# Patient Record
Sex: FEMALE | Race: WHITE | Employment: OTHER | URBAN - METROPOLITAN AREA
[De-identification: names, ages, dates, MRNs, and addresses within clinical notes are randomized per-mention and may not be internally consistent; named-entity substitution may affect disease eponyms.]

---

## 2017-01-12 RX ORDER — PREDNISONE 5 MG/1
TABLET ORAL
Qty: 135 TAB | Refills: 1 | Status: SHIPPED | OUTPATIENT
Start: 2017-01-12 | End: 2017-09-28 | Stop reason: DRUGHIGH

## 2017-01-25 LAB
ALP SERPL-CCNC: 140 IU/L (ref 39–117)
CRP SERPL-MCNC: 1.6 MG/L (ref 0–4.9)
ERYTHROCYTE [SEDIMENTATION RATE] IN BLOOD BY WESTERGREN METHOD: 4 MM/HR (ref 0–40)
PLEASE NOTE:, 188601: NORMAL

## 2017-02-20 ENCOUNTER — OFFICE VISIT (OUTPATIENT)
Dept: RHEUMATOLOGY | Age: 72
End: 2017-02-20

## 2017-02-20 VITALS
BODY MASS INDEX: 40.34 KG/M2 | RESPIRATION RATE: 16 BRPM | TEMPERATURE: 98.3 F | OXYGEN SATURATION: 97 % | HEIGHT: 62 IN | SYSTOLIC BLOOD PRESSURE: 110 MMHG | DIASTOLIC BLOOD PRESSURE: 70 MMHG | WEIGHT: 219.2 LBS | HEART RATE: 74 BPM

## 2017-02-20 DIAGNOSIS — K76.0 FATTY LIVER: ICD-10-CM

## 2017-02-20 DIAGNOSIS — Z79.52 LONG TERM (CURRENT) USE OF SYSTEMIC STEROIDS: ICD-10-CM

## 2017-02-20 DIAGNOSIS — M85.80 OSTEOPENIA: ICD-10-CM

## 2017-02-20 DIAGNOSIS — R74.8 ELEVATED ALKALINE PHOSPHATASE LEVEL: ICD-10-CM

## 2017-02-20 DIAGNOSIS — Z79.899 LONG-TERM USE OF HIGH-RISK MEDICATION: ICD-10-CM

## 2017-02-20 DIAGNOSIS — M06.4 INFLAMMATORY POLYARTHRITIS (HCC): Primary | ICD-10-CM

## 2017-02-20 DIAGNOSIS — M54.50 CHRONIC BILATERAL LOW BACK PAIN WITHOUT SCIATICA: ICD-10-CM

## 2017-02-20 DIAGNOSIS — G89.29 CHRONIC BILATERAL LOW BACK PAIN WITHOUT SCIATICA: ICD-10-CM

## 2017-02-20 NOTE — PATIENT INSTRUCTIONS
Gentle PT    Prednisone lower by 1 mg every 2-4 weeks if tolerated.  If significant pain persists with taper, please notify me (and return to lowest effective dose)

## 2017-02-20 NOTE — MR AVS SNAPSHOT
Visit Information Date & Time Provider Department Dept. Phone Encounter #  
 2/20/2017 10:30 AM Mariann Addison MD [facility] [id number] Follow-up Instructions Return in about 3 months (around 5/20/2017). Upcoming Health Maintenance Date Due DTaP/Tdap/Td series (1 - Tdap) 8/4/1968 BREAST CANCER SCRN MAMMOGRAM 8/4/1997 FOBT Q 1 YEAR AGE 50-75 8/4/1997 Pneumococcal 65+ Low/Medium Risk (1 of 2 - PCV13) 8/4/2012 MEDICARE YEARLY EXAM 8/4/2012 GLAUCOMA SCREENING Q2Y 6/14/2018 Allergies as of 2/20/2017  Review Complete On: 2/20/2017 By: Mariann Addison MD  
  
 Severity Noted Reaction Type Reactions Cottonseed  11/25/2015    Cough Lamisil [Terbinafine]  10/16/2012    Rash Malt Extract  11/25/2015    Cough, Other (comments) Asthma Current Immunizations  Reviewed on 2/20/2017 Name Date Influenza Vaccine 10/31/2016, 10/25/2015 Zoster Vaccine, Live 11/25/2013 Reviewed by Urban Orourke LPN on 9/03/7322 at 44:53 AM  
You Were Diagnosed With   
  
 Codes Comments Inflammatory polyarthritis ([facility] [address]    -  Primary ICD-10-CM: T59.8 ICD-9-CM: 714.9 Osteopenia     ICD-10-CM: M85.80 ICD-9-CM: 733.90 Long-term use of high-risk medication     ICD-10-CM: Z79.899 ICD-9-CM: V58.69 Long term (current) use of systemic steroids     ICD-10-CM: Z79.52 
ICD-9-CM: V58.65 Fatty liver     ICD-10-CM: K76.0 ICD-9-CM: 571.8 Chronic bilateral low back pain without sciatica     ICD-10-CM: M54.5, G89.29 ICD-9-CM: 724.2, 338.29 Elevated alkaline phosphatase level     ICD-10-CM: R74.8 ICD-9-CM: 790.5 Vitals BP Pulse Temp Resp Height(growth percentile) Weight(growth percentile) 110/70 (BP 1 Location: Right arm, BP Patient Position: Sitting) 74 98.3 °F (36.8 °C) (Oral) 16 5' 2\" (1.575 m) 219 lb 3.2 oz (99.4 kg) LMP SpO2 BMI OB Status Smoking Status 11/25/2002 97% 40.09 kg/m2 Postmenopausal Never Smoker Vitals History BMI and BSA Data Body Mass Index Body Surface Area 40.09 kg/m 2 2.09 m 2 [facility] [address] [phone] Your Updated Medication List  
  
   
This list is accurate as of: 2/20/17 11:12 AM.  Always use your most recent med list.  
  
  
  
  
 alendronate 70 mg tablet Commonly known as:  FOSAMAX 70 mg once weekly. Please review proper mode of taking  
  
 calcium-cholecalciferol (D3) tablet Commonly known as:  CALTRATE 600+D Take 1 Tab by mouth two (2) times a day. cholecalciferol 1,000 unit Cap Commonly known as:  VITAMIN D3 Take 1,000 Units by mouth two (2) times a day. DIOVAN -25 mg per tablet Generic drug:  valsartan-hydroCHLOROthiazide Take 1 Tab by mouth daily. DULERA 200-5 mcg/actuation HFA inhaler Generic drug:  mometasone-formoterol Take 2 Puffs by inhalation two (2) times a day. GLUCOSAMINE 1500 COMPLEX 500-400 mg capsule Generic drug:  glucosamine-chondroit-vit c-mn Take 2 Caps by mouth nightly. hydroxychloroquine 200 mg tablet Commonly known as:  PLAQUENIL  
TAKE 1 TABLET TWICE A DAY  
  
 ketoconazole 2 % topical cream  
Commonly known as:  NIZORAL Apply  to affected area as needed. magnesium oxide 400 mg tablet Commonly known as:  MAG-OX Take 400 mg by mouth daily. naproxen sodium 220 mg tablet Commonly known as:  NAPROSYN Take 440 mg by mouth daily as needed. NexIUM 40 mg capsule Generic drug:  esomeprazole Take  by mouth daily. olopatadine 0.6 % Spry Commonly known as:  PATANASE  
1 Squirt by Both Nostrils route two (2) times a day. * predniSONE 1 mg tablet Commonly known as:  Radha Zapata Along with 5 mg tab, take two 1 mg tabs for total 7 mg daily. Lower by 1 mg every 2-4 weeks if tolerated * predniSONE 5 mg tablet Commonly known as:  Jauna Lora Take one 5 mg tab once daily with two 1 mg tabs (7 mg daily total dose). Taper by 1 mg every 2 weeks if tolerated SINGULAIR 10 mg tablet Generic drug:  montelukast  
Take 10 mg by mouth daily. ZyrTEC 10 mg tablet Generic drug:  cetirizine Take  by mouth daily. * Notice: This list has 2 medication(s) that are the same as other medications prescribed for you. Read the directions carefully, and ask your doctor or other care provider to review them with you. We Performed the Following REFERRAL TO PHYSICAL THERAPY [BCY12 Custom] Comments:  
 Gentle topical analgesia over affected area(s). Gentle passive range of motion maneuvers as tolerated. Advance to active range of motion maneuvers as tolerated. Provide patient with a home education program.  
  
Follow-up Instructions Return in about 3 months (around 5/20/2017). To-Do List   
 03/27/2017 Lab:  C REACTIVE PROTEIN, QT   
  
 03/27/2017 Lab:  CBC WITH AUTOMATED DIFF   
  
 03/27/2017 Lab:  METABOLIC PANEL, COMPREHENSIVE   
  
 03/27/2017 Lab:  SED RATE (ESR) Referral Information Referral ID Referred By Referred To  
  
 [id number] Amanda Munroe Not Available Visits Status Start Date End Date 12 New Request 2/20/17 2/20/18 If your referral has a status of pending review or denied, additional information will be sent to support the outcome of this decision. Patient Instructions Gentle PT Prednisone lower by 1 mg every 2-4 weeks if tolerated. If significant pain persists with taper, please notify me (and return to lowest effective dose) Introducing [facility]! Dear Esperanza Chaudhry: Thank you for requesting a Firethorn account. Our records indicate that you already have an active Firethorn account. You can access your account anytime at [email or web address]/Therapeutic Systemst Did you know that you can access your hospital and ER discharge instructions at any time in 1DayMakeover? You can also review all of your test results from your hospital stay or ER visit. Additional Information If you have questions, please visit the Frequently Asked Questions section of the 1DayMakeover website at [email or web address] Remember, 1DayMakeover is NOT to be used for urgent needs. For medical emergencies, dial 911. Now available from your iPhone and Android! Please provide this summary of care documentation to your next provider. Your primary care clinician is listed as Natasha Wolf. If you have any questions after today's visit, please call [phone].

## 2017-02-20 NOTE — PROGRESS NOTES
HISTORY OF PRESENT ILLNESS  Alexus Pena is a 73 y.o. female. HPI Patient presents for follow up of arthritis. With current prednisone at 6 mg daily, she is doing reasonably well (lowered from 7 mg daily a couple of weeks ago). She has had some pain in the right shoulder, hands, low back, and the knees. Back pain is localized and increases with prolonged walking. Mild myalgias are noted (improved with prednisone). She has AM stiffness of 5 minutes. She has no joint swelling. Mild, sporadic, frontal headaches are noted. She is not taking naproxen. She has been taking Plaquenil 200 mg twice daily. She is taking calcium 600 mg twice daily and vitamin D 400 units twice daily. She takes an additional vitamin D 1000 units daily. She is taking alendronate 70 mg weekly as directed and is tolerating this. She has been trying to walk more regularly for exercise (15-20 minutes). Current Outpatient Prescriptions   Medication Sig Dispense Refill    predniSONE (DELTASONE) 5 mg tablet Take one 5 mg tab once daily with two 1 mg tabs (7 mg daily total dose). Taper by 1 mg every 2 weeks if tolerated 135 Tab 1    calcium-cholecalciferol, D3, (CALTRATE 600+D) tablet Take 1 Tab by mouth two (2) times a day.  predniSONE (DELTASONE) 1 mg tablet Along with 5 mg tab, take two 1 mg tabs for total 7 mg daily. Lower by 1 mg every 2-4 weeks if tolerated (Patient taking differently: 6 mg. Along with 5 mg tab, take two 1 mg tabs for total 7 mg daily. Lower by 1 mg every 2-4 weeks if tolerated) 120 Tab 5    alendronate (FOSAMAX) 70 mg tablet 70 mg once weekly. Please review proper mode of taking 4 Tab 11    hydroxychloroquine (PLAQUENIL) 200 mg tablet TAKE 1 TABLET TWICE A  Tab 1    magnesium oxide (MAG-OX) 400 mg tablet Take 400 mg by mouth daily.  Cholecalciferol, Vitamin D3, 1,000 unit cap Take 1,000 Units by mouth two (2) times a day.       DULERA 200-5 mcg/actuation HFA inhaler Take 2 Puffs by inhalation two (2) times a day.      olopatadine (PATANASE) 0.6 % spry 1 Squirt by Both Nostrils route two (2) times a day.  glucosamine-chondroit-vit c-mn (GLUCOSAMINE 1500 COMPLEX) 500-400 mg capsule Take 2 Caps by mouth nightly.  montelukast (SINGULAIR) 10 mg tablet Take 10 mg by mouth daily.  esomeprazole (NEXIUM) 40 mg capsule Take  by mouth daily.  valsartan-hydrochlorothiazide (DIOVAN HCT) 160-25 mg per tablet Take 1 Tab by mouth daily.  cetirizine (ZYRTEC) 10 mg tablet Take  by mouth daily.  naproxen sodium (NAPROSYN) 220 mg tablet Take 440 mg by mouth daily as needed.  ketoconazole (NIZORAL) 2 % topical cream Apply  to affected area as needed. Allergies   Allergen Reactions    Cottonseed Cough    Lamisil [Terbinafine] Rash    Malt Extract Cough and Other (comments)     Asthma         Review of Systems   Constitutional: Negative for fever. Eyes: Negative for blurred vision. Cardiovascular: Negative for leg swelling. Gastrointestinal: Negative for abdominal pain. Skin: Negative for rash. Physical Exam   Vitals reviewed. Constitutional: She is oriented to person, place, and time. She appears well-developed and well-nourished. No distress.    HENT:    Mouth/Throat: Oropharynx is clear and dry. No oropharyngeal exudate. No areas exposed jaw bone   Neck: Neck supple. FROM  Cardiovascular:   RR;   No edema  Pulmonary/Chest: Effort normal and breath sounds normal. No respiratory distress.    Abdominal: Soft. She exhibits no distension. There is no tenderness. No organomegaly   Musculoskeletal:   -flexion each knee to 90 degrees. Right shoulder painful FROM    -tenderness right shoulder each knee  -no lisa peripheral synovitis  -LS flexion 90 degrees. Tenderness L>R L4-S1 paraspinals  Lymphadenopathy:     She has no cervical adenopathy. Neurological: She is alert and oriented to person, place, and time. She exhibits normal muscle tone. Skin: Skin is warm and dry. -no acute rash; scattered bruising/ excoriations over right forearm  Psychiatric: She has a normal mood and affect. Judgment normal.    Lab Results   Component Value Date/Time    Sed rate (ESR) 4 01/24/2017 12:00 AM     CRP 0.16 mg/dL  Lab Results   Component Value Date/Time    Sodium 135 11/14/2016 11:19 AM    Potassium 3.6 11/14/2016 11:19 AM    Chloride 92 11/14/2016 11:19 AM    CO2 26 11/14/2016 11:19 AM    Glucose 85 11/14/2016 11:19 AM    BUN 14 11/14/2016 11:19 AM    Creatinine 0.81 11/14/2016 11:19 AM    BUN/Creatinine ratio 17 11/14/2016 11:19 AM    GFR est AA 86 11/14/2016 11:19 AM    GFR est non-AA 74 11/14/2016 11:19 AM    Calcium 9.8 11/14/2016 11:19 AM    Bilirubin, total 0.4 02/23/2016 02:30 AM    AST (SGOT) 18 02/23/2016 02:30 AM    Alk. phosphatase 140 01/24/2017 12:00 AM    Protein, total 6.3 02/23/2016 02:30 AM    Albumin 4.0 02/23/2016 02:30 AM    A-G Ratio 1.7 02/23/2016 02:30 AM    ALT (SGPT) 22 02/23/2016 02:30 AM       ASSESSMENT and PLAN    ICD-10-CM ICD-9-CM    1. Inflammatory polyarthritis ([facility] [address]): RF, CCP, NEMESIO negative. RNP likely false positive. 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 OA of knees as well. Hand radiographs with significant JSN at each 3rd MCP. She has been taking Plaquenil 200 mg BID. More significant proximal myalgias recently raised consideration of PMR (with initial ESR/CRP normal), though still most likely seronegative RA. With prednisone 6 mg daily, symptoms remain controlled. M06.4 714.9 SED RATE (ESR)  Plaquenil 200 mg BID  Prednisone 6 mg daily with taper by 1 mg every 2-4 weeks if tolerated  -Consider methotrexate (see below) if unable to significantly taper prednisone  -comfortable, low impact exercise encouraged      C REACTIVE PROTEIN, QT   2. Osteopenia: T-score -2.0 femoral neck 2014. No clinical fractures personally or first degree relative with hip fracture.  DXA recently with low femoral neck (not currently on chart yet).  She is taking alendronate 70 mg weekly. M85.80 733.90 -calcium 1200 mg daily total  -vitamin D3 2042-1210 units daily  -alendronate 70 mg weekly   3. Long-term use of high-risk medication F37.956 B85.25 METABOLIC PANEL, COMPREHENSIVE      CBC WITH AUTOMATED DIFF  Plaquenil eye monitoring   4. Long term (current) use of systemic steroids Z79.52 V58.65 See above   5. Fatty liver: noted on recent chest CT K76.0 571.8 -weight loss encouraged  -if methotrexate utilized, cautious monitoring will be needed   6. Chronic bilateral low back pain without sciatica M54.5 724.2 REFERRAL TO PHYSICAL THERAPY    G89.29 338.29    7. Elevated alkaline phosphatase level: mild and repeatedly elevated on available records ([phone]). Most recent D, PTH, GGT normal. Unclear etiology. Recent check 140 (on prednisone)- perhaps related to inflammatory arthritis.  R19.7 816.7 METABOLIC PANEL, COMPREHENSIVE

## 2017-05-05 LAB
ALBUMIN SERPL-MCNC: 4.1 G/DL (ref 3.6–4.8)
ALBUMIN/GLOB SERPL: 1.9 {RATIO} (ref 1.2–2.2)
ALP SERPL-CCNC: 139 IU/L (ref 39–117)
ALT SERPL-CCNC: 31 IU/L (ref 0–32)
AST SERPL-CCNC: 20 IU/L (ref 0–40)
BASOPHILS # BLD AUTO: 0 X10E3/UL (ref 0–0.2)
BASOPHILS NFR BLD AUTO: 0 %
BILIRUB SERPL-MCNC: 0.3 MG/DL (ref 0–1.2)
BUN SERPL-MCNC: 14 MG/DL (ref 8–27)
BUN/CREAT SERPL: 15 (ref 12–28)
CALCIUM SERPL-MCNC: 9.3 MG/DL (ref 8.7–10.3)
CHLORIDE SERPL-SCNC: 92 MMOL/L (ref 96–106)
CO2 SERPL-SCNC: 25 MMOL/L (ref 18–29)
CREAT SERPL-MCNC: 0.94 MG/DL (ref 0.57–1)
CRP SERPL-MCNC: 4 MG/L (ref 0–4.9)
EOSINOPHIL # BLD AUTO: 0.1 X10E3/UL (ref 0–0.4)
EOSINOPHIL NFR BLD AUTO: 2 %
ERYTHROCYTE [DISTWIDTH] IN BLOOD BY AUTOMATED COUNT: 14.2 % (ref 12.3–15.4)
ERYTHROCYTE [SEDIMENTATION RATE] IN BLOOD BY WESTERGREN METHOD: 11 MM/HR (ref 0–40)
GLOBULIN SER CALC-MCNC: 2.2 G/DL (ref 1.5–4.5)
GLUCOSE SERPL-MCNC: 153 MG/DL (ref 65–99)
HCT VFR BLD AUTO: 36.7 % (ref 34–46.6)
HGB BLD-MCNC: 12 G/DL (ref 11.1–15.9)
IMM GRANULOCYTES # BLD: 0 X10E3/UL (ref 0–0.1)
IMM GRANULOCYTES NFR BLD: 0 %
LYMPHOCYTES # BLD AUTO: 0.7 X10E3/UL (ref 0.7–3.1)
LYMPHOCYTES NFR BLD AUTO: 14 %
MCH RBC QN AUTO: 27.9 PG (ref 26.6–33)
MCHC RBC AUTO-ENTMCNC: 32.7 G/DL (ref 31.5–35.7)
MCV RBC AUTO: 85 FL (ref 79–97)
MONOCYTES # BLD AUTO: 0.4 X10E3/UL (ref 0.1–0.9)
MONOCYTES NFR BLD AUTO: 8 %
NEUTROPHILS # BLD AUTO: 4.1 X10E3/UL (ref 1.4–7)
NEUTROPHILS NFR BLD AUTO: 76 %
PLATELET # BLD AUTO: 289 X10E3/UL (ref 150–379)
POTASSIUM SERPL-SCNC: 4.1 MMOL/L (ref 3.5–5.2)
PROT SERPL-MCNC: 6.3 G/DL (ref 6–8.5)
RBC # BLD AUTO: 4.3 X10E6/UL (ref 3.77–5.28)
SODIUM SERPL-SCNC: 134 MMOL/L (ref 134–144)
WBC # BLD AUTO: 5.3 X10E3/UL (ref 3.4–10.8)

## 2017-05-05 NOTE — PROGRESS NOTES
Please forward to PCP. Glucose 153.  Elevated liver/bone test is only slightly elevated (better than at baseline)

## 2017-05-09 NOTE — PROGRESS NOTES
Called and spoke with the patient. Informed them of the results/orders. Understanding was verbalized. Labs forwarded to to pcp.

## 2017-05-18 ENCOUNTER — OFFICE VISIT (OUTPATIENT)
Dept: RHEUMATOLOGY | Age: 72
End: 2017-05-18

## 2017-05-18 VITALS
BODY MASS INDEX: 39.75 KG/M2 | HEIGHT: 62 IN | RESPIRATION RATE: 16 BRPM | OXYGEN SATURATION: 100 % | TEMPERATURE: 97.1 F | HEART RATE: 81 BPM | SYSTOLIC BLOOD PRESSURE: 126 MMHG | WEIGHT: 216 LBS | DIASTOLIC BLOOD PRESSURE: 69 MMHG

## 2017-05-18 DIAGNOSIS — K76.0 FATTY LIVER: ICD-10-CM

## 2017-05-18 DIAGNOSIS — M54.50 CHRONIC LEFT-SIDED LOW BACK PAIN WITHOUT SCIATICA: ICD-10-CM

## 2017-05-18 DIAGNOSIS — M06.4 INFLAMMATORY POLYARTHRITIS (HCC): Primary | ICD-10-CM

## 2017-05-18 DIAGNOSIS — M94.9 DISORDER OF BONE AND CARTILAGE, UNSPECIFIED: ICD-10-CM

## 2017-05-18 DIAGNOSIS — G89.29 CHRONIC LEFT-SIDED LOW BACK PAIN WITHOUT SCIATICA: ICD-10-CM

## 2017-05-18 DIAGNOSIS — M89.9 DISORDER OF BONE AND CARTILAGE, UNSPECIFIED: ICD-10-CM

## 2017-05-18 DIAGNOSIS — Z79.899 LONG-TERM USE OF HIGH-RISK MEDICATION: ICD-10-CM

## 2017-05-18 RX ORDER — AMOXICILLIN 875 MG/1
TABLET, FILM COATED ORAL
COMMUNITY
Start: 2017-05-17 | End: 2019-01-21

## 2017-05-18 RX ORDER — ALBUTEROL SULFATE 0.83 MG/ML
SOLUTION RESPIRATORY (INHALATION)
COMMUNITY
Start: 2017-05-17 | End: 2019-04-23

## 2017-05-18 RX ORDER — IPRATROPIUM BROMIDE 0.5 MG/2.5ML
SOLUTION RESPIRATORY (INHALATION)
COMMUNITY
Start: 2017-05-17 | End: 2019-01-21

## 2017-05-18 NOTE — PATIENT INSTRUCTIONS
In a few weeks when bronchitis is better, then lower the prednisone by taking 3 mg one day and 2 mg the next, alternating every other day like this for several weeks. Then lower to 2 mg daily for several weeks. If unable to continue to lower the prednisone, then we'll start methotrexate. Call/email us with an update.

## 2017-05-18 NOTE — MR AVS SNAPSHOT
Visit Information Date & Time Provider Department Dept. Phone Encounter #  
 5/18/2017 10:30 AM Geraldo Guzman MD [facility] [id number] Follow-up Instructions Return in about 3 months (around 8/18/2017). Upcoming Health Maintenance Date Due DTaP/Tdap/Td series (1 - Tdap) 8/4/1968 BREAST CANCER SCRN MAMMOGRAM 8/4/1997 FOBT Q 1 YEAR AGE 50-75 8/4/1997 Pneumococcal 65+ Low/Medium Risk (1 of 2 - PCV13) 8/4/2012 MEDICARE YEARLY EXAM 8/4/2012 INFLUENZA AGE 9 TO ADULT 8/1/2017 GLAUCOMA SCREENING Q2Y 6/14/2018 Allergies as of 5/18/2017  Review Complete On: 5/18/2017 By: Raisa Estrada PA-C Severity Noted Reaction Type Reactions Cottonseed  11/25/2015    Cough Lamisil [Terbinafine]  10/16/2012    Rash Malt Extract  11/25/2015    Cough, Other (comments) Asthma Current Immunizations  Reviewed on 2/20/2017 Name Date Influenza Vaccine 10/31/2016, 10/25/2015 Zoster Vaccine, Live 11/25/2013 Not reviewed this visit You Were Diagnosed With   
  
 Codes Comments [facility]    -  Primary ICD-10-CM: N41.5 ICD-9-CM: 714.9 Long-term use of high-risk medication     ICD-10-CM: Z79.899 ICD-9-CM: V58.69 Fatty liver     ICD-10-CM: K76.0 ICD-9-CM: 571.8 Chronic left-sided low back pain without sciatica     ICD-10-CM: M54.5, G89.29 ICD-9-CM: 724.2, 338.29 Osteopenia of left thigh     ICD-10-CM: F64.890 ICD-9-CM: 733.90 Vitals BP Pulse Temp Resp Height(growth percentile) Weight(growth percentile) 126/69 (BP 1 Location: Right arm, BP Patient Position: Sitting) 81 97.1 °F (36.2 °C) (Oral) 16 5' 2\" (1.575 m) 216 lb (98 kg) LMP SpO2 BMI OB Status Smoking Status 11/25/2002 100% 39.51 kg/m2 Postmenopausal Never Smoker BMI and BSA Data Body Mass Index Body Surface Area  
 39.51 kg/m 2 2.07 m 2 [address] [phone] Your Updated Medication List  
  
   
This list is accurate as of: 5/18/17 11:15 AM.  Always use your most recent med list.  
  
  
  
  
 albuterol 2.5 mg /3 mL (0.083 %) nebulizer solution Commonly known as:  PROVENTIL VENTOLIN  
  
 alendronate 70 mg tablet Commonly known as:  FOSAMAX 70 mg once weekly. Please review proper mode of taking  
  
 amoxicillin 875 mg tablet Commonly known as:  AMOXIL  
  
 calcium-cholecalciferol (D3) tablet Commonly known as:  CALTRATE 600+D Take 1 Tab by mouth two (2) times a day. cholecalciferol 1,000 unit Cap Commonly known as:  VITAMIN D3 Take 1,000 Units by mouth two (2) times a day. DIOVAN -25 mg per tablet Generic drug:  valsartan-hydroCHLOROthiazide Take 1 Tab by mouth daily. DULERA 200-5 mcg/actuation HFA inhaler Generic drug:  mometasone-formoterol Take 2 Puffs by inhalation two (2) times a day. GLUCOSAMINE 1500 COMPLEX 500-400 mg capsule Generic drug:  glucosamine-chondroit-vit c-mn Take 2 Caps by mouth nightly. hydroxychloroquine 200 mg tablet Commonly known as:  PLAQUENIL  
TAKE 1 TABLET TWICE A DAY  
  
 ipratropium 0.02 % nebulizer solution Commonly known as:  ATROVENT  
  
 ketoconazole 2 % topical cream  
Commonly known as:  NIZORAL Apply  to affected area as needed. magnesium oxide 400 mg tablet Commonly known as:  MAG-OX Take 400 mg by mouth daily. naproxen sodium 220 mg tablet Commonly known as:  NAPROSYN Take 440 mg by mouth daily as needed. NexIUM 40 mg capsule Generic drug:  esomeprazole Take  by mouth daily. olopatadine 0.6 % Spry Commonly known as:  PATANASE  
1 Squirt by Both Nostrils route two (2) times a day. * predniSONE 1 mg tablet Commonly known as:  Andrew Etienne Along with 5 mg tab, take two 1 mg tabs for total 7 mg daily.  Lower by 1 mg every 2-4 weeks if tolerated * predniSONE 5 mg tablet Commonly known as:  Tatyana Denis Take one 5 mg tab once daily with two 1 mg tabs (7 mg daily total dose). Taper by 1 mg every 2 weeks if tolerated SINGULAIR 10 mg tablet Generic drug:  montelukast  
Take 10 mg by mouth daily. ZyrTEC 10 mg tablet Generic drug:  cetirizine Take  by mouth daily. * Notice: This list has 2 medication(s) that are the same as other medications prescribed for you. Read the directions carefully, and ask your doctor or other care provider to review them with you. We Performed the Following REFERRAL TO PHYSICAL THERAPY [XCF50 Custom] Comments:  
 Once a week for 4 weeks to include HEP instruction. Follow-up Instructions Return in about 3 months (around 8/18/2017). Referral Information Referral ID Referred By Referred To  
  
 [id number] [facility] Not Available Visits Status Start Date End Date 1 New Request 5/18/17 5/18/18 If your referral has a status of pending review or denied, additional information will be sent to support the outcome of this decision. Patient Instructions In a few weeks when bronchitis is better, then lower the prednisone by taking 3 mg one day and 2 mg the next, alternating every other day like this for several weeks. Then lower to 2 mg daily for several weeks. If unable to continue to lower the prednisone, then we'll start methotrexate. Call/email us with an update. Introducing [facility]! Dear Edmund Hernnádez: Thank you for requesting a Baofeng account. Our records indicate that you already have an active Baofeng account. You can access your account anytime at [email or web address] Did you know that you can access your hospital and ER discharge instructions at any time in Baofeng? You can also review all of your test results from your hospital stay or ER visit. Additional Information If you have questions, please visit the Frequently Asked Questions section of the Huaathart website at [email or web address] Remember, [employer] is NOT to be used for urgent needs. For medical emergencies, dial 911. Now available from your iPhone and Android! Please provide this summary of care documentation to your next provider. Your primary care clinician is listed as Shane Barrios. If you have any questions after today's visit, please call [phone].

## 2017-05-18 NOTE — PROGRESS NOTES
HISTORY OF PRESENT ILLNESS  Kelsey Layton is a 73 y.o. female. HPI  She presents for follow up of inflammatory arthritis, on Plaquenil 200 mg bid, and prednisone 3 mg daily (lowered from 6 mg since last visit in Feb). \"I have better days than others. I've had very good success with the PT for my back. \" She started and completed PT after last visit for low back and hip pain, and she states it has been great benefit, she is still HEP daily. She reports \"steroid\" 40 mg IM yesterday by her PCP for her bronchitis (allergies this spring and cough x 2 days, no fevers), \"I feel great\". She has a bunion right foot, causes intermittent pain. Her hands flared when prednisone dose was 2.5 mg and improved when going back up to 3 mg last week. Some swelling in her hands persists on right, has improved on left. Myalgias persist (see below). Morning joint stiffness x 1-15 min. Not taking in NSAIDs. She is up to date on plaquenil eye exam. She is taking calcium 600 mg twice daily and vitamin D 400 units twice daily. She takes an additional vitamin D 1000 units daily. She is taking alendronate 70 mg weekly as directed and is tolerating this. 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 She reports doing well on her grandson's 3rd grade field trip to [place] in April. Chronically she notes pain only to touch of her \"large muscles\", to include upper back, upper arms, and thighs. She notes a numbness left lateral thigh with standing that resolves when she is sitting, this is chronic and unchanged by PT or steroids. Her large muscles pain did not change with the steroid injection yesterday. Review of Systems   Constitutional: Negative for fever and weight loss. HENT: Negative for sore throat. Eyes: Negative for blurred vision. Respiratory: Positive for cough and wheezing. Cardiovascular: Negative for leg swelling.    Gastrointestinal: Negative for abdominal pain, blood in stool, melena, nausea and vomiting. Musculoskeletal: Negative for falls. Skin: Negative for rash. Endo/Heme/Allergies: Negative for polydipsia. Does not bruise/bleed easily. Allergies   Allergen Reactions    Cottonseed Cough    Lamisil [Terbinafine] Rash    Malt Extract Cough and Other (comments)     Asthma         Current Outpatient Prescriptions   Medication Sig Dispense Refill    albuterol (PROVENTIL VENTOLIN) 2.5 mg /3 mL (0.083 %) nebulizer solution       amoxicillin (AMOXIL) 875 mg tablet       ipratropium (ATROVENT) 0.02 % nebulizer solution       calcium-cholecalciferol, D3, (CALTRATE 600+D) tablet Take 1 Tab by mouth two (2) times a day.  alendronate (FOSAMAX) 70 mg tablet 70 mg once weekly. Please review proper mode of taking 4 Tab 11    hydroxychloroquine (PLAQUENIL) 200 mg tablet TAKE 1 TABLET TWICE A  Tab 1    magnesium oxide (MAG-OX) 400 mg tablet Take 400 mg by mouth daily.  Cholecalciferol, Vitamin D3, 1,000 unit cap Take 1,000 Units by mouth two (2) times a day.  DULERA 200-5 mcg/actuation HFA inhaler Take 2 Puffs by inhalation two (2) times a day.  olopatadine (PATANASE) 0.6 % spry 1 Squirt by Both Nostrils route two (2) times a day.  glucosamine-chondroit-vit c-mn (GLUCOSAMINE 1500 COMPLEX) 500-400 mg capsule Take 2 Caps by mouth nightly.  montelukast (SINGULAIR) 10 mg tablet Take 10 mg by mouth daily.  esomeprazole (NEXIUM) 40 mg capsule Take  by mouth daily.  valsartan-hydrochlorothiazide (DIOVAN HCT) 160-25 mg per tablet Take 1 Tab by mouth daily.  cetirizine (ZYRTEC) 10 mg tablet Take  by mouth daily.  predniSONE (DELTASONE) 5 mg tablet Take one 5 mg tab once daily with two 1 mg tabs (7 mg daily total dose). Taper by 1 mg every 2 weeks if tolerated 135 Tab 1    predniSONE (DELTASONE) 1 mg tablet Along with 5 mg tab, take two 1 mg tabs for total 7 mg daily.  Lower by 1 mg every 2-4 weeks if tolerated (Patient taking differently: 3 mg. Along with 5 mg tab, take two 1 mg tabs for total 7 mg daily. Lower by 1 mg every 2-4 weeks if tolerated) 120 Tab 5    naproxen sodium (NAPROSYN) 220 mg tablet Take 440 mg by mouth daily as needed.  ketoconazole (NIZORAL) 2 % topical cream Apply  to affected area as needed. Past medical, surgical, and family hx reviewed. Vitals:    05/18/17 1025   BP: 126/69   Pulse: 81   Resp: 16   Temp: 97.1 °F (36.2 °C)   TempSrc: Oral   SpO2: 100%   Weight: 216 lb (98 kg)   Height: 5' 2\" (1.575 m)   PainSc:   0 - No pain   LMP: 11/25/2002       Physical Exam   Constitutional: She is oriented to person, place, and time. She appears well-developed and well-nourished. HENT:   Mouth is dry   Eyes: Conjunctivae are normal. Pupils are equal, round, and reactive to light. Neck: Neck supple. Cardiovascular: Normal rate, regular rhythm and normal heart sounds. Pulmonary/Chest: Effort normal and breath sounds normal. She has no wheezes. Abdominal: Soft. Bowel sounds are normal. There is no tenderness. Musculoskeletal:   -synovitis: both hands MCP 3   -trigger points noted  over low back and proximal forearms  -trunk flexion 90 degrees without pain     Lymphadenopathy:     She has no cervical adenopathy. Neurological: She is alert and oriented to person, place, and time. Strength BUE and BLE 5/5  SLR negative bilaterally in seated position   Skin: Skin is warm and dry. No rash noted. Psychiatric: She has a normal mood and affect. Thought content normal.   Vitals reviewed.     Lab Results   Component Value Date/Time    Sed rate (ESR) 11 05/04/2017 11:00 AM     Lab Results   Component Value Date/Time    WBC 5.3 05/04/2017 11:00 AM    HGB 12.0 05/04/2017 11:00 AM    HCT 36.7 05/04/2017 11:00 AM    PLATELET 541 53/98/9538 11:00 AM    MCV 85 05/04/2017 11:00 AM     Lab Results   Component Value Date/Time    Sodium 134 05/04/2017 11:00 AM    Potassium 4.1 05/04/2017 11:00 AM Chloride 92 05/04/2017 11:00 AM    CO2 25 05/04/2017 11:00 AM    Glucose 153 05/04/2017 11:00 AM    BUN 14 05/04/2017 11:00 AM    Creatinine 0.94 05/04/2017 11:00 AM    BUN/Creatinine ratio 15 05/04/2017 11:00 AM    GFR est AA 72 05/04/2017 11:00 AM    GFR est non-AA 62 05/04/2017 11:00 AM    Calcium 9.3 05/04/2017 11:00 AM    Bilirubin, total 0.3 05/04/2017 11:00 AM    AST (SGOT) 20 05/04/2017 11:00 AM    Alk. phosphatase 139 05/04/2017 11:00 AM    Protein, total 6.3 05/04/2017 11:00 AM    Albumin 4.1 05/04/2017 11:00 AM    A-G Ratio 1.9 05/04/2017 11:00 AM    ALT (SGPT) 31 05/04/2017 11:00 AM       ASSESSMENT and PLAN    ICD-10-CM ICD-9-CM    1. Inflammatory polyarthritis (HCC) - RF, CCP, NEMESIO negative. RNP likely false positive. 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 OA of knees as well. Hand radiographs with significant JSN at each 3rd MCP. She has been taking Plaquenil 200 mg BID. More significant proximal myalgias recently raised consideration of PMR (with initial ESR/CRP normal), though still most likely seronegative RA. With prednisone 3 mg daily, symptoms remain controlled, with flare of hand sxs at 2.5 mg daily. Today symptoms improved due to steroid IM injection yesterday for bronchitis. M06.4 714.9 Plaquenil 200 mg BID    -continue prednisone 3 mg daily for now, when bronchitis resolved lower to 3 mg alternating every other day with 2 mg x several weeks, then lower to 2 mg daily    -Start methotrexate if unable to significantly taper prednisone    -comfortable, low impact exercise encouraged   2. Long-term use of high-risk medication Z79.899 V58.69 Plaquenil eye exam     3. Fatty liver -  noted on recent chest CT   K76.0 571.8 Monitor carefully if methotrexate is started   4.  Chronic left-sided low back pain without sciatica - benefit with PT, she is performing HEP, she would like a gradual reduction in PT to transfer to home program M54.5 724.2 REFERRAL TO PHYSICAL THERAPY - once weekly for few weeks to review and instruct on HEP    G89.29 338.29    5. Osteopenia: T-score -2.0 femoral neck 2014. No clinical fractures personally or first degree relative with hip fracture.  DXA recently with low femoral neck (not currently on chart yet). She is taking alendronate 70 mg weekly. M89.9 733.90 -calcium 1200 mg daily total  -vitamin D3 7526-7701 units daily  -alendronate 70 mg weekly    M94.9         Follow-up Disposition:  Return in about 3 months (around 8/18/2017). I have reviewed and discussed all findings with Dr. Sona Lawson, who also examined the patient, and he agrees with the assessment and plan. ADDENDUM: I have seen and examined the patient. I have discussed the relevant findings with Ms. Paul and concur with the assessment and plan. Currently prednisone 3 mg daily and Plaquenil 200 mg BID. If feasible, taper prednisone as directed. If unable to taper, consider methotrexate Goal to lower Plaquenil to 300 mg daily dose total eventually. Continue alendronate. Tapering course of PT for back.   Rena Elena MD

## 2017-06-12 RX ORDER — HYDROXYCHLOROQUINE SULFATE 200 MG/1
TABLET, FILM COATED ORAL
Qty: 180 TAB | Refills: 1 | Status: SHIPPED | OUTPATIENT
Start: 2017-06-12 | End: 2019-04-23

## 2017-06-13 ENCOUNTER — PATIENT MESSAGE (OUTPATIENT)
Dept: RHEUMATOLOGY | Age: 72
End: 2017-06-13

## 2017-06-14 NOTE — TELEPHONE ENCOUNTER
Called and spoke with Ms. Francis and confirmed that she would like to received 90 days supply on her next refill of Plaquenil which should be from [facility].

## 2017-06-29 ENCOUNTER — TELEPHONE (OUTPATIENT)
Dept: RHEUMATOLOGY | Age: 72
End: 2017-06-29

## 2017-06-29 NOTE — TELEPHONE ENCOUNTER
Called and spoke with pt and informed her of Loreta Sood' instructions on per prednisone usage: \"She can go back up to prednisone 3 mg daily for 2 weeks. Then lower by taking prednisone 2 mg just 1 day per week such as on Sun, with prednisone 3 mg the other days for 2 weeks. Then take prednisone 2 mg for 2 days per week on Sun and Wed, with 3 mg the other days. Do this for 3-4 weeks and let us know how it's going. \"    Pt verbalized understanding of these instructions.

## 2017-06-29 NOTE — TELEPHONE ENCOUNTER
Left message for patient to return phone call. Reason for call:  Per Cristiana Vail, regarding her medication, \"She can go back up to prednisone 3 mg daily for 2 weeks. Then lower by taking prednisone 2 mg just 1 day per week such as on Sun, with prednisone 3 mg the other days for 2 weeks. Then take prednisone 2 mg for 2 days per week on Sun and Wed, with 3 mg the other days. Do this for 3-4 weeks and let us know how it's going. \"

## 2017-06-29 NOTE — TELEPHONE ENCOUNTER
----- Message from Miguel Wilkins PA-C sent at 6/29/2017  3:50 PM EDT -----  She can go back up to prednisone 3 mg daily for 2 weeks. Then lower by taking prednisone 2 mg just 1 day per week such as on Sun, with prednisone 3 mg the other days for 2 weeks. Then take prednisone 2 mg for 2 days per week on Sun and Wed, with 3 mg the other days. Do this for 3-4 weeks and let us know how it's going.     ----- Message -----     From: Sonali River LPN     Sent: 1/63/8574   8:27 AM       To: Miguel Wilkins PA-C    Update on Rx:  it seems alternating 2 or 3 prednisone pills/day may not be working as well as we hoped.  Right hand, left hip, lower back on left, right shoulder are hurting. ..maybe a constant 3 on the pain scale.  Sitting for an hour (for Bible study today) brought stiffness in hips and knees.  Am also having continued discomfort in the large muscle groups to touch--thighs, biceps, triceps, both sides of back.  Just aggravating!      Thanks,   [employer]

## 2017-09-28 ENCOUNTER — OFFICE VISIT (OUTPATIENT)
Dept: RHEUMATOLOGY | Age: 72
End: 2017-09-28

## 2017-09-28 VITALS
DIASTOLIC BLOOD PRESSURE: 75 MMHG | HEART RATE: 76 BPM | TEMPERATURE: 98.3 F | WEIGHT: 210 LBS | RESPIRATION RATE: 20 BRPM | BODY MASS INDEX: 38.64 KG/M2 | SYSTOLIC BLOOD PRESSURE: 117 MMHG | HEIGHT: 62 IN | OXYGEN SATURATION: 99 %

## 2017-09-28 DIAGNOSIS — Z79.899 LONG-TERM USE OF PLAQUENIL: ICD-10-CM

## 2017-09-28 DIAGNOSIS — M06.4 INFLAMMATORY POLYARTHRITIS (HCC): Primary | ICD-10-CM

## 2017-09-28 DIAGNOSIS — K76.0 HEPATIC STEATOSIS: ICD-10-CM

## 2017-09-28 DIAGNOSIS — M85.852 OSTEOPENIA OF LEFT THIGH: ICD-10-CM

## 2017-09-28 NOTE — PROGRESS NOTES
HISTORY OF PRESENT ILLNESS  Rolanda Tomlin is a 81 y.o. female. HPI Patient presents for follow up of arthritis. She notes increased pain in \"new joints\" in the hands recently. She has some difficulty gripping objects. Chronic low back pain is noted. She has AM stiffness of  \"3-4 minutes. \" She has swelling of the fingers. She is not taking naproxen. She has been taking Plaquenil 200 mg twice daily. She is taking prednisone 3 mg daily (2 mg daily two days of the week). She has tolerated this dose. She is taking calcium 600 mg twice daily and vitamin D 400 units twice daily. She takes an additional vitamin D 1000 units daily. She is taking alendronate 70 mg weekly. 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 She is working on losing weight. Current Outpatient Prescriptions   Medication Sig Dispense Refill    hydroxychloroquine (PLAQUENIL) 200 mg tablet TAKE 1 TABLET TWICE A  Tab 1    albuterol (PROVENTIL VENTOLIN) 2.5 mg /3 mL (0.083 %) nebulizer solution       ipratropium (ATROVENT) 0.02 % nebulizer solution       calcium-cholecalciferol, D3, (CALTRATE 600+D) tablet Take 1 Tab by mouth two (2) times a day.  predniSONE (DELTASONE) 1 mg tablet Along with 5 mg tab, take two 1 mg tabs for total 7 mg daily. Lower by 1 mg every 2-4 weeks if tolerated (Patient taking differently: 3 mg. Along with 5 mg tab, take two 1 mg tabs for total 7 mg daily. Lower by 1 mg every 2-4 weeks if tolerated) 120 Tab 5    alendronate (FOSAMAX) 70 mg tablet 70 mg once weekly. Please review proper mode of taking 4 Tab 11    magnesium oxide (MAG-OX) 400 mg tablet Take 400 mg by mouth daily.  naproxen sodium (NAPROSYN) 220 mg tablet Take 440 mg by mouth daily as needed.  Cholecalciferol, Vitamin D3, 1,000 unit cap Take 1,000 Units by mouth two (2) times a day.  ketoconazole (NIZORAL) 2 % topical cream Apply  to affected area as needed.       DULERA 200-5 mcg/actuation HFA inhaler Take 2 Puffs by inhalation two (2) times a day.  olopatadine (PATANASE) 0.6 % spry 1 Squirt by Both Nostrils route two (2) times a day.  glucosamine-chondroit-vit c-mn (GLUCOSAMINE 1500 COMPLEX) 500-400 mg capsule Take 2 Caps by mouth nightly.  montelukast (SINGULAIR) 10 mg tablet Take 10 mg by mouth daily.  esomeprazole (NEXIUM) 40 mg capsule Take  by mouth daily.  valsartan-hydrochlorothiazide (DIOVAN HCT) 160-25 mg per tablet Take 1 Tab by mouth daily.  cetirizine (ZYRTEC) 10 mg tablet Take  by mouth daily.  amoxicillin (AMOXIL) 875 mg tablet        Allergies   Allergen Reactions    Cottonseed Cough    Lamisil [Terbinafine] Rash    Malt Extract Cough and Other (comments)     Asthma       Review of Systems   Constitutional: Negative for fever. Eyes: Negative for blurred vision. Gastrointestinal: Negative for abdominal pain. Musculoskeletal: Positive for falls. Skin: Negative for rash. Physical Exam   Vitals reviewed. Constitutional: She is oriented to person, place, and time. She appears well-developed and well-nourished. No distress.    HENT:    Mouth/Throat: Oropharynx is clear and dry. No oropharyngeal exudate. No areas exposed jaw bone   Neck: Neck supple. Mildly reduced lateral rotation in each direction with spasm/ tenderness lower cervical paraspinals  Cardiovascular:   RR;   trace edema  Pulmonary/Chest: Effort normal and breath sounds normal. No respiratory distress.    Abdominal: Soft. She exhibits no distension. There is no tenderness. No organomegaly   Musculoskeletal:   -flexion each knee to 90 degrees. -Tenderness lower lumbar paraspinals  -Heberden's and Armando's nodes each hand; nodular thickening right 3rd MCP  -tenderness right first and third MCP and 2nd PIP; tenderness left first MCP and 2nd PIP; no lisa synovitis  Lymphadenopathy:     She has no cervical adenopathy.    Neurological: She is alert and oriented to person, place, and time. She exhibits normal muscle tone. Skin: Skin is warm and dry. -stasis dermatitis  -resolving bruise right lateral lower back  Psychiatric: She has a normal mood and affect. Judgment normal.    Lab Results   Component Value Date/Time    WBC 5.3 05/04/2017 11:00 AM    HGB 12.0 05/04/2017 11:00 AM    HCT 36.7 05/04/2017 11:00 AM    PLATELET 780 40/63/0904 11:00 AM    MCV 85 05/04/2017 11:00 AM     Lab Results   Component Value Date/Time    Sed rate (ESR) 11 05/04/2017 11:00 AM     Lab Results   Component Value Date/Time    Sodium 134 05/04/2017 11:00 AM    Potassium 4.1 05/04/2017 11:00 AM    Chloride 92 05/04/2017 11:00 AM    CO2 25 05/04/2017 11:00 AM    Glucose 153 05/04/2017 11:00 AM    BUN 14 05/04/2017 11:00 AM    Creatinine 0.94 05/04/2017 11:00 AM    BUN/Creatinine ratio 15 05/04/2017 11:00 AM    GFR est AA 72 05/04/2017 11:00 AM    GFR est non-AA 62 05/04/2017 11:00 AM    Calcium 9.3 05/04/2017 11:00 AM    Bilirubin, total 0.3 05/04/2017 11:00 AM    AST (SGOT) 20 05/04/2017 11:00 AM    Alk. phosphatase 139 05/04/2017 11:00 AM    Protein, total 6.3 05/04/2017 11:00 AM    Albumin 4.1 05/04/2017 11:00 AM    A-G Ratio 1.9 05/04/2017 11:00 AM    ALT (SGPT) 31 05/04/2017 11:00 AM     Lab Results   Component Value Date/Time    Hep B surface Ag screen Negative 11/25/2015 03:55 PM    Hep B Core Ab, total Negative 11/25/2015 03:55 PM    HEP C VIRUS AB <0.1 11/25/2015 03:55 PM     MHAQ 0.250    ASSESSMENT and PLAN    ICD-10-CM ICD-9-CM    1. Inflammatory polyarthritis ([facility]): RF, CCP, NEMESIO negative. RNP likely false positive. 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 OA of knees as well. Hand radiographs with significant JSN at each 3rd MCP. She has been taking Plaquenil 200 mg BID. More significant proximal myalgias recently raised consideration of PMR (with initial ESR/CRP normal), though still most likely seronegative RA.  Prednisone has helped (particularly proximal myalgias noted earlier in the year). She is taking 2-3 mg daily. Symptoms overall controlled with current symptoms more likely related to hand OA. M06.4 714.9 -taper prednisone in 1 mg alternating day's dosing increments every 2-4 weeks if tolerated  -Plaquenil 200 mg BID; consider lowering to 300 mg daily total if doing well after prednisone taper  -comfortable, low impact exercise encouraged  -For the hand pain and stiffness, I have suggested periodic soaks in either a paraffin wax bath or in a warm Epsom salt bath. I have suggested comfortable and daily use of a stress ball to assist in maintaining and improving  strength. 2. Osteopenia of left thigh: T-score -2.0 femoral neck 2014. No clinical fractures personally or first degree relative with hip fracture.  DXA recently with low femoral neck (not currently on chart yet). She is taking alendronate 70 mg weekly. M85.852 733.90 -alendronate 70 mg weekly  -calcium 1200 mg daily total  -vitamin D3 2709-2760 units daily   3. Long-term use of Plaquenil Z79.899 V58.69 Eye monitoring   4.  Hepatic steatosis: noted on CT recently K76.0 571.8 -she will monitor with Dr. Warden Crawford  -weight loss efforts encouraged

## 2017-09-28 NOTE — PROGRESS NOTES
Chief Complaint   Patient presents with    Arthritis     \"Reviewed record in preparation for visit and have obtained necessary documentation. \"

## 2017-09-28 NOTE — MR AVS SNAPSHOT
Visit Information Date & Time Provider Department Dept. Phone Encounter #  
 9/28/2017 10:30 AM Faith Mullen MD [address] [facility] [id number] Upcoming Health Maintenance Date Due DTaP/Tdap/Td series (1 - Tdap) 8/4/1968 BREAST CANCER SCRN MAMMOGRAM 8/4/1997 FOBT Q 1 YEAR AGE 50-75 8/4/1997 Pneumococcal 65+ Low/Medium Risk (1 of 2 - PCV13) 8/4/2012 MEDICARE YEARLY EXAM 8/4/2012 INFLUENZA AGE 9 TO ADULT 8/1/2017 GLAUCOMA SCREENING Q2Y 6/14/2018 Allergies as of 9/28/2017  Review Complete On: 9/28/2017 By: Faith Mullen MD  
  
 Severity Noted Reaction Type Reactions Cottonseed  11/25/2015    Cough Lamisil [Terbinafine]  10/16/2012    Rash Malt Extract  11/25/2015    Cough, Other (comments) Asthma Current Immunizations  Reviewed on 9/28/2017 Name Date Influenza Vaccine 10/31/2016, 10/25/2015 Zoster Vaccine, Live 11/25/2013 Reviewed by Faith Mullen MD on 9/28/2017 at 11:14 AM  
You Were Diagnosed With   
  
 Codes Comments [facility]    -  Primary ICD-10-CM: Y30.5 ICD-9-CM: 714.9 Osteopenia of left thigh     ICD-10-CM: G16.000 ICD-9-CM: 733.90 Long-term use of Plaquenil     ICD-10-CM: Z79.899 ICD-9-CM: V58.69 Hepatic steatosis     ICD-10-CM: K76.0 ICD-9-CM: 571.8 Vitals BP Pulse Temp Resp Height(growth percentile) Weight(growth percentile) 117/75 (BP 1 Location: Left arm, BP Patient Position: Sitting) 76 98.3 °F (36.8 °C) (Oral) 20 5' 2\" (1.575 m) 210 lb (95.3 kg) LMP SpO2 BMI OB Status Smoking Status 11/25/2002 99% 38.41 kg/m2 Postmenopausal Never Smoker Vitals History BMI and BSA Data Body Mass Index Body Surface Area  
 38.41 kg/m 2 2.04 m 2 Preferred Pharmacy Pharmacy Name Phone [address], [facility] [phone] Your Updated Medication List  
  
   
 This list is accurate as of: 9/28/17 11:37 AM.  Always use your most recent med list.  
  
  
  
  
 albuterol 2.5 mg /3 mL (0.083 %) nebulizer solution Commonly known as:  PROVENTIL VENTOLIN  
  
 alendronate 70 mg tablet Commonly known as:  FOSAMAX 70 mg once weekly. Please review proper mode of taking  
  
 amoxicillin 875 mg tablet Commonly known as:  AMOXIL  
  
 calcium-cholecalciferol (D3) tablet Commonly known as:  CALTRATE 600+D Take 1 Tab by mouth two (2) times a day. cholecalciferol 1,000 unit Cap Commonly known as:  VITAMIN D3 Take 1,000 Units by mouth two (2) times a day. DIOVAN -25 mg per tablet Generic drug:  valsartan-hydroCHLOROthiazide Take 1 Tab by mouth daily. DULERA 200-5 mcg/actuation HFA inhaler Generic drug:  mometasone-formoterol Take 2 Puffs by inhalation two (2) times a day. GLUCOSAMINE 1500 COMPLEX 500-400 mg capsule Generic drug:  glucosamine-chondroit-vit c-mn Take 2 Caps by mouth nightly. hydroxychloroquine 200 mg tablet Commonly known as:  PLAQUENIL  
TAKE 1 TABLET TWICE A DAY  
  
 ipratropium 0.02 % Soln Commonly known as:  ATROVENT  
  
 ketoconazole 2 % topical cream  
Commonly known as:  NIZORAL Apply  to affected area as needed. magnesium oxide 400 mg tablet Commonly known as:  MAG-OX Take 400 mg by mouth daily. naproxen sodium 220 mg tablet Commonly known as:  NAPROSYN Take 440 mg by mouth daily as needed. NexIUM 40 mg capsule Generic drug:  esomeprazole Take  by mouth daily. olopatadine 0.6 % Spry Commonly known as:  PATANASE  
1 Squirt by Both Nostrils route two (2) times a day. predniSONE 1 mg tablet Commonly known as:  Pennie Look Along with 5 mg tab, take two 1 mg tabs for total 7 mg daily. Lower by 1 mg every 2-4 weeks if tolerated SINGULAIR 10 mg tablet Generic drug:  montelukast  
Take 10 mg by mouth daily. ZyrTEC 10 mg tablet Generic drug:  cetirizine Take  by mouth daily. Patient Instructions Forward upcoming labs Prednisone 2 mg daily, alternating with 3 mg daily for 2 weeks. Then 2 mg daily for 2-4 weeks. Then 2 mg daily alternating with 1 mg daily for 2-4 weeks, then 1 mg daily for 2 weeks. Then 1 mg every other day for 2-4 weeks, then stop For the hand pain and stiffness, I have suggested periodic soaks in either a paraffin wax bath or in a warm Epsom salt bath. I have suggested comfortable and daily use of a stress ball to assist in maintaining and improving  strength. Introducing [facility] SERVICES! Dear Mary Anne Lara: Thank you for requesting a [employer] account. Our records indicate that you already have an active [employer] account. You can access your account anytime at [email or web address] Did you know that you can access your hospital and ER discharge instructions at any time in [employer]? You can also review all of your test results from your hospital stay or ER visit. Additional Information If you have questions, please visit the Frequently Asked Questions section of the [employer] website at [email or web address] Remember, [employer] is NOT to be used for urgent needs. For medical emergencies, dial 911. Now available from your iPhone and Android! Please provide this summary of care documentation to your next provider. Your primary care clinician is listed as Reny Laguerre. If you have any questions after today's visit, please call [phone].

## 2017-09-28 NOTE — PATIENT INSTRUCTIONS
Forward upcoming labs    Prednisone 2 mg daily, alternating with 3 mg daily for 2 weeks. Then 2 mg daily for 2-4 weeks. Then 2 mg daily alternating with 1 mg daily for 2-4 weeks, then 1 mg daily for 2 weeks. Then 1 mg every other day for 2-4 weeks, then stop    For the hand pain and stiffness, I have suggested periodic soaks in either a paraffin wax bath or in a warm Epsom salt bath. I have suggested comfortable and daily use of a stress ball to assist in maintaining and improving  strength.

## 2019-01-21 ENCOUNTER — OFFICE VISIT (OUTPATIENT)
Dept: RHEUMATOLOGY | Age: 74
End: 2019-01-21

## 2019-01-21 VITALS
TEMPERATURE: 98 F | BODY MASS INDEX: 39.75 KG/M2 | WEIGHT: 216 LBS | HEIGHT: 62 IN | RESPIRATION RATE: 18 BRPM | DIASTOLIC BLOOD PRESSURE: 78 MMHG | SYSTOLIC BLOOD PRESSURE: 161 MMHG | HEART RATE: 80 BPM

## 2019-01-21 DIAGNOSIS — M19.042 PRIMARY OSTEOARTHRITIS OF BOTH HANDS: ICD-10-CM

## 2019-01-21 DIAGNOSIS — M17.0 PRIMARY OSTEOARTHRITIS OF BOTH KNEES: ICD-10-CM

## 2019-01-21 DIAGNOSIS — M19.041 PRIMARY OSTEOARTHRITIS OF BOTH HANDS: ICD-10-CM

## 2019-01-21 DIAGNOSIS — M06.09 SERONEGATIVE RHEUMATOID ARTHRITIS OF MULTIPLE SITES (HCC): Primary | ICD-10-CM

## 2019-01-21 DIAGNOSIS — M22.2X2 PATELLOFEMORAL ARTHRALGIA OF BOTH KNEES: ICD-10-CM

## 2019-01-21 DIAGNOSIS — M22.2X1 PATELLOFEMORAL ARTHRALGIA OF BOTH KNEES: ICD-10-CM

## 2019-01-21 DIAGNOSIS — Z79.899 LONG-TERM USE OF HYDROXYCHLOROQUINE: ICD-10-CM

## 2019-01-21 RX ORDER — ATORVASTATIN CALCIUM 10 MG/1
TABLET, FILM COATED ORAL DAILY
COMMUNITY

## 2019-01-21 RX ORDER — LOSARTAN POTASSIUM 100 MG/1
100 TABLET ORAL DAILY
COMMUNITY

## 2019-01-21 NOTE — PATIENT INSTRUCTIONS
STOP hydroxychloroquine Adalimumab (By injection) Adalimumab (yr-rl-RUF-ue-mab) Treats arthritis, plaque psoriasis, ankylosing spondylitis, Crohn disease, ulcerative colitis, hidradenitis suppurativa, and uveitis. Brand Name(s): Humira There may be other brand names for this medicine. When This Medicine Should Not Be Used: This medicine is not right for everyone. Do not use it if you had an allergic reaction to adalimumab. How to Use This Medicine:  
Injectable · Your doctor will prescribe your exact dose and tell you how often it should be given. This medicine is given as a shot under your skin. · A nurse or other health provider will give you this medicine. · You may be taught how to give your medicine at home. Make sure you understand all instructions before giving yourself an injection. Do not use more medicine or use it more often than your doctor tells you to. · You will be shown the body areas where this shot can be given. Use a different body area each time you give yourself a shot. Keep track of where you give each shot to make sure you rotate body areas. Do not inject into skin areas that are red, bruised, tender, or hard. If you have psoriasis, do not inject into a raised, thick, red, or scaly skin patch or into skin lesions. · This medicine should come with a Medication Guide. Ask your pharmacist for a copy if you do not have one. · Missed dose: Take a dose as soon as you remember. If it is almost time for your next dose, wait until then and take a regular dose. Do not take extra medicine to make up for a missed dose. · If you store this medicine at home, keep it in the refrigerator. Do not freeze. Protect the medicine from light. Keep your medicine and supplies in the original packages until you are ready to use them. Drugs and Foods to Avoid: Ask your doctor or pharmacist before using any other medicine, including over-the-counter medicines, vitamins, and herbal products. · Some foods and medicines can affect how adalimumab works. Tell your doctor if you are using any of the following: ¨ Abatacept, anakinra, azathioprine, cyclosporine, mercaptopurine, rituximab, theophylline ¨ A blood thinner (including warfarin) ¨ Medicine that weakens the immune system (including a steroid or cancer medicine) · This medicine may interfere with vaccines. Ask your doctor before you get a flu shot or any other vaccines. Warnings While Using This Medicine: · Tell your doctor if you are pregnant or breastfeeding, or if you have liver disease, a history of cancer, COPD, heart failure, diabetes, psoriasis, multiple sclerosis, optic neuritis, problems with your immune system, or a history of Guillain-Barré syndrome. Tell your doctor if you have any type of infection (such as hepatitis B or tuberculosis) or an infection that keeps coming back. · This medicine may cause the following problems:  
¨ Increased risk for infection ¨ Increased risk of certain cancers, such as lymphoma or leukemia ¨ New or worsening heart failure · Tell your doctor if you have a latex allergy. The needle cover of the syringe contains latex and may cause allergic reactions. · You will need to have a skin test for tuberculosis (TB) before you start this medicine. Tell your doctor if you or anyone in your home has ever had a positive TB skin test or been exposed to TB. · This medicine may make you bleed, bruise, or get infections more easily. Take precautions to prevent illness and injury. Wash your hands often. · Your doctor will do lab tests at regular visits to check on the effects of this medicine. Keep all appointments. · Throw away used needles in a hard, closed container that the needles cannot poke through. Keep this container away from children and pets. · Keep all medicine out of the reach of children. Never share your medicine with anyone. Possible Side Effects While Using This Medicine: Call your doctor right away if you notice any of these side effects: · Allergic reaction: Itching or hives, swelling in your face or hands, swelling or tingling in your mouth or throat, chest tightness, trouble breathing · Blistering, peeling, red skin rash, or red, scaly patches on the skin · Change in how much or how often you urinate, painful urination · Changes in vision · Chest pain, uneven heartbeat, trouble breathing · Cough, fever, chills, runny or stuffy nose, sore throat, and body aches · Dark urine or pale stools, nausea, vomiting, loss of appetite, stomach pain, yellow skin or eyes · Numbness, tingling, or burning pain in your hands, arms, legs, or feet, or joint pain · Rapid weight gain, swelling in your hands, ankles, lower legs, or feet · Sores or white patches on your lips, mouth, or throat · Swollen glands in your neck, underarms, or groin · Unusual bleeding, bruising, tiredness, weakness, or weight loss If you notice these less serious side effects, talk with your doctor: · Back pain · Headache · Redness, itching, bruising, bleeding, pain, or swelling where the shot was given If you notice other side effects that you think are caused by this medicine, tell your doctor. Call your doctor for medical advice about side effects. You may report side effects to FDA at 3-842-FDA-8773 © 2017 [facility] Information is for End User's use only and may not be sold, redistributed or otherwise used for commercial purposes. The above information is an  only. It is not intended as medical advice for individual conditions or treatments. Talk to your doctor, nurse or pharmacist before following any medical regimen to see if it is safe and effective for you.

## 2019-01-23 LAB
25(OH)D3+25(OH)D2 SERPL-MCNC: 44 NG/ML (ref 30–100)
ALBUMIN SERPL ELPH-MCNC: 3.6 G/DL (ref 2.9–4.4)
ALBUMIN SERPL-MCNC: 3.8 G/DL (ref 3.5–4.8)
ALBUMIN/GLOB SERPL: 1.2 {RATIO} (ref 0.7–1.7)
ALBUMIN/GLOB SERPL: 1.4 {RATIO} (ref 1.2–2.2)
ALP SERPL-CCNC: 165 IU/L (ref 39–117)
ALPHA1 GLOB SERPL ELPH-MCNC: 0.2 G/DL (ref 0–0.4)
ALPHA2 GLOB SERPL ELPH-MCNC: 0.7 G/DL (ref 0.4–1)
ALT SERPL-CCNC: 31 IU/L (ref 0–32)
AST SERPL-CCNC: 22 IU/L (ref 0–40)
B-GLOBULIN SERPL ELPH-MCNC: 1.2 G/DL (ref 0.7–1.3)
BASOPHILS # BLD AUTO: 0 X10E3/UL (ref 0–0.2)
BASOPHILS NFR BLD AUTO: 0 %
BILIRUB SERPL-MCNC: 0.3 MG/DL (ref 0–1.2)
BUN SERPL-MCNC: 19 MG/DL (ref 8–27)
BUN/CREAT SERPL: 26 (ref 12–28)
CALCIUM SERPL-MCNC: 9.6 MG/DL (ref 8.7–10.3)
CCP IGA+IGG SERPL IA-ACNC: 5 UNITS (ref 0–19)
CHLORIDE SERPL-SCNC: 103 MMOL/L (ref 96–106)
CO2 SERPL-SCNC: 21 MMOL/L (ref 20–29)
COMMENT, 144067: NORMAL
CREAT SERPL-MCNC: 0.74 MG/DL (ref 0.57–1)
CRP SERPL-MCNC: 1.7 MG/L (ref 0–4.9)
EOSINOPHIL # BLD AUTO: 0.3 X10E3/UL (ref 0–0.4)
EOSINOPHIL NFR BLD AUTO: 5 %
ERYTHROCYTE [DISTWIDTH] IN BLOOD BY AUTOMATED COUNT: 15.9 % (ref 12.3–15.4)
ERYTHROCYTE [SEDIMENTATION RATE] IN BLOOD BY WESTERGREN METHOD: 7 MM/HR (ref 0–40)
GAMMA GLOB SERPL ELPH-MCNC: 0.8 G/DL (ref 0.4–1.8)
GAMMA INTERFERON BACKGROUND BLD IA-ACNC: 0.03 IU/ML
GLOBULIN SER CALC-MCNC: 2.7 G/DL (ref 1.5–4.5)
GLOBULIN SER CALC-MCNC: 2.9 G/DL (ref 2.2–3.9)
GLUCOSE SERPL-MCNC: 99 MG/DL (ref 65–99)
HBV CORE AB SERPL QL IA: NEGATIVE
HBV CORE IGM SERPL QL IA: NEGATIVE
HBV E AB SERPL QL IA: NEGATIVE
HBV E AG SERPL QL IA: NEGATIVE
HBV SURFACE AB SER QL: NON REACTIVE
HBV SURFACE AG SERPL QL IA: NEGATIVE
HCT VFR BLD AUTO: 34.4 % (ref 34–46.6)
HCV AB S/CO SERPL IA: <0.1 S/CO RATIO (ref 0–0.9)
HGB BLD-MCNC: 10.9 G/DL (ref 11.1–15.9)
IMM GRANULOCYTES # BLD AUTO: 0 X10E3/UL (ref 0–0.1)
IMM GRANULOCYTES NFR BLD AUTO: 0 %
LYMPHOCYTES # BLD AUTO: 1.5 X10E3/UL (ref 0.7–3.1)
LYMPHOCYTES NFR BLD AUTO: 26 %
M PROTEIN SERPL ELPH-MCNC: NORMAL G/DL
M TB IFN-G BLD-IMP: NEGATIVE
M TB IFN-G CD4+ BCKGRND COR BLD-ACNC: 0.02 IU/ML
MCH RBC QN AUTO: 26.3 PG (ref 26.6–33)
MCHC RBC AUTO-ENTMCNC: 31.7 G/DL (ref 31.5–35.7)
MCV RBC AUTO: 83 FL (ref 79–97)
MITOGEN IGNF BLD-ACNC: >10 IU/ML
MONOCYTES # BLD AUTO: 0.6 X10E3/UL (ref 0.1–0.9)
MONOCYTES NFR BLD AUTO: 11 %
NEUTROPHILS # BLD AUTO: 3.4 X10E3/UL (ref 1.4–7)
NEUTROPHILS NFR BLD AUTO: 58 %
PLATELET # BLD AUTO: 261 X10E3/UL (ref 150–379)
PLEASE NOTE, 011150: NORMAL
POTASSIUM SERPL-SCNC: 5 MMOL/L (ref 3.5–5.2)
PROT PATTERN SERPL ELPH-IMP: NORMAL
PROT SERPL-MCNC: 6.5 G/DL (ref 6–8.5)
QUANTIFERON INCUBATION, QF1T: NORMAL
QUANTIFERON TB2 AG: 0.02 IU/ML
RBC # BLD AUTO: 4.14 X10E6/UL (ref 3.77–5.28)
RHEUMATOID FACT SERPL-ACNC: 11.9 IU/ML (ref 0–13.9)
SERVICE CMNT-IMP: NORMAL
SODIUM SERPL-SCNC: 142 MMOL/L (ref 134–144)
WBC # BLD AUTO: 5.8 X10E3/UL (ref 3.4–10.8)

## 2019-02-06 ENCOUNTER — DOCUMENTATION ONLY (OUTPATIENT)
Dept: RHEUMATOLOGY | Age: 74
End: 2019-02-06

## 2019-04-08 ENCOUNTER — TELEPHONE (OUTPATIENT)
Dept: RHEUMATOLOGY | Age: 74
End: 2019-04-08

## 2019-04-08 NOTE — TELEPHONE ENCOUNTER
Left message to call the office to answer the question is patient on the Humira CF? Received approval for Humira CF pen 40 mg/0.4ml Ref# [id number], good 2/8/2019-2/8/2020, with co pay of Zero. [facility] to ship on 2/19/2019.

## 2019-04-23 ENCOUNTER — OFFICE VISIT (OUTPATIENT)
Dept: RHEUMATOLOGY | Age: 74
End: 2019-04-23

## 2019-04-23 VITALS
WEIGHT: 220 LBS | DIASTOLIC BLOOD PRESSURE: 89 MMHG | HEART RATE: 87 BPM | RESPIRATION RATE: 18 BRPM | HEIGHT: 62 IN | TEMPERATURE: 97.8 F | SYSTOLIC BLOOD PRESSURE: 169 MMHG | BODY MASS INDEX: 40.48 KG/M2

## 2019-04-23 DIAGNOSIS — M22.2X2 PATELLOFEMORAL ARTHRALGIA OF BOTH KNEES: ICD-10-CM

## 2019-04-23 DIAGNOSIS — G89.29 CHRONIC BACK PAIN GREATER THAN 3 MONTHS DURATION: ICD-10-CM

## 2019-04-23 DIAGNOSIS — M17.0 PRIMARY OSTEOARTHRITIS OF BOTH KNEES: ICD-10-CM

## 2019-04-23 DIAGNOSIS — M22.2X1 PATELLOFEMORAL ARTHRALGIA OF BOTH KNEES: ICD-10-CM

## 2019-04-23 DIAGNOSIS — Z79.60 LONG-TERM USE OF IMMUNOSUPPRESSANT MEDICATION: ICD-10-CM

## 2019-04-23 DIAGNOSIS — M06.09 SERONEGATIVE RHEUMATOID ARTHRITIS OF MULTIPLE SITES (HCC): Primary | ICD-10-CM

## 2019-04-23 DIAGNOSIS — M54.9 CHRONIC BACK PAIN GREATER THAN 3 MONTHS DURATION: ICD-10-CM

## 2019-04-23 PROBLEM — Z79.899 LONG-TERM USE OF PLAQUENIL: Status: RESOLVED | Noted: 2017-09-28 | Resolved: 2019-04-23

## 2019-04-23 PROBLEM — E66.01 SEVERE OBESITY (HCC): Status: ACTIVE | Noted: 2019-04-23

## 2019-04-23 NOTE — PROGRESS NOTES
Chief Complaint   Patient presents with    Joint Pain    Arthritis     1. Have you been to the ER, urgent care clinic since your last visit? Hospitalized since your last visit? No    2. Have you seen or consulted any other health care providers outside of the [address] [place] [address] since your last visit? Include any pap smears or colon screening.  Yes When: March 2019 Where: Dr. Faby Rogers Reason for visit: Infected hang nail-MRSA

## 2019-04-23 NOTE — PROGRESS NOTES
REASON FOR VISIT    This is a follow-up visit for Ms. Francis for Seronegative Erosive Rheumatoid Arthritis. Inflammatory arthritis phenotype includes:  Anti-CCP positive: no  Rheumatoid factor positive: no  Erosive disease: yes   Extra-articular manifestations include: none    Immunosuppression Screening (1/21/2019): Quantiferon TB: N/A  PPD:  Not performed  Hepatitis B: negative   Hepatitis C: negative      Therapy History includes:  Current DMARD therapy include: Humira 40 mg every 14 days (2/2019 to present)  Prior DMARD therapy include: hydroxychloroquine 400 mg daily (1/2016 to 12/2017), hydroxychloroquine 600 mg daily (12/2017 to 1/21/2019; per PCP)  Discontinued DMARDs because of inefficacy: hydroxychloroquine   Discontinued DMARDs because of side effects: None  Contra-Indicated DMARDs because of fatty liver/hepatitis: hydroxychloroquine, sulfasalazine, methotrexate, leflunomide    Immunization History   Administered Date(s) Administered    Influenza Vaccine 10/25/2015, 10/31/2016    Zoster Vaccine, Live 11/25/2013     Patient Active Problem List   Diagnosis Code    Unspecified sinusitis (chronic) J32.9    Osteopenia M85.80    Seronegative rheumatoid arthritis of multiple sites ([facility] [address] M06.09    Patellofemoral arthralgia of both knees M22.2X1, M22.2X2    Primary osteoarthritis of both knees M17.0    Primary osteoarthritis of both hands M19.041, M19.042    Severe obesity ([facility] [address] E66.01    Long-term use of immunosuppressant medication Z79.899     [facility]    Ms. Kvng Guerrero returns for a follow-up. On her last visit, I ordered labs and radiographs and due to fatty liver I started Humira 40 mg every 14 days, which she received in 2/2019. I also discontined hydroxychloroquine and referred her to physical therapy for her knees, which has helped. She wants to do PT for her back. She has been on Humira since 2/2019 with good tolerance.  She notes breakthrough 24 hours before her dose.    Today, she feels ok. She feels better. She has mild pain in her 2nd MCPs. She has aching, associated with swelling and morning stiffness lasting 30 minutes. Warm water helps. She denies fever, weight loss, blurred vision, vision loss, oral ulcers, ankle swelling, dry cough, dyspnea, nausea, vomiting, dysphagia, abdominal pain, black or bloody stool, fall since last visit, rash, easy bruising and increased thirst.    Ms. Ji Garcia has continued her medications for arthritis and reports good tolerance without significant side effects. Last toxicity monitoring by blood work was done on 1/21/2019 and did not reveal any significant adverse effects, . Most recent inflammatory markers from 1/21/2019 revealed a ESR 7 mm/hr (previously 11, 4 mm/hr) and CRP 1.7 mg/L (previously 4.0, 1.6 mg/L). The patient has not had any interval hospital admissions, infections, or surgeries. REVIEW OF SYSTEMS    A comprehensive review of systems was performed and pertinent results are documented in the HPI, review of systems is otherwise non-contributory. PAST MEDICAL HISTORY    She has a past medical history of Asthma, Chronic sinusitis, Fibromyalgia, GERD (gastroesophageal reflux disease), Hypertension, Lower GI bleed (2014), Multiple allergies, Obstructive sleep apnea on CPAP, and Osteopenia. FAMILY HISTORY    Her family history includes Coronary Artery Disease in her father; Diabetes in her mother. SOCIAL HISTORY    She reports that she has never smoked. She has never used smokeless tobacco. She reports that she does not drink alcohol or use drugs. MEDICATIONS    Current Outpatient Medications   Medication Sig Dispense Refill    atorvastatin (LIPITOR) 10 mg tablet Take  by mouth daily.  losartan (COZAAR) 100 mg tablet Take 100 mg by mouth daily.  adalimumab (HUMIRA,CF, PEN) 40 mg/0.4 mL pnkt 40 mg by SubCUTAneous route every fourteen (14) days.  6 Kit 11    calcium-cholecalciferol, D3, (CALTRATE 600+D) tablet Take 1 Tab by mouth two (2) times a day.  alendronate (FOSAMAX) 70 mg tablet 70 mg once weekly. Please review proper mode of taking 4 Tab 11    magnesium oxide (MAG-OX) 400 mg tablet Take 400 mg by mouth daily.  Cholecalciferol, Vitamin D3, 1,000 unit cap Take 1,000 Units by mouth two (2) times a day.  ketoconazole (NIZORAL) 2 % topical cream Apply  to affected area as needed.  DULERA 200-5 mcg/actuation HFA inhaler Take 2 Puffs by inhalation two (2) times a day.  glucosamine-chondroit-vit c-mn (GLUCOSAMINE 1500 COMPLEX) 500-400 mg capsule Take 2 Caps by mouth nightly.  montelukast (SINGULAIR) 10 mg tablet Take 10 mg by mouth daily.  esomeprazole (NEXIUM) 40 mg capsule Take  by mouth daily.  cetirizine (ZYRTEC) 10 mg tablet Take  by mouth daily. ALLERGIES    Allergies   Allergen Reactions    Cottonseed Cough    Lamisil [Terbinafine] Rash    Malt Extract Cough and Other (comments)     Asthma         PHYSICAL EXAMINATION    Visit Vitals  /89   Pulse 87   Temp 97.8 °F (36.6 °C)   Resp 18   Ht 5' 2\" (1.575 m)   Wt 220 lb (99.8 kg)   BMI 40.24 kg/m²     Body mass index is 40.24 kg/m². General: Patient is alert, oriented x 3, not in acute distress    HEENT:   Sclerae are not injected and appear moist.  There is no alopecia. Neck is supple     Cardiovascular:  Heart is regular rate and rhythm, no murmurs. Chest:  Lungs are clear to auscultation bilaterally. No rhonchi, wheezes, or crackles.     Extremities:  Free of clubbing, cyanosis, edema    Neurological exam:  Muscle strength is full in upper and lower extremities     Skin exam:    Psoriasis:     no  Nail Pitting:     no  Onycholysis:     no  Palmoplantar pustulosis:   no  Acne fulminans:    no  Acne conglobata:    no  Hidradenitis Suppurativa:   no  Dissecting cellulitis of the scalp:  no  Pilonidal sinus:    no  Erythema nodosum:    no  Non-Scarring Alopecia:  no  Discoid Lupus:   no  Subacute Cutaneous Lupus:   no  Heliotrope Rash:   no  Upper Arm Erythema:   no  Shawl Sign:    no  V-sign:     no  Holster sign:    no  Gottron's papules:   no  Gottron's sign:    no  Calcinosis:    no  Raynaud's Phenomenon:  no  's Hands:   no  Periungual erythema:   no  Abnormal Nailfold Capillaries: no  Livedo Reticularis:   no  Scalp Erythema:   no  Rheumatoid Nodules:   no    Musculoskeletal:  A comprehensive musculoskeletal exam was performed for all joints of each upper and lower extremity and assessed for swelling, tenderness and range of motion.       Bilateral Armando and Heberden nodes  Bilateral knee crepitus without effusion with patellar laxity and crepitus  Left ankle tenderness with swelling    Z-Deformities:   no  [place] Deformities:  no  Boutonierre's Deformities:  no  Ulnar Deviation:   yes  MCP Subluxation:  no    Joint Count 4/23/2019 1/21/2019   Patient pain (0-100) 5 10   MHAQ 0.25 0.5   Left 1st MCP - Tender 1 1   Left 1st MCP - Swollen 1 1   Left 2nd MCP - Tender 1 1   Left 2nd MCP - Swollen 1 1   Left 3rd MCP - Tender 1 1   Left 3rd MCP - Swollen 1 1   Left 5th MCP - Tender - 1   Left 5th MCP - Swollen - 0   Left 2nd PIP - Tender 1 1   Left 2nd PIP - Swollen 0 1   Left 3rd PIP - Tender 1 1   Left 3rd PIP - Swollen 1 1   Left 4th PIP - Tender - 1   Right elbow - Tender - 1   Right elbow - Swollen - 0   Right wrist- Tender 1 -   Right wrist- Swollen 1 -   Right 1st MCP - Tender 1 1   Right 1st MCP - Swollen 1 1   Right 2nd MCP - Tender 1 1   Right 2nd MCP - Swollen 0 1   Right 3rd MCP - Tender 1 1   Right 3rd MCP - Swollen 1 1   Right 4th MCP - Tender 1 1   Right 4th MCP - Swollen 1 1   Right 5th MCP - Tender 1 1   Right 5th MCP - Swollen 1 1   Right 2nd PIP - Tender 1 1   Right 2nd PIP - Swollen 1 1   Right 3rd PIP - Tender 1 1   Right 3rd PIP - Swollen 1 1   Right 4th PIP - Tender 1 1   Right 4th PIP - Swollen 1 1   Right 5th PIP - Tender - 1   Right 5th PIP - Swollen - 1   Tender Joint Count (Total) 14 17   Swollen Joint Count (Total) 12 14   Physician Assessment (0-10) 4 5   Patient Assessment (0-10) 0.5 2   CDAI Total (calculated) 30.5 38     DATA REVIEW    Laboratory     Recent laboratory results were reviewed, summarized, and discussed with the patient. Imaging    Musculoskeletal Ultrasound    None    Radiographs    Bilateral Hand 1/21/2019: RIGHT: osteopenia. No acute fracture or dislocation. Radiocarpal and intercarpal joints are age-appropriate. No erosion of intercarpal or CMC joints. First MCP joint space narrowing is increased. There are small marginal osteophytes. No erosion. Anterior subluxation of the third MCP joint and joint space narrowing are increased. There are are increased subtle erosions of the third metacarpal head. Fifth MCP joint erosion is unchanged. Third PIP joint space narrowing is mild and unchanged. The DIP joints are within normal limits. LEFT: osteopenia. No acute fracture or dislocation. Mild anterior subluxation of the third MCP joint is increased. Radiocarpal and intercarpal joints are within normal limits. First CMC joint osteoarthritis is mild and new. Third MCP joint space narrowing and subtle erosions are increased. First MCP joint osteoarthritis is mild and new. IP joints are within normal limits. No periosteal reaction or chondrocalcinosis. Bilateral Foot 1/21/2019: RIGHT: osteopenia. No acute fracture or dislocation. Intertarsal and TMT joints are within normal limits. Hallux valgus angulation measures 45 degrees. Mild first MTP joint osteoarthritis. MTS joint subluxation and arthritis are partially imaged. Remaining MTP joints are within normal limits. IP joints are difficult to visualize given the toe positioning. There are soft tissue calcifications adjacent to the distal tibia. LEFT: osteopenia. No acute fracture or dislocation. Moderate plantar calcaneal spur. Mild talonavicular joint osteoarthritis.  Possible third TMT joint osteoarthritis. Hallux valgus angulation measures 40 degrees. Mild first MTP joint osteoarthritis. MTS joint subluxation and arthritis are partially imaged. Mild third PIP and DIP joint osteoarthritis. No evidence of IP joint erosion. Soft tissue calcifications are adjacent to the distal tibia. No periosteal reaction or chondrocalcinosis. Bilateral Hand 11/25/2015: RIGHT: no acute fracture or dislocation. Alignment is anatomic. Severe joint space narrowing is seen at the third MCP joint. Small erosions are suspected in the head of the third metacarpal. Mild joint space narrowing is seen in the first MCP joint. Generalized osteopenia is noted. Soft tissue swelling surrounds the third MCP joint. LEFT: no acute fracture or dislocation. Alignment is anatomic. Severe joint space is seen at the third  MCP joint mild joint space. Mild joint space narrowing is seen at the first MCP joint. No erosions are seen. Generalized osteopenia is noted. There is the suggestion of mild soft tissue swelling surrounding the third MCP joint. CT Imaging    CT Chest without contrast 1/16/2017: The thoracic inlet is unremarkable. No mediastinal nor hilar masses nor adenopathy. No thoracic aortic aneurysm. The central airways are patent. Minimal hypoventilation is appreciated panel placed. The lungs are otherwise clear. There is diffuse low attenuation within the liver parenchyma. The remaining visualized upper abdominal viscera are unremarkable. There are no aggressive appearing osseous lesions. Degenerative changes within the mid thoracic spine. MR Imaging    None    DXA     DXA 11/01/2016: (excluded L1, right hip, distal radius) lumbar spine L1-L3 T score 1.5 (BMD 1.183 g/cm2), left femoral neck T score: -0.5 (0.880 g/cm2). FRAX score 17 % probability in 10 years for major osteoporotic fracture and 3.7 % 10 year probability of hip fracture.      ASSESSMENT AND PLAN    This is a follow-up visit for Ms. Hinckley. 1) Seronegative Erosive Rheumatoid Arthritis. She is maintained on Humira 40 mg every 14 days with good tolerance breakthrough 24 hours before her next dose. She feels better. Her CDAI was 30.5 (previously 38) with 14 tender and 12 swollen joints, consistent with high disease activity. I will continue treatment. Due to her fatty liver, conventional DMARDs, such as sulfasalazine, methotrexate, leflunomide, and hydroxychloroquine are contra-indicated due to monitoring and higher risk potential adverse events. 2) Long Term Use of Immunosuppressants. The patient remains on immunomodulatory medications (Humira) and requires frequent toxicity monitoring by blood work. 3) Bilateral Patellofemoral Arthritis. This is likely the source of her knee pain per history and exam. I had referred her to physical therapy, which helped. 4) Bilateral Knee Osteoarthritis. I referred her to physical therapy which helped. 5) Bilateral Hand Osteoarthritis. I recommended ball squeezing and holding until hand fatigue then alternating to other hand exercises 10 times twice daily. 6) Chronic Back Pain. I referred her to physical therapy. The patient voiced understanding of the aforementioned assessment and plan. Summary of plan was provided in the After Visit Summary patient instructions.      TODAY'S ORDERS    Orders Placed This Encounter    REFERRAL TO PHYSICAL THERAPY     Future Appointments   Date Time Provider Department Center   7/23/2019 10:20 AM Nayana Gabriel MD [place], [place], [address] [facility] [address]    Adult Rheumatology   Rheumatology Ultrasound Certified  [facility]  A Part of [address]   Phone [phone]  Fax [phone]

## 2019-07-23 ENCOUNTER — OFFICE VISIT (OUTPATIENT)
Dept: RHEUMATOLOGY | Age: 74
End: 2019-07-23

## 2019-07-23 VITALS
SYSTOLIC BLOOD PRESSURE: 161 MMHG | DIASTOLIC BLOOD PRESSURE: 87 MMHG | HEIGHT: 62 IN | BODY MASS INDEX: 40.48 KG/M2 | TEMPERATURE: 97.9 F | WEIGHT: 220 LBS | HEART RATE: 93 BPM | RESPIRATION RATE: 18 BRPM

## 2019-07-23 DIAGNOSIS — Z79.60 LONG-TERM USE OF IMMUNOSUPPRESSANT MEDICATION: ICD-10-CM

## 2019-07-23 DIAGNOSIS — M22.2X2 PATELLOFEMORAL ARTHRALGIA OF BOTH KNEES: ICD-10-CM

## 2019-07-23 DIAGNOSIS — M48.062 SPINAL STENOSIS OF LUMBAR REGION WITH NEUROGENIC CLAUDICATION: ICD-10-CM

## 2019-07-23 DIAGNOSIS — G89.29 CHRONIC BACK PAIN GREATER THAN 3 MONTHS DURATION: ICD-10-CM

## 2019-07-23 DIAGNOSIS — M54.9 CHRONIC BACK PAIN GREATER THAN 3 MONTHS DURATION: ICD-10-CM

## 2019-07-23 DIAGNOSIS — M22.2X1 PATELLOFEMORAL ARTHRALGIA OF BOTH KNEES: ICD-10-CM

## 2019-07-23 DIAGNOSIS — M06.09 SERONEGATIVE RHEUMATOID ARTHRITIS OF MULTIPLE SITES (HCC): Primary | ICD-10-CM

## 2019-07-23 RX ORDER — OLOPATADINE HYDROCHLORIDE 665 UG/1
SPRAY NASAL 2 TIMES DAILY
COMMUNITY

## 2019-07-23 NOTE — PATIENT INSTRUCTIONS
Call [facility] and inform them that I changed Humira to every 7 day dosing. I referred you to Dr. 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 Please take the time to express how your experience was with me so I can better help and serve you better. Please contact me with any questions or concerns.     Johnny Thompson MD, [address] [facility]    Adult Rheumatology   Rheumatology Ultrasound Certified  [address]  [facility], [address]   Phone [phone]  Fax [phone]

## 2019-07-23 NOTE — PROGRESS NOTES
Chief Complaint   Patient presents with    Arthritis     1. Have you been to the ER, urgent care clinic since your last visit? Hospitalized since your last visit? No    2. Have you seen or consulted any other health care providers outside of the [address] [place] [address] since your last visit? Include any pap smears or colon screening.  Yes Her PCP for cut on her leg

## 2019-07-23 NOTE — PROGRESS NOTES
REASON FOR VISIT    This is a follow-up visit for Ms. Francis for Seronegative Erosive Rheumatoid Arthritis. Inflammatory arthritis phenotype includes:  Anti-CCP positive: no  Rheumatoid factor positive: no  Erosive disease: yes   Extra-articular manifestations include: none    Immunosuppression Screening (1/21/2019): Quantiferon TB: negative  PPD:  Not performed  Hepatitis B: negative   Hepatitis C: negative      Therapy History includes:  Current DMARD therapy include: Humira 40 mg every 14 days (2/2019 to present)  Prior DMARD therapy include: hydroxychloroquine 400 mg daily (1/2016 to 12/2017), hydroxychloroquine 600 mg daily (12/2017 to 1/21/2019; per PCP)  Discontinued DMARDs because of inefficacy: hydroxychloroquine   Discontinued DMARDs because of side effects: None  Contra-Indicated DMARDs because of fatty liver/hepatitis: hydroxychloroquine, sulfasalazine, methotrexate, leflunomide    Immunization History   Administered Date(s) Administered    Influenza Vaccine 10/25/2015, 10/31/2016    Zoster Vaccine, Live 11/25/2013     Patient Active Problem List   Diagnosis Code    Unspecified sinusitis (chronic) J32.9    Osteopenia M85.80    Seronegative rheumatoid arthritis of multiple sites ([facility] [address] M06.09    Patellofemoral arthralgia of both knees M22.2X1, M22.2X2    Primary osteoarthritis of both knees M17.0    Primary osteoarthritis of both hands M19.041, M19.042    Severe obesity ([facility] [address] E66.01    Long-term use of immunosuppressant medication Z79.899     León Castaneda    Ms. Bennie Lima returns for a follow-up. On her last visit, I continued Humira 40 mg every 14 days, which she received in 2/2019, which she has taken with good tolerance. She endorses breakthrough 24 hours to 48 hours. I referred her to physical therapy for her back pain, which helped. Today, she feels worse today. She feels more pain in her hands that is aching that is usually worse by the evening.  There is swelling in her hands. She has morning stiffness lasting less than 60 minutes but more than 30 minutes. She has difficulty making a fist in her right. Warm water helps. She enjoys washing dishes. She complains of lower back pain that is limits her from walking distances and she has issues standing up straight. She endorses ankle swelling. She denies fever, weight loss, blurred vision, vision loss, oral ulcers, dry cough, dyspnea, nausea, vomiting, dysphagia, abdominal pain, black or bloody stool, fall since last visit, rash, easy bruising and increased thirst.    Ms. Dudley Rodriguez has continued her medications for arthritis and reports good tolerance without significant side effects. Last toxicity monitoring by blood work was done on 1/21/2019 and did not reveal any significant adverse effects, . Most recent inflammatory markers from 1/21/2019 revealed a ESR 7 mm/hr (previously 11, 4 mm/hr) and CRP 1.7 mg/L (previously 4.0, 1.6 mg/L). The patient has not had any interval hospital admissions, infections, or surgeries. REVIEW OF SYSTEMS    A comprehensive review of systems was performed and pertinent results are documented in the HPI, review of systems is otherwise non-contributory. PAST MEDICAL HISTORY    She has a past medical history of Asthma, Chronic sinusitis, Fibromyalgia, GERD (gastroesophageal reflux disease), Hypertension, Lower GI bleed (2014), Multiple allergies, Obstructive sleep apnea on CPAP, and Osteopenia. FAMILY HISTORY    Her family history includes Coronary Artery Disease in her father; Diabetes in her mother. SOCIAL HISTORY    She reports that she has never smoked. She has never used smokeless tobacco. She reports that she does not drink alcohol or use drugs. MEDICATIONS    Current Outpatient Medications   Medication Sig Dispense Refill    olopatadine (PATANASE) 0.6 % spry two (2) times a day.       adalimumab (HUMIRA,CF, PEN) 40 mg/0.4 mL pnkt 40 mg by SubCUTAneous route every seven (7) days. Indications: rheumatoid arthritis 6 Kit 11    atorvastatin (LIPITOR) 10 mg tablet Take  by mouth daily.  losartan (COZAAR) 100 mg tablet Take 100 mg by mouth daily.  calcium-cholecalciferol, D3, (CALTRATE 600+D) tablet Take 1 Tab by mouth two (2) times a day.  alendronate (FOSAMAX) 70 mg tablet 70 mg once weekly. Please review proper mode of taking 4 Tab 11    magnesium oxide (MAG-OX) 400 mg tablet Take 400 mg by mouth daily.  ketoconazole (NIZORAL) 2 % topical cream Apply  to affected area as needed.  DULERA 200-5 mcg/actuation HFA inhaler Take 2 Puffs by inhalation two (2) times a day.  glucosamine-chondroit-vit c-mn (GLUCOSAMINE 1500 COMPLEX) 500-400 mg capsule Take 2 Caps by mouth nightly.  montelukast (SINGULAIR) 10 mg tablet Take 10 mg by mouth daily.  esomeprazole (NEXIUM) 40 mg capsule Take  by mouth daily.  cetirizine (ZYRTEC) 10 mg tablet Take  by mouth daily.  Cholecalciferol, Vitamin D3, 1,000 unit cap Take 1,000 Units by mouth two (2) times a day. ALLERGIES    Allergies   Allergen Reactions    Cottonseed Cough    Lamisil [Terbinafine] Rash    Malt Extract Cough and Other (comments)     Asthma         PHYSICAL EXAMINATION    Visit Vitals  /87   Pulse 93   Temp 97.9 °F (36.6 °C)   Resp 18   Ht 5' 2\" (1.575 m)   Wt 220 lb (99.8 kg)   BMI 40.24 kg/m²     Body mass index is 40.24 kg/m². General: Patient is alert, oriented x 3, not in acute distress    HEENT:   Sclerae are not injected and appear moist.  There is no alopecia. Neck is supple     Cardiovascular:  Heart is regular rate and rhythm, no murmurs. Chest:  Lungs are clear to auscultation bilaterally. No rhonchi, wheezes, or crackles.     Extremities:  Free of clubbing, cyanosis, edema    Neurological exam:  Muscle strength is full in upper and lower extremities     Skin exam:    Psoriasis:     no  Nail Pitting:     no  Onycholysis:     no  Palmoplantar pustulosis:   no  Acne fulminans:    no  Acne conglobata:    no  Hidradenitis Suppurativa:   no  Dissecting cellulitis of the scalp:  no  Pilonidal sinus:    no  Erythema nodosum:    no  Non-Scarring Alopecia:  no  Discoid Lupus:   no  Subacute Cutaneous Lupus:   no  Heliotrope Rash:   no  Upper Arm Erythema:   no  Shawl Sign:    no  V-sign:     no  Holster sign:    no  Gottron's papules:   no  Gottron's sign:    no  Calcinosis:    no  Raynaud's Phenomenon:  no  's Hands:   no  Periungual erythema:   no  Abnormal Nailfold Capillaries: no  Livedo Reticularis:   no  Scalp Erythema:   no  Rheumatoid Nodules:   no    Musculoskeletal:  A comprehensive musculoskeletal exam was performed for all joints of each upper and lower extremity and assessed for swelling, tenderness and range of motion.       Bilateral Armando and Heberden nodes  Bilateral knee crepitus without effusion with patellar laxity and crepitus  Left ankle tenderness with swelling    Z-Deformities:   no  McLean Neck Deformities:  no  Boutonierre's Deformities:  no  Ulnar Deviation:   yes  MCP Subluxation:  no    Joint Count 7/23/2019 4/23/2019 1/21/2019   Patient pain (0-100) 5 5 10   MHAQ 0.5 0.25 0.5   Left wrist- Tender 1 - -   Left wrist- Swollen 0 - -   Left 1st MCP - Tender 1 1 1   Left 1st MCP - Swollen 1 1 1   Left 2nd MCP - Tender 1 1 1   Left 2nd MCP - Swollen 1 1 1   Left 3rd MCP - Tender 1 1 1   Left 3rd MCP - Swollen 1 1 1   Left 4th MCP - Tender 1 - -   Left 4th MCP - Swollen 0 - -   Left 5th MCP - Tender 1 - 1   Left 5th MCP - Swollen 1 - 0   Left 2nd PIP - Tender 1 1 1   Left 2nd PIP - Swollen 1 0 1   Left 3rd PIP - Tender 1 1 1   Left 3rd PIP - Swollen 1 1 1   Left 4th PIP - Tender 1 - 1   Left 4th PIP - Swollen 1 - -   Left 5th PIP - Tender 1 - -   Left 5th PIP - Swollen 0 - -   Right elbow - Tender - - 1   Right elbow - Swollen - - 0   Right wrist- Tender 0 1 -   Right wrist- Swollen 1 1 -   Right 1st MCP - Tender 0 1 1   Right 1st MCP - Swollen 1 1 1   Right 2nd MCP - Tender 0 1 1   Right 2nd MCP - Swollen 1 0 1   Right 3rd MCP - Tender 0 1 1   Right 3rd MCP - Swollen 1 1 1   Right 4th MCP - Tender - 1 1   Right 4th MCP - Swollen - 1 1   Right 5th MCP - Tender 0 1 1   Right 5th MCP - Swollen 1 1 1   Right 2nd PIP - Tender 1 1 1   Right 2nd PIP - Swollen 1 1 1   Right 3rd PIP - Tender 1 1 1   Right 3rd PIP - Swollen 1 1 1   Right 4th PIP - Tender 1 1 1   Right 4th PIP - Swollen 1 1 1   Right 5th PIP - Tender - - 1   Right 5th PIP - Swollen - - 1   Tender Joint Count (Total) 13 14 17   Swollen Joint Count (Total) 15 12 14   Physician Assessment (0-10) 5 4 5   Patient Assessment (0-10) 5.5 0.5 2   CDAI Total (calculated) 38.5 30.5 38     DATA REVIEW    Laboratory     Recent laboratory results were reviewed, summarized, and discussed with the patient. Imaging    Musculoskeletal Ultrasound    None    Radiographs    Bilateral Hand 1/21/2019: RIGHT: osteopenia. No acute fracture or dislocation. Radiocarpal and intercarpal joints are age-appropriate. No erosion of intercarpal or CMC joints. First MCP joint space narrowing is increased. There are small marginal osteophytes. No erosion. Anterior subluxation of the third MCP joint and joint space narrowing are increased. There are are increased subtle erosions of the third metacarpal head. Fifth MCP joint erosion is unchanged. Third PIP joint space narrowing is mild and unchanged. The DIP joints are within normal limits. LEFT: osteopenia. No acute fracture or dislocation. Mild anterior subluxation of the third MCP joint is increased. Radiocarpal and intercarpal joints are within normal limits. First CMC joint osteoarthritis is mild and new. Third MCP joint space narrowing and subtle erosions are increased. First MCP joint osteoarthritis is mild and new. IP joints are within normal limits. No periosteal reaction or chondrocalcinosis. Bilateral Foot 1/21/2019: RIGHT: osteopenia.  No acute fracture or dislocation. Intertarsal and TMT joints are within normal limits. Hallux valgus angulation measures 45 degrees. Mild first MTP joint osteoarthritis. MTS joint subluxation and arthritis are partially imaged. Remaining MTP joints are within normal limits. IP joints are difficult to visualize given the toe positioning. There are soft tissue calcifications adjacent to the distal tibia. LEFT: osteopenia. No acute fracture or dislocation. Moderate plantar calcaneal spur. Mild talonavicular joint osteoarthritis. Possible third TMT joint osteoarthritis. Hallux valgus angulation measures 40 degrees. Mild first MTP joint osteoarthritis. MTS joint subluxation and arthritis are partially imaged. Mild third PIP and DIP joint osteoarthritis. No evidence of IP joint erosion. Soft tissue calcifications are adjacent to the distal tibia. No periosteal reaction or chondrocalcinosis. Bilateral Hand 11/25/2015: RIGHT: no acute fracture or dislocation. Alignment is anatomic. Severe joint space narrowing is seen at the third MCP joint. Small erosions are suspected in the head of the third metacarpal. Mild joint space narrowing is seen in the first MCP joint. Generalized osteopenia is noted. Soft tissue swelling surrounds the third MCP joint. LEFT: no acute fracture or dislocation. Alignment is anatomic. Severe joint space is seen at the third  MCP joint mild joint space. Mild joint space narrowing is seen at the first MCP joint. No erosions are seen. Generalized osteopenia is noted. There is the suggestion of mild soft tissue swelling surrounding the third MCP joint. CT Imaging    CT Chest without contrast 1/16/2017: The thoracic inlet is unremarkable. No mediastinal nor hilar masses nor adenopathy. No thoracic aortic aneurysm. The central airways are patent. Minimal hypoventilation is appreciated panel placed. The lungs are otherwise clear. There is diffuse low attenuation within the liver parenchyma.   The remaining visualized upper abdominal viscera are unremarkable. There are no aggressive appearing osseous lesions. Degenerative changes within the mid thoracic spine. MR Imaging    None    DXA     DXA 11/01/2016: (excluded L1, right hip, distal radius) lumbar spine L1-L3 T score 1.5 (BMD 1.183 g/cm2), left femoral neck T score: -0.5 (0.880 g/cm2). FRAX score 17 % probability in 10 years for major osteoporotic fracture and 3.7 % 10 year probability of hip fracture. ASSESSMENT AND PLAN    This is a follow-up visit for Ms. Francis. 1) Seronegative Erosive Rheumatoid Arthritis. She is maintained on Humira 40 mg every 14 days with good tolerance breakthrough 24 to 48 hours before her next dose. She feels worse than her last visit with more active joint involvement in her hands. Her CDAI was 38.5 (previously 30.5, 38) with 13 tender and 15 swollen joints, consistent with high disease activity. I will change Humira to every 7 days. I asked her to call [facility] to inform them of this change. I submitted a new order. Due to her fatty liver, conventional DMARDs, such as sulfasalazine, methotrexate, leflunomide, and hydroxychloroquine are contra-indicated due to monitoring and higher risk potential adverse events. 2) Long Term Use of Immunosuppressants. The patient remains on immunomodulatory medications (Humira) and requires frequent toxicity monitoring by blood work. 3) Bilateral Patellofemoral Arthritis. This was no long an active issue. I had referred her to physical therapy, which helped. 4) Bilateral Knee Osteoarthritis. I referred her to physical therapy which helped. 5) Bilateral Hand Osteoarthritis. I recommended ball squeezing and holding until hand fatigue then alternating to other hand exercises 10 times twice daily. 6) Chronic Back Pain. I had referred her to physical therapy which helped, but she is now complaining of symptoms suggestive for spinal stenosis with neurogenic claudication.  She reports unable standing upright due to low back pain and limitation in walking distances due to low back pain. I referred her to Dr. Jackie Gee. The patient voiced understanding of the aforementioned assessment and plan. Summary of plan was provided in the After Visit Summary patient instructions.      TODAY'S ORDERS    Orders Placed This Encounter    REFERRAL TO ORTHOPEDICS    adalimumab (HUMIRA,CF, PEN) 40 mg/0.4 mL pnkt     Future Appointments   Date Time Provider Dionisio Reny   10/24/2019 10:20 AM Jagdish Gabriel MD [place], MD, [address] [place] [address]    Adult Rheumatology   Rheumatology Ultrasound Certified  [id number]  [facility], [address] [place] [address]   Phone [phone]  Fax [phone]

## 2019-07-23 NOTE — LETTER
7/23/19 Patient: Viviane Daley YOB: 1945 Date of Visit: 7/23/2019 Jacqueline Madsen MD 
[address] [place] [address] [id number] VIA Facsimile: [phone] Dear Jacqueline Madsen MD, Thank you for referring Ms. Noelle Francis to [address] [place] [address] for evaluation. My notes for this consultation are attached. If you have questions, please do not hesitate to call me. I look forward to following your patient along with you.  
 
 
Sincerely, 
 
Shelly Jameson MD

## 2019-07-25 ENCOUNTER — DOCUMENTATION ONLY (OUTPATIENT)
Dept: RHEUMATOLOGY | Age: 74
End: 2019-07-25

## 2019-08-27 DIAGNOSIS — M06.09 SERONEGATIVE RHEUMATOID ARTHRITIS OF MULTIPLE SITES (HCC): ICD-10-CM

## 2019-10-24 ENCOUNTER — OFFICE VISIT (OUTPATIENT)
Dept: RHEUMATOLOGY | Age: 74
End: 2019-10-24

## 2019-10-24 VITALS
TEMPERATURE: 98.4 F | RESPIRATION RATE: 18 BRPM | DIASTOLIC BLOOD PRESSURE: 91 MMHG | WEIGHT: 224 LBS | HEIGHT: 62 IN | BODY MASS INDEX: 41.22 KG/M2 | HEART RATE: 79 BPM | SYSTOLIC BLOOD PRESSURE: 175 MMHG

## 2019-10-24 DIAGNOSIS — M06.09 SERONEGATIVE RHEUMATOID ARTHRITIS OF MULTIPLE SITES (HCC): Primary | ICD-10-CM

## 2019-10-24 DIAGNOSIS — M48.062 SPINAL STENOSIS OF LUMBAR REGION WITH NEUROGENIC CLAUDICATION: ICD-10-CM

## 2019-10-24 DIAGNOSIS — Z79.60 LONG-TERM USE OF IMMUNOSUPPRESSANT MEDICATION: ICD-10-CM

## 2019-10-24 RX ORDER — FOLIC ACID 1 MG/1
1 TABLET ORAL DAILY
Qty: 90 TAB | Refills: 0 | Status: SHIPPED | OUTPATIENT
Start: 2019-10-24 | End: 2020-01-20 | Stop reason: SDUPTHER

## 2019-10-24 RX ORDER — LOPERAMIDE HYDROCHLORIDE 2 MG/1
CAPSULE ORAL AS NEEDED
COMMUNITY
End: 2020-12-09

## 2019-10-24 RX ORDER — MONTELUKAST SODIUM 4 MG/1
1 TABLET, CHEWABLE ORAL 2 TIMES DAILY
COMMUNITY
End: 2020-12-09

## 2019-10-24 RX ORDER — METHOTREXATE 2.5 MG/1
15 TABLET ORAL
Qty: 72 TAB | Refills: 0 | Status: SHIPPED | OUTPATIENT
Start: 2019-10-30 | End: 2019-12-05 | Stop reason: SDUPTHER

## 2019-10-24 NOTE — PROGRESS NOTES
Chief Complaint   Patient presents with    Arthritis     1. Have you been to the ER, urgent care clinic since your last visit? Hospitalized since your last visit? No    2. Have you seen or consulted any other health care providers outside of the [address] [place] [address] since your last visit? Include any pap smears or colon screening. Yes, Orthopedic doc

## 2019-10-24 NOTE — LETTER
10/24/19 Patient: Jonatan aDvidson YOB: 1945 Date of Visit: 10/24/2019 Laure Mcguire MD 
[address] [place] [address] [phone] VIA Facsimile: [phone] Dear Laure Mcguire MD, Thank you for referring Ms. Noelle Francis to [address] [place] [address] for evaluation. My notes for this consultation are attached. If you have questions, please do not hesitate to call me. I look forward to following your patient along with you.  
 
 
Sincerely, 
 
Pete Barrow MD

## 2019-10-24 NOTE — PATIENT INSTRUCTIONS
METHOTREXATE Methotrexate is a weekly regimen that is supplemented with daily folic acid to help counteract potential side effects. Potential Adverse Affects 
 
- Nausea - Vomiting - Upset stomach 
- Oral ulcers 
- Hair thinning - Infection - Liver function abnormalities - Blood count abnormalities - Rarely pneumonitis Medication Monitoring You will need routine blood counts and kidney and liver testing as a measure of monitoring for long term use of immunosuppressants. Things You Should Do You should avoid ill contacts You should get annual influenza vaccines and the pneumonia vaccines. You should apply SPF 50 sunscreen when out in the sun. You should avoid alcohol as it may hasten hepatotoxicity. You should avoid pregnancy due to risk for birth defects. You should contact me if you are sick. You should hold methotrexate if you are sick and resume after your sickness resolves. If you have any problems or side effects with this medication, please contact me immediately to inform me. Plan I prescribed methotrexate 15 mg (6 tablets) every WEDNESDAY at bedtime with DAILY folic acid 1 mg. If the folic acid 1 mg is not covered, then you may take two tablets of the over the counter [employer] folic acid 377 mcg (total of 800 mcg daily). Methotrexate may take 6 to 12 weeks to be effective. ASK YOUR PCP to check liver function tests and CBCD every 4 weeks and to send to me

## 2019-10-24 NOTE — PROGRESS NOTES
REASON FOR VISIT    This is a follow-up visit for Ms. Francis for     ICD-10-CM   1. Seronegative rheumatoid arthritis of multiple sites ([facility] M06.09     Inflammatory arthritis phenotype includes:  Anti-CCP positive: no  Rheumatoid factor positive: no  Erosive disease: yes   Extra-articular manifestations include: none    Immunosuppression Screening (1/21/2019): Quantiferon TB: negative  PPD:  Not performed  Hepatitis B: negative   Hepatitis C: negative      Therapy History includes:  Current DMARD therapy include: Humira 40 mg every Wednesday (7/23/2019 to present)  Prior DMARD therapy include: hydroxychloroquine 400 mg daily (1/2016 to 12/2017), hydroxychloroquine 600 mg daily (12/2017 to 1/21/2019; per PCP), Humira 40 mg every 14 days (2/2019 to 7/23/2019)  Discontinued DMARDs because of inefficacy: hydroxychloroquine   Discontinued DMARDs because of side effects: None  Contra-Indicated DMARDs because of fatty liver/hepatitis: hydroxychloroquine, sulfasalazine, methotrexate, leflunomide    Immunization History   Administered Date(s) Administered    Influenza Vaccine 10/25/2015, 10/31/2016    Zoster Vaccine, Live 11/25/2013     Patient Active Problem List   Diagnosis Code    Unspecified sinusitis (chronic) J32.9    Osteopenia M85.80    Seronegative rheumatoid arthritis of multiple sites ([facility] M06.09    Patellofemoral arthralgia of both knees M22.2X1, M22.2X2    Primary osteoarthritis of both knees M17.0    Primary osteoarthritis of both hands M19.041, M19.042    Severe obesity ([facility] E66.01    Long-term use of immunosuppressant medication Z79.899     Meka Toledo    Ms. Mary Anne Coles returns for a follow-up. On her last visit, I changed Humira 40 mg from every 14 days to every 7 days due to breakthrough. I also referred her to orthopedics, Dr. Perla Mahajan due to chronic back pain.  I reviewed hi office notes from 8/09/2019, who recommended conservative treatment and referred her to physical therapy which has helped. She feels better with the every 7 day dosing but has developed hot flashes lasting 15-20 minutes a day or two after her dose which is not limiting. Today, she feels much better. She denies pain, swelling, or stiffness in her hands or wrists. She can make a fist normally. She denies fever, weight loss, blurred vision, vision loss, oral ulcers, ankle swelling, dry cough, dyspnea, nausea, vomiting, dysphagia, abdominal pain, black or bloody stool, fall since last visit, rash, easy bruising and increased thirst.    Ms. Yared Fischer has continued her medications for arthritis and reports good tolerance without significant side effects. Last toxicity monitoring by blood work was done on 1/21/2019 and did not reveal any significant adverse effects, . Most recent inflammatory markers from 1/21/2019 revealed a ESR 7 mm/hr (previously 11, 4 mm/hr) and CRP 1.7 mg/L (previously 4.0, 1.6 mg/L). The patient has not had any interval hospital admissions, infections, or surgeries. REVIEW OF SYSTEMS    A comprehensive review of systems was performed and pertinent results are documented in the HPI, review of systems is otherwise non-contributory. PAST MEDICAL HISTORY    She has a past medical history of Asthma, Chronic sinusitis, Fibromyalgia, GERD (gastroesophageal reflux disease), Hypertension, Lower GI bleed (2014), Multiple allergies, Obstructive sleep apnea on CPAP, and Osteopenia. FAMILY HISTORY    Her family history includes Coronary Artery Disease in her father; Diabetes in her mother. SOCIAL HISTORY    She reports that she has never smoked. She has never used smokeless tobacco. She reports that she does not drink alcohol or use drugs. MEDICATIONS    Current Outpatient Medications   Medication Sig Dispense Refill    loperamide (IMODIUM) 2 mg capsule Take  by mouth as needed for Diarrhea.       colestipol (COLESTID) 1 gram tablet Take 1 g by mouth two (2) times a day.  [START ON 10/30/2019] methotrexate (RHEUMATREX) 2.5 mg tablet Take 6 Tabs by mouth every Wednesday. 72 Tab 0    folic acid (FOLVITE) 1 mg tablet Take 1 Tab by mouth daily. 90 Tab 0    adalimumab (HUMIRA,CF, PEN) 40 mg/0.4 mL pnkt 40 mg by SubCUTAneous route every seven (7) days. Indications: rheumatoid arthritis 2 Kit 11    olopatadine (PATANASE) 0.6 % spry two (2) times a day.  atorvastatin (LIPITOR) 10 mg tablet Take  by mouth daily.  losartan (COZAAR) 100 mg tablet Take 100 mg by mouth daily.  calcium-cholecalciferol, D3, (CALTRATE 600+D) tablet Take 1 Tab by mouth two (2) times a day.  alendronate (FOSAMAX) 70 mg tablet 70 mg once weekly. Please review proper mode of taking 4 Tab 11    magnesium oxide (MAG-OX) 400 mg tablet Take 400 mg by mouth daily.  Cholecalciferol, Vitamin D3, 1,000 unit cap Take 1,000 Units by mouth two (2) times a day.  DULERA 200-5 mcg/actuation HFA inhaler Take 2 Puffs by inhalation two (2) times a day.  glucosamine-chondroit-vit c-mn (GLUCOSAMINE 1500 COMPLEX) 500-400 mg capsule Take 2 Caps by mouth nightly.  montelukast (SINGULAIR) 10 mg tablet Take 10 mg by mouth daily.  esomeprazole (NEXIUM) 40 mg capsule Take  by mouth daily.  cetirizine (ZYRTEC) 10 mg tablet Take  by mouth daily. ALLERGIES    Allergies   Allergen Reactions    Cottonseed Cough    Lamisil [Terbinafine] Rash    Malt Extract Cough and Other (comments)     Asthma         PHYSICAL EXAMINATION    Visit Vitals  BP (!) 175/91   Pulse 79   Temp 98.4 °F (36.9 °C)   Resp 18   Ht 5' 2\" (1.575 m)   Wt 224 lb (101.6 kg)   BMI 40.97 kg/m²     Body mass index is 40.97 kg/m². General: Patient is alert, oriented x 3, not in acute distress    HEENT:   Sclerae are not injected and appear moist.  There is no alopecia. Neck is supple     Cardiovascular:  Heart is regular rate and rhythm, no murmurs.     Chest:  Lungs are clear to auscultation bilaterally. No rhonchi, wheezes, or crackles. Extremities:  Free of clubbing, cyanosis, edema    Neurological exam:  Muscle strength is full in upper and lower extremities     Skin exam:    Psoriasis:     no  Nail Pitting:     no  Onycholysis:     no  Palmoplantar pustulosis:   no  Acne fulminans:    no  Acne conglobata:    no  Hidradenitis Suppurativa:   no  Dissecting cellulitis of the scalp:  no  Pilonidal sinus:    no  Erythema nodosum:    no  Non-Scarring Alopecia:  no  Discoid Lupus:   no  Subacute Cutaneous Lupus:   no  Heliotrope Rash:   no  Upper Arm Erythema:   no  Shawl Sign:    no  V-sign:     no  Holster sign:    no  Gottron's papules:   no  Gottron's sign:    no  Calcinosis:    no  Raynaud's Phenomenon:  no  's Hands:   no  Periungual erythema:   no  Abnormal Nailfold Capillaries: no  Livedo Reticularis:   no  Scalp Erythema:   no  Rheumatoid Nodules:   no    Musculoskeletal:  A comprehensive musculoskeletal exam was performed for all joints of each upper and lower extremity and assessed for swelling, tenderness and range of motion.       Bilateral Armando and Heberden nodes  Bilateral knee crepitus without effusion with patellar laxity and crepitus  Left ankle tenderness with swelling (RESOLVED)    Z-Deformities:   no  Novelty Neck Deformities:  no  Boutonierre's Deformities:  no  Ulnar Deviation:   yes  MCP Subluxation:  no    Joint Count 10/24/2019 7/23/2019 4/23/2019 1/21/2019   Patient pain (0-100) 10 5 5 10   MHAQ 0.5 0.5 0.25 0.5   Left wrist- Tender 1 1 - -   Left wrist- Swollen 1 0 - -   Left 1st MCP - Tender 1 1 1 1   Left 1st MCP - Swollen 1 1 1 1   Left 2nd MCP - Tender 0 1 1 1   Left 2nd MCP - Swollen 1 1 1 1   Left 3rd MCP - Tender 1 1 1 1   Left 3rd MCP - Swollen 1 1 1 1   Left 4th MCP - Tender - 1 - -   Left 4th MCP - Swollen - 0 - -   Left 5th MCP - Tender - 1 - 1   Left 5th MCP - Swollen - 1 - 0   Left 2nd PIP - Tender - 1 1 1   Left 2nd PIP - Swollen - 1 0 1   Left 3rd PIP - Tender - 1 1 1   Left 3rd PIP - Swollen - 1 1 1   Left 4th PIP - Tender - 1 - 1   Left 4th PIP - Swollen - 1 - -   Left 5th PIP - Tender - 1 - -   Left 5th PIP - Swollen - 0 - -   Right elbow - Tender - - - 1   Right elbow - Swollen - - - 0   Right wrist- Tender 1 0 1 -   Right wrist- Swollen 0 1 1 -   Right 1st MCP - Tender 1 0 1 1   Right 1st MCP - Swollen 1 1 1 1   Right 2nd MCP - Tender - 0 1 1   Right 2nd MCP - Swollen - 1 0 1   Right 3rd MCP - Tender 1 0 1 1   Right 3rd MCP - Swollen 1 1 1 1   Right 4th MCP - Tender 1 - 1 1   Right 4th MCP - Swollen 0 - 1 1   Right 5th MCP - Tender - 0 1 1   Right 5th MCP - Swollen - 1 1 1   Right 2nd PIP - Tender 1 1 1 1   Right 2nd PIP - Swollen 1 1 1 1   Right 3rd PIP - Tender 1 1 1 1   Right 3rd PIP - Swollen 1 1 1 1   Right 4th PIP - Tender 1 1 1 1   Right 4th PIP - Swollen 1 1 1 1   Right 5th PIP - Tender - - - 1   Right 5th PIP - Swollen - - - 1   Tender Joint Count (Total) 10 13 14 17   Swollen Joint Count (Total) 9 15 12 14   Physician Assessment (0-10) 3 5 4 5   Patient Assessment (0-10) 1 5.5 0.5 2   CDAI Total (calculated) 23 38.5 30.5 38     DATA REVIEW    Laboratory     Recent laboratory results were reviewed, summarized, and discussed with the patient. Imaging    Musculoskeletal Ultrasound    None    Radiographs    Bilateral Hand 1/21/2019: RIGHT: osteopenia. No acute fracture or dislocation. Radiocarpal and intercarpal joints are age-appropriate. No erosion of intercarpal or CMC joints. First MCP joint space narrowing is increased. There are small marginal osteophytes. No erosion. Anterior subluxation of the third MCP joint and joint space narrowing are increased. There are are increased subtle erosions of the third metacarpal head. Fifth MCP joint erosion is unchanged. Third PIP joint space narrowing is mild and unchanged. The DIP joints are within normal limits. LEFT: osteopenia. No acute fracture or dislocation.  Mild anterior subluxation of the third MCP joint is increased. Radiocarpal and intercarpal joints are within normal limits. First CMC joint osteoarthritis is mild and new. Third MCP joint space narrowing and subtle erosions are increased. First MCP joint osteoarthritis is mild and new. IP joints are within normal limits. No periosteal reaction or chondrocalcinosis. Bilateral Foot 1/21/2019: RIGHT: osteopenia. No acute fracture or dislocation. Intertarsal and TMT joints are within normal limits. Hallux valgus angulation measures 45 degrees. Mild first MTP joint osteoarthritis. MTS joint subluxation and arthritis are partially imaged. Remaining MTP joints are within normal limits. IP joints are difficult to visualize given the toe positioning. There are soft tissue calcifications adjacent to the distal tibia. LEFT: osteopenia. No acute fracture or dislocation. Moderate plantar calcaneal spur. Mild talonavicular joint osteoarthritis. Possible third TMT joint osteoarthritis. Hallux valgus angulation measures 40 degrees. Mild first MTP joint osteoarthritis. MTS joint subluxation and arthritis are partially imaged. Mild third PIP and DIP joint osteoarthritis. No evidence of IP joint erosion. Soft tissue calcifications are adjacent to the distal tibia. No periosteal reaction or chondrocalcinosis. Bilateral Hand 11/25/2015: RIGHT: no acute fracture or dislocation. Alignment is anatomic. Severe joint space narrowing is seen at the third MCP joint. Small erosions are suspected in the head of the third metacarpal. Mild joint space narrowing is seen in the first MCP joint. Generalized osteopenia is noted. Soft tissue swelling surrounds the third MCP joint. LEFT: no acute fracture or dislocation. Alignment is anatomic. Severe joint space is seen at the third  MCP joint mild joint space. Mild joint space narrowing is seen at the first MCP joint. No erosions are seen. Generalized osteopenia is noted.  There is the suggestion of mild soft tissue swelling surrounding the third MCP joint. CT Imaging    CT Chest without contrast 1/16/2017: The thoracic inlet is unremarkable. No mediastinal nor hilar masses nor adenopathy. No thoracic aortic aneurysm. The central airways are patent. Minimal hypoventilation is appreciated panel placed. The lungs are otherwise clear. There is diffuse low attenuation within the liver parenchyma. The remaining visualized upper abdominal viscera are unremarkable. There are no aggressive appearing osseous lesions. Degenerative changes within the mid thoracic spine. MR Imaging    None    DXA     DXA 11/01/2016: (excluded L1, right hip, distal radius) lumbar spine L1-L3 T score 1.5 (BMD 1.183 g/cm2), left femoral neck T score: -0.5 (0.880 g/cm2). FRAX score 17 % probability in 10 years for major osteoporotic fracture and 3.7 % 10 year probability of hip fracture. ASSESSMENT AND PLAN    This is a follow-up visit for Ms. Francis. 1) Seronegative Erosive Rheumatoid Arthritis. She is maintained on Humira 40 mg every Wedneday with good tolerance and feels better than every 14 days. She denies active symptoms but her examination shows persistent disease. Her CDAI was 23 (previously 38.5, 30.5, 38) with 10 tender and 9 swollen joints, consistent with high disease activity. She has improved but not enough. She has fatty liver, so I have avoided conventional DMARDs previously, but I think we should try doing so with close monitoring of her liver function tests every 4 weeks. We discussed initiation of DMARD therapy with methotrexate, which is first line therapy in Rheumatoid Arthritis. It is a weekly regimen that is supplemented with daily folic acid to help counteract potential side effects. I discussed with the patient the potential adverse effects, which include: nausea, vomiting, dyspepsia, oral ulcers, hair thinning, infection, liver function abnormalities, blood count abnormalities, and rarely pneumonitis or melanoma.  The patient understood these possible adverse effects. I informed the patient of the need for routine CBC and CMP as a measure for long term use of immunosuppressants. I also instructed the patient to avoid ill contacts and the needs for annual influenza vaccines and the pneumonia vaccines. I asked the patient to apply SPF 50 sunscreen when out in the sun. The patient was also instructed to avoid alcohol as it may hasten hepatotoxicity. In childbearing patients, pregnancy is contra-indicated while on methotrexate due to risk for birth defects and early termination. I prescribed methotrexate 15 mg every Wednesday with daily folic acid 1 mg. I informed the patient that methotrexate may take 6 to 12 weeks to be effective. Patient was informed to contact me if they develop any adverse reaction or infection. I asked her to ask her PCP to check her liver function tests and CBCD every 4 weeks and to seed me the results. She lives in [address]. Due to her fatty liver, conventional DMARDs, such as sulfasalazine, methotrexate, leflunomide, and hydroxychloroquine are relatively contra-indicated. 2) Long Term Use of Immunosuppressants. The patient remains on immunomodulatory medications (Humira) and requires frequent toxicity monitoring by blood work. 3) Bilateral Patellofemoral Arthritis. This was no long an active issue. I had referred her to physical therapy, which helped. 4) Bilateral Knee Osteoarthritis. I had referred her to physical therapy which helped. 5) Bilateral Hand Osteoarthritis. I recommended ball squeezing and holding until hand fatigue then alternating to other hand exercises 10 times twice daily. 6) Chronic Back Pain. She has done physical therapy which helped, but still reports difficulty standing upright due to low back pain and limitation in walking distances due to low back pain. She now follows with Dr. Bong Hernandez.      The patient voiced understanding of the aforementioned assessment and plan. Summary of plan was provided in the After Visit Summary patient instructions.      TODAY'S ORDERS    Orders Placed This Encounter    methotrexate (RHEUMATREX) 2.5 mg tablet    folic acid (FOLVITE) 1 mg tablet     Future Appointments   Date Time Provider Dionisio Oglesby   1/24/2020 10:20 AM MD Maikel Choi MD, [address] [facility]    Adult Rheumatology   Rheumatology Ultrasound Certified  [facility]  A Part of [facility], [address] [place] [address]   Phone [phone]  Fax [phone]

## 2019-12-05 ENCOUNTER — PATIENT MESSAGE (OUTPATIENT)
Dept: RHEUMATOLOGY | Age: 74
End: 2019-12-05

## 2019-12-05 DIAGNOSIS — M06.09 SERONEGATIVE RHEUMATOID ARTHRITIS OF MULTIPLE SITES (HCC): ICD-10-CM

## 2019-12-05 RX ORDER — METHOTREXATE 2.5 MG/1
20 TABLET ORAL
Qty: 96 TAB | Refills: 0 | Status: SHIPPED | OUTPATIENT
Start: 2019-12-11 | End: 2020-01-24 | Stop reason: ALTCHOICE

## 2019-12-20 NOTE — PROGRESS NOTES
Received fax from [facility] stating  Humira Pen 40 mg/0.4ml has been approved from 7/25/2019-7/24/2020, Ref# [id number].

## 2020-01-20 DIAGNOSIS — M06.09 SERONEGATIVE RHEUMATOID ARTHRITIS OF MULTIPLE SITES (HCC): ICD-10-CM

## 2020-01-20 RX ORDER — FOLIC ACID 1 MG/1
1 TABLET ORAL DAILY
Qty: 90 TAB | Refills: 0 | Status: SHIPPED | OUTPATIENT
Start: 2020-01-20 | End: 2020-04-24 | Stop reason: SDUPTHER

## 2020-01-24 ENCOUNTER — OFFICE VISIT (OUTPATIENT)
Dept: RHEUMATOLOGY | Age: 75
End: 2020-01-24

## 2020-01-24 VITALS
SYSTOLIC BLOOD PRESSURE: 155 MMHG | DIASTOLIC BLOOD PRESSURE: 84 MMHG | WEIGHT: 224 LBS | BODY MASS INDEX: 41.22 KG/M2 | HEART RATE: 76 BPM | TEMPERATURE: 98.2 F | HEIGHT: 62 IN | RESPIRATION RATE: 18 BRPM

## 2020-01-24 DIAGNOSIS — M06.09 SERONEGATIVE RHEUMATOID ARTHRITIS OF MULTIPLE SITES (HCC): Primary | ICD-10-CM

## 2020-01-24 DIAGNOSIS — Z79.60 LONG-TERM USE OF IMMUNOSUPPRESSANT MEDICATION: ICD-10-CM

## 2020-01-24 DIAGNOSIS — M48.062 SPINAL STENOSIS OF LUMBAR REGION WITH NEUROGENIC CLAUDICATION: ICD-10-CM

## 2020-01-24 RX ORDER — SYRINGE-NEEDLE,INSULIN,0.5 ML 30 GX5/16"
1 SYRINGE, EMPTY DISPOSABLE MISCELLANEOUS
Qty: 12 SYRINGE | Refills: 3 | Status: SHIPPED | OUTPATIENT
Start: 2020-01-25 | End: 2020-04-12

## 2020-01-24 RX ORDER — METHOTREXATE 25 MG/ML
25 INJECTION, SOLUTION INTRA-ARTERIAL; INTRAMUSCULAR; INTRAVENOUS
Qty: 12 ML | Refills: 0 | Status: SHIPPED | OUTPATIENT
Start: 2020-01-29 | End: 2020-04-10 | Stop reason: SDUPTHER

## 2020-01-24 NOTE — LETTER
1/24/20 Patient: Nancy Peterson YOB: 1945 Date of Visit: 1/24/2020 Sharla Hooper MD 
[address] [place] [address] [id number] VIA Facsimile: [phone] Dear Sharla Hooper MD, Thank you for referring Ms. Noelle Francis to [address] [place] [address] for evaluation. My notes for this consultation are attached. If you have questions, please do not hesitate to call me. I look forward to following your patient along with you.  
 
 
Sincerely, 
 
Leslie Wolf MD

## 2020-01-24 NOTE — PROGRESS NOTES
Chief Complaint   Patient presents with    Arthritis     1. Have you been to the ER, urgent care clinic since your last visit? Hospitalized since your last visit? No    2. Have you seen or consulted any other health care providers outside of the [address] [place] [address] since your last visit? Include any pap smears or colon screening.  Yes PCP and Orthopedic

## 2020-01-24 NOTE — PATIENT INSTRUCTIONS
I will replace oral methotrexate for subcutaneous injections of methotrexate Please draw up 1 mL of methotrexate in the syringe. Then, place an ice cube or ice pack on your injection site (thigh or abdomen) for about one to two minutes to numb your skin - if you are concerned for the pinch sensation from the small needle Then pinch your skin to lift it up (tent it) and insert the needle under the skin and inject the methotrexate. Continue daily folic acid Stop methotrexate tablets

## 2020-01-24 NOTE — PROGRESS NOTES
REASON FOR VISIT    This is a follow-up visit for Ms. Francis for     ICD-10-CM   1. Seronegative rheumatoid arthritis of multiple sites ([facility] M06.09     Inflammatory arthritis phenotype includes:  Anti-CCP positive: no  Rheumatoid factor positive: no  Erosive disease: yes   Extra-articular manifestations include: none    Immunosuppression Screening (1/21/2019): Quantiferon TB: negative  PPD:  Not performed  Hepatitis B: negative   Hepatitis C: negative      Therapy History includes:  Current DMARD therapy include: methotrexate 20 mg every Wednesday (10/30/2019 to present), Humira 40 mg every Wednesday (7/23/2019 to present)  Prior DMARD therapy include: hydroxychloroquine 400 mg daily (1/2016 to 12/2017), hydroxychloroquine 600 mg daily (12/2017 to 1/21/2019; per PCP), Humira 40 mg every 14 days (2/2019 to 7/23/2019)  Discontinued DMARDs because of inefficacy: hydroxychloroquine   Discontinued DMARDs because of side effects: None  Contra-Indicated DMARDs because of fatty liver/hepatitis: hydroxychloroquine, sulfasalazine, methotrexate, leflunomide    Immunization History   Administered Date(s) Administered    Influenza Vaccine 10/25/2015, 10/31/2016    Zoster Vaccine, Live 11/25/2013     Patient Active Problem List   Diagnosis Code    Unspecified sinusitis (chronic) J32.9    Osteopenia M85.80    Seronegative rheumatoid arthritis of multiple sites ([facility] M06.09    Patellofemoral arthralgia of both knees M22.2X1, M22.2X2    Primary osteoarthritis of both knees M17.0    Primary osteoarthritis of both hands M19.041, M19.042    Severe obesity ([facility] E66.01    Long-term use of immunosuppressant medication Z79.899     [facility]    Ms. Eden Marroquin returns for a follow-up. On her last visit, I prescribed methotrexate 15 mg every Wednesday with daily folic acid 1 mg and continued Humira 40 mg every Wednesday. I increased her methotrexate to 20 mg over Taxizu on 12/05/2019.  She has been tolerating methotrexate well. Today, she feels much better. She denies pain, swelling, or stiffness in her hands or wrists. She can make a fist normally. She received an epidural yesterday which helped her a lot. She denies fever, weight loss, blurred vision, vision loss, oral ulcers, ankle swelling, dry cough, dyspnea, nausea, vomiting, dysphagia, abdominal pain, black or bloody stool, fall since last visit, rash, easy bruising and increased thirst.    Ms. Loreli Severin has continued her medications for arthritis and reports good tolerance without significant side effects. Last toxicity monitoring by blood work was done on 10/21/2019 (outside labs reviewed) and did not reveal any significant adverse effects, except Hct 30%    Most recent inflammatory markers from 1/21/2019 revealed a ESR 7 mm/hr and CRP 1.7 mg/L. The patient has not had any interval hospital admissions, infections, or surgeries. REVIEW OF SYSTEMS    A comprehensive review of systems was performed and pertinent results are documented in the HPI, review of systems is otherwise non-contributory. PAST MEDICAL HISTORY    She has a past medical history of Asthma, Chronic sinusitis, Fibromyalgia, GERD (gastroesophageal reflux disease), Hypertension, Lower GI bleed (2014), Multiple allergies, Obstructive sleep apnea on CPAP, and Osteopenia. FAMILY HISTORY    Her family history includes Coronary Artery Disease in her father; Diabetes in her mother. SOCIAL HISTORY    She reports that she has never smoked. She has never used smokeless tobacco. She reports that she does not drink alcohol or use drugs. MEDICATIONS    Current Outpatient Medications   Medication Sig Dispense Refill    methylcellulose, laxative, powd Take  by mouth two (2) times a day.  [START ON 1/29/2020] methotrexate 25 mg/mL chemo injection 1 mL by SubCUTAneous route every Wednesday.  12 mL 0    [START ON 1/25/2020] Insulin Syringe-Needle U-100 1 mL 30 gauge x 5/16 syrg 1 Each by Does Not Apply route Every Saturday for 12 doses. To be used for methotrexate solution 12 Syringe 3    folic acid (FOLVITE) 1 mg tablet Take 1 Tab by mouth daily. 90 Tab 0    loperamide (IMODIUM) 2 mg capsule Take  by mouth as needed for Diarrhea.  colestipol (COLESTID) 1 gram tablet Take 1 g by mouth two (2) times a day.  adalimumab (HUMIRA,CF, PEN) 40 mg/0.4 mL pnkt 40 mg by SubCUTAneous route every seven (7) days. Indications: rheumatoid arthritis (Patient taking differently: 40 mg by SubCUTAneous route every Wednesday. Indications: rheumatoid arthritis) 2 Kit 11    olopatadine (PATANASE) 0.6 % spry two (2) times a day.  atorvastatin (LIPITOR) 10 mg tablet Take  by mouth daily.  losartan (COZAAR) 100 mg tablet Take 100 mg by mouth daily.  calcium-cholecalciferol, D3, (CALTRATE 600+D) tablet Take 1 Tab by mouth two (2) times a day.  alendronate (FOSAMAX) 70 mg tablet 70 mg once weekly. Please review proper mode of taking 4 Tab 11    magnesium oxide (MAG-OX) 400 mg tablet Take 400 mg by mouth daily.  Cholecalciferol, Vitamin D3, 1,000 unit cap Take 1,000 Units by mouth two (2) times a day.  DULERA 200-5 mcg/actuation HFA inhaler Take 2 Puffs by inhalation two (2) times a day.  glucosamine-chondroit-vit c-mn (GLUCOSAMINE 1500 COMPLEX) 500-400 mg capsule Take 2 Caps by mouth nightly.  montelukast (SINGULAIR) 10 mg tablet Take 10 mg by mouth daily.  esomeprazole (NEXIUM) 40 mg capsule Take  by mouth daily.  cetirizine (ZYRTEC) 10 mg tablet Take  by mouth daily. ALLERGIES    Allergies   Allergen Reactions    Cottonseed Cough    Lamisil [Terbinafine] Rash    Malt Extract Cough and Other (comments)     Asthma         PHYSICAL EXAMINATION    Visit Vitals  /84   Pulse 76   Temp 98.2 °F (36.8 °C)   Resp 18   Ht 5' 2\" (1.575 m)   Wt 224 lb (101.6 kg)   BMI 40.97 kg/m²     Body mass index is 40.97 kg/m².     General: Patient is alert, oriented x 3, not in acute distress    HEENT:   Sclerae are not injected and appear moist.  There is no alopecia. Neck is supple     Cardiovascular:  Heart is regular rate and rhythm, no murmurs. Chest:  Lungs are clear to auscultation bilaterally. No rhonchi, wheezes, or crackles. Extremities:  Free of clubbing, cyanosis, edema    Neurological exam:  Muscle strength is full in upper and lower extremities     Skin exam:    Psoriasis:     no  Nail Pitting:     no  Onycholysis:     no  Palmoplantar pustulosis:   no  Acne fulminans:    no  Acne conglobata:    no  Hidradenitis Suppurativa:   no  Dissecting cellulitis of the scalp:  no  Pilonidal sinus:    no  Erythema nodosum:    no  Non-Scarring Alopecia:  no  Discoid Lupus:   no  Subacute Cutaneous Lupus:   no  Heliotrope Rash:   no  Upper Arm Erythema:   no  Shawl Sign:    no  V-sign:     no  Holster sign:    no  Gottron's papules:   no  Gottron's sign:    no  Calcinosis:    no  Raynaud's Phenomenon:  no  's Hands:   no  Periungual erythema:   no  Abnormal Nailfold Capillaries: no  Livedo Reticularis:   no  Scalp Erythema:   no  Rheumatoid Nodules:   no    Musculoskeletal:  A comprehensive musculoskeletal exam was performed for all joints of each upper and lower extremity and assessed for swelling, tenderness and range of motion.       Bilateral Armando and [place] nodes  Bilateral knee crepitus without effusion with patellar laxity and crepitus    Z-Deformities:   no  [place] Neck Deformities:  no  Boutonierre's Deformities:  no  Ulnar Deviation:   yes  MCP Subluxation:  no    Joint Count 1/24/2020 10/24/2019 7/23/2019 4/23/2019 1/21/2019   Patient pain (0-100) 5 10 5 5 10   MHAQ 0.25 0.5 0.5 0.25 0.5   Left wrist- Tender 1 1 1 - -   Left wrist- Swollen 0 1 0 - -   Left 1st MCP - Tender - 1 1 1 1   Left 1st MCP - Swollen - 1 1 1 1   Left 2nd MCP - Tender 1 0 1 1 1   Left 2nd MCP - Swollen 1 1 1 1 1   Left 3rd MCP - Tender 1 1 1 1 1   Left 3rd MCP - Swollen - 1 1 1 1   Left 4th MCP - Tender - - 1 - -   Left 4th MCP - Swollen - - 0 - -   Left 5th MCP - Tender - - 1 - 1   Left 5th MCP - Swollen - - 1 - 0   Left 2nd PIP - Tender - - 1 1 1   Left 2nd PIP - Swollen - - 1 0 1   Left 3rd PIP - Tender 1 - 1 1 1   Left 3rd PIP - Swollen 1 - 1 1 1   Left 4th PIP - Tender - - 1 - 1   Left 4th PIP - Swollen - - 1 - -   Left 5th PIP - Tender - - 1 - -   Left 5th PIP - Swollen - - 0 - -   Right elbow - Tender - - - - 1   Right elbow - Swollen - - - - 0   Right wrist- Tender - 1 0 1 -   Right wrist- Swollen - 0 1 1 -   Right 1st MCP - Tender 1 1 0 1 1   Right 1st MCP - Swollen 1 1 1 1 1   Right 2nd MCP - Tender - - 0 1 1   Right 2nd MCP - Swollen - - 1 0 1   Right 3rd MCP - Tender 1 1 0 1 1   Right 3rd MCP - Swollen 1 1 1 1 1   Right 4th MCP - Tender 1 1 - 1 1   Right 4th MCP - Swollen 1 0 - 1 1   Right 5th MCP - Tender 0 - 0 1 1   Right 5th MCP - Swollen 1 - 1 1 1   Right 2nd PIP - Tender 0 1 1 1 1   Right 2nd PIP - Swollen 1 1 1 1 1   Right 3rd PIP - Tender 1 1 1 1 1   Right 3rd PIP - Swollen 1 1 1 1 1   Right 4th PIP - Tender 1 1 1 1 1   Right 4th PIP - Swollen 1 1 1 1 1   Right 5th PIP - Tender - - - - 1   Right 5th PIP - Swollen - - - - 1   Tender Joint Count (Total) 9 10 13 14 17   Swollen Joint Count (Total) 9 9 15 12 14   Physician Assessment (0-10) 3 3 5 4 5   Patient Assessment (0-10) 0.5 1 5.5 0.5 2   CDAI Total (calculated) 21.5 23 38.5 30.5 38     DATA REVIEW    Laboratory     Recent laboratory results were reviewed, summarized, and discussed with the patient. Imaging    Musculoskeletal Ultrasound    None    Radiographs    Bilateral Hand 1/21/2019: RIGHT: osteopenia. No acute fracture or dislocation. Radiocarpal and intercarpal joints are age-appropriate. No erosion of intercarpal or CMC joints. First MCP joint space narrowing is increased. There are small marginal osteophytes. No erosion.  Anterior subluxation of the third MCP joint and joint space narrowing are increased. There are are increased subtle erosions of the third metacarpal head. Fifth MCP joint erosion is unchanged. Third PIP joint space narrowing is mild and unchanged. The DIP joints are within normal limits. LEFT: osteopenia. No acute fracture or dislocation. Mild anterior subluxation of the third MCP joint is increased. Radiocarpal and intercarpal joints are within normal limits. First CMC joint osteoarthritis is mild and new. Third MCP joint space narrowing and subtle erosions are increased. First MCP joint osteoarthritis is mild and new. IP joints are within normal limits. No periosteal reaction or chondrocalcinosis. Bilateral Foot 1/21/2019: RIGHT: osteopenia. No acute fracture or dislocation. Intertarsal and TMT joints are within normal limits. Hallux valgus angulation measures 45 degrees. Mild first MTP joint osteoarthritis. MTS joint subluxation and arthritis are partially imaged. Remaining MTP joints are within normal limits. IP joints are difficult to visualize given the toe positioning. There are soft tissue calcifications adjacent to the distal tibia. LEFT: osteopenia. No acute fracture or dislocation. Moderate plantar calcaneal spur. Mild talonavicular joint osteoarthritis. Possible third TMT joint osteoarthritis. Hallux valgus angulation measures 40 degrees. Mild first MTP joint osteoarthritis. MTS joint subluxation and arthritis are partially imaged. Mild third PIP and DIP joint osteoarthritis. No evidence of IP joint erosion. Soft tissue calcifications are adjacent to the distal tibia. No periosteal reaction or chondrocalcinosis. Bilateral Hand 11/25/2015: RIGHT: no acute fracture or dislocation. Alignment is anatomic. Severe joint space narrowing is seen at the third MCP joint. Small erosions are suspected in the head of the third metacarpal. Mild joint space narrowing is seen in the first MCP joint. Generalized osteopenia is noted.  Soft tissue swelling surrounds the third MCP joint. LEFT: no acute fracture or dislocation. Alignment is anatomic. Severe joint space is seen at the third  MCP joint mild joint space. Mild joint space narrowing is seen at the first MCP joint. No erosions are seen. Generalized osteopenia is noted. There is the suggestion of mild soft tissue swelling surrounding the third MCP joint. CT Imaging    CT Chest without contrast 1/16/2017: The thoracic inlet is unremarkable. No mediastinal nor hilar masses nor adenopathy. No thoracic aortic aneurysm. The central airways are patent. Minimal hypoventilation is appreciated panel placed. The lungs are otherwise clear. There is diffuse low attenuation within the liver parenchyma. The remaining visualized upper abdominal viscera are unremarkable. There are no aggressive appearing osseous lesions. Degenerative changes within the mid thoracic spine. MR Imaging    None    DXA     DXA 11/01/2016: (excluded L1, right hip, distal radius) lumbar spine L1-L3 T score 1.5 (BMD 1.183 g/cm2), left femoral neck T score: -0.5 (0.880 g/cm2). FRAX score 17 % probability in 10 years for major osteoporotic fracture and 3.7 % 10 year probability of hip fracture. ASSESSMENT AND PLAN    This is a follow-up visit for Ms. Francis. 1) Seronegative Erosive Rheumatoid Arthritis. She is maintained on methotrexate 20 mg every Wednesday and Humira 40 mg every Wedneday with good tolerance and feels better. Her CDAI was 21.5 (previously 23, 38.5, 30.5, 38) with 9 tender and 9 swollen joints, consistent with high disease activity. She has improved but not enough. She has fatty liver, so I have avoided conventional DMARDs previously, but I think we should try doing so with close monitoring of her liver function tests. I will change her oral methotrexate to subcutaneous dosing at 25 mg weekly as long as her liver function tests are stable.      Due to her fatty liver, conventional DMARDs, such as sulfasalazine, methotrexate, leflunomide, and hydroxychloroquine are relatively contra-indicated. 2) Long Term Use of Immunosuppressants. The patient remains on immunomodulatory medications (methotrexate, Humira) and requires frequent toxicity monitoring by blood work. 3) Bilateral Patellofemoral Arthritis. This was no long an active issue. I had referred her to physical therapy, which helped. 4) Bilateral Knee Osteoarthritis. I had referred her to physical therapy which helped. 5) Bilateral Hand Osteoarthritis. I recommended ball squeezing and holding until hand fatigue then alternating to other hand exercises 10 times twice daily. 6) Chronic Back Pain. She has done physical therapy which helped, but still reports difficulty standing upright due to low back pain and limitation in walking distances due to low back pain. She now follows with Dr. Mir Zaragoza who performed an epidural yesterday with great relief. The patient voiced understanding of the aforementioned assessment and plan. Summary of plan was provided in the After Visit Summary patient instructions.      TODAY'S ORDERS    Orders Placed This Encounter    QUANTIFERON-TB GOLD PLUS    CBC WITH AUTOMATED DIFF    METABOLIC PANEL, COMPREHENSIVE    C REACTIVE PROTEIN, QT    SED RATE (ESR)    CHRONIC HEPATITIS PANEL    METHOTREXATE POLYGLUTAMATES    methotrexate 25 mg/mL chemo injection    Insulin Syringe-Needle U-100 1 mL 30 gauge x 5/16 syrg     Future Appointments   Date Time Provider Dionisio Reny   4/24/2020 10:20 AM Vada Bumpers, MD [address], MD, [address] [place] [address] Rd    Adult Rheumatology   Rheumatology Ultrasound Certified  [facility]  A Part of [facility], [address] [place] [address]   Phone [phone]  Fax [phone]

## 2020-02-05 LAB
ALBUMIN SERPL-MCNC: 4.3 G/DL (ref 3.7–4.7)
ALBUMIN/GLOB SERPL: 1.9 {RATIO} (ref 1.2–2.2)
ALP SERPL-CCNC: 146 IU/L (ref 39–117)
ALT SERPL-CCNC: 25 IU/L (ref 0–32)
AST SERPL-CCNC: 16 IU/L (ref 0–40)
BASOPHILS # BLD AUTO: 0 X10E3/UL (ref 0–0.2)
BASOPHILS NFR BLD AUTO: 0 %
BILIRUB SERPL-MCNC: 0.4 MG/DL (ref 0–1.2)
BUN SERPL-MCNC: 18 MG/DL (ref 8–27)
BUN/CREAT SERPL: 27 (ref 12–28)
CALCIUM SERPL-MCNC: 10.4 MG/DL (ref 8.7–10.3)
CHLORIDE SERPL-SCNC: 102 MMOL/L (ref 96–106)
CO2 SERPL-SCNC: 21 MMOL/L (ref 20–29)
COMMENT, 144067: NORMAL
CREAT SERPL-MCNC: 0.67 MG/DL (ref 0.57–1)
CRP SERPL-MCNC: <1 MG/L (ref 0–10)
EOSINOPHIL # BLD AUTO: 0 X10E3/UL (ref 0–0.4)
EOSINOPHIL NFR BLD AUTO: 0 %
ERYTHROCYTE [DISTWIDTH] IN BLOOD BY AUTOMATED COUNT: 20.2 % (ref 11.7–15.4)
ERYTHROCYTE [SEDIMENTATION RATE] IN BLOOD BY WESTERGREN METHOD: 10 MM/HR (ref 0–40)
GAMMA INTERFERON BACKGROUND BLD IA-ACNC: 0.09 IU/ML
GLOBULIN SER CALC-MCNC: 2.3 G/DL (ref 1.5–4.5)
GLUCOSE SERPL-MCNC: 99 MG/DL (ref 65–99)
HBV CORE AB SERPL QL IA: NEGATIVE
HBV CORE IGM SERPL QL IA: NEGATIVE
HBV E AB SERPL QL IA: NEGATIVE
HBV E AG SERPL QL IA: NEGATIVE
HBV SURFACE AB SER QL: NON REACTIVE
HBV SURFACE AG SERPL QL IA: NEGATIVE
HCT VFR BLD AUTO: 32.9 % (ref 34–46.6)
HCV AB S/CO SERPL IA: <0.1 S/CO RATIO (ref 0–0.9)
HGB BLD-MCNC: 10 G/DL (ref 11.1–15.9)
IMM GRANULOCYTES # BLD AUTO: 0 X10E3/UL (ref 0–0.1)
IMM GRANULOCYTES NFR BLD AUTO: 0 %
LYMPHOCYTES # BLD AUTO: 0.8 X10E3/UL (ref 0.7–3.1)
LYMPHOCYTES NFR BLD AUTO: 9 %
M TB IFN-G BLD-IMP: NEGATIVE
M TB IFN-G CD4+ BCKGRND COR BLD-ACNC: 0.11 IU/ML
MCH RBC QN AUTO: 24.4 PG (ref 26.6–33)
MCHC RBC AUTO-ENTMCNC: 30.4 G/DL (ref 31.5–35.7)
MCV RBC AUTO: 80 FL (ref 79–97)
METHOTREXATE PG1: 69.91 NMOL/L RBC
METHOTREXATE PG2: 36.05 NMOL/L RBC
METHOTREXATE PG3: 61.13 NMOL/L RBC
METHOTREXATE PG4: 16.52 NMOL/L RBC
METHOTREXATE PG5: 3.32 NMOL/L RBC
METHOTREXATE-PG TOTAL: 186.93 NMOL/L RBC
MITOGEN IGNF BLD-ACNC: 2.21 IU/ML
MONOCYTES # BLD AUTO: 0.8 X10E3/UL (ref 0.1–0.9)
MONOCYTES NFR BLD AUTO: 10 %
MTXPGSRA INTERPRETATION: NORMAL
NEUTROPHILS # BLD AUTO: 7.2 X10E3/UL (ref 1.4–7)
NEUTROPHILS NFR BLD AUTO: 81 %
PLATELET # BLD AUTO: 338 X10E3/UL (ref 150–450)
POTASSIUM SERPL-SCNC: 4.9 MMOL/L (ref 3.5–5.2)
PROT SERPL-MCNC: 6.6 G/DL (ref 6–8.5)
QUANTIFERON INCUBATION, QF1T: NORMAL
QUANTIFERON TB2 AG: 0.13 IU/ML
RBC # BLD AUTO: 4.1 X10E6/UL (ref 3.77–5.28)
SERVICE CMNT-IMP: NORMAL
SODIUM SERPL-SCNC: 141 MMOL/L (ref 134–144)
WBC # BLD AUTO: 8.8 X10E3/UL (ref 3.4–10.8)

## 2020-02-05 NOTE — PROGRESS NOTES
The results were reviewed. Mild anemia with elevated ALK. Therapeutic methotrexate level 186. 93. All remaining labs are normal/negative.

## 2020-04-09 ENCOUNTER — PATIENT MESSAGE (OUTPATIENT)
Dept: RHEUMATOLOGY | Age: 75
End: 2020-04-09

## 2020-04-09 DIAGNOSIS — M06.09 SERONEGATIVE RHEUMATOID ARTHRITIS OF MULTIPLE SITES (HCC): ICD-10-CM

## 2020-04-10 RX ORDER — METHOTREXATE 25 MG/ML
25 INJECTION, SOLUTION INTRA-ARTERIAL; INTRAMUSCULAR; INTRAVENOUS
Qty: 12 ML | Refills: 0 | Status: SHIPPED | OUTPATIENT
Start: 2020-04-15 | End: 2020-06-19 | Stop reason: SDUPTHER

## 2020-04-10 NOTE — TELEPHONE ENCOUNTER
From: Lourdes Pinon  Sent: 4/9/2020 9:04 PM EDT  To: Alfredo Jovel MD  Subject: RE: Non-Urgent Medical Question    Yes, > I do need a refill on med and needles sent to [facility]. I will talk to your staff but not sure how a tele-visit. I don't have wifi. We do have a hot spot. Thank you.   Jennifer To

## 2020-04-22 RX ORDER — ASPIRIN 81 MG/1
TABLET ORAL DAILY
COMMUNITY

## 2020-04-22 NOTE — PROGRESS NOTES
Chief Complaint   Patient presents with    Arthritis     1. Have you been to the ER, urgent care clinic since your last visit? Hospitalized since your last visit? No    2. Have you seen or consulted any other health care providers outside of the [address] [place] [address] since your last visit? Include any pap smears or colon screening.  Yes, PCP

## 2020-04-23 NOTE — PROGRESS NOTES
REASON FOR VISIT    This is a follow-up visit for Ms. Francis for     ICD-10-CM   1. Seronegative rheumatoid arthritis of multiple sites [facility]) M06.09     The patient has consented for synchronous (real-time) Telemedicine (audio-video technology) on 4/24/2020 for their care to be delivered over telemedicine in place of their regularly scheduled office visit pursuant to the emergency declaration under the [address] [place] [address], [address] [place] [address] authority and the [employer] Act, this Virtual  Visit was conducted, with patient's consent, to reduce the patient's risk of exposure to COVID-19 and provide continuity of care for an established patient. Services were provided through a video synchronous discussion virtually to substitute for in-person clinic visit. Inflammatory arthritis phenotype includes:  Anti-CCP positive: no  Rheumatoid factor positive: no  Erosive disease: yes   Extra-articular manifestations include: none    Immunosuppression Screening (1/21/2019):   Quantiferon TB: negative  PPD:  Not performed  Hepatitis B: negative   Hepatitis C: negative      Therapy History includes:  Current DMARD therapy include: methotrexate 25 mg SubQ every Wednesday (1/30/2020 to present), Humira 40 mg every Wednesday (7/23/2019 to present)  Prior DMARD therapy include: hydroxychloroquine 400 mg daily (1/2016 to 12/2017), hydroxychloroquine 600 mg daily (12/2017 to 1/21/2019; per PCP), methotrexate 20 mg every Wednesday (10/30/2019 to 1/24/2020), Humira 40 mg every 14 days (2/2019 to 7/23/2019)  Discontinued DMARDs because of inefficacy: hydroxychloroquine   Discontinued DMARDs because of side effects: None  Contra-Indicated DMARDs because of fatty liver/hepatitis: hydroxychloroquine, sulfasalazine, methotrexate, leflunomide    Immunization History   Administered Date(s) Administered    Influenza Vaccine 10/25/2015, 10/31/2016    Zoster Vaccine, Live 11/25/2013     Patient Active Problem List   Diagnosis Code    Unspecified sinusitis (chronic) J32.9    Osteopenia M85.80    Seronegative rheumatoid arthritis of multiple sites ([facility]) M06.09    Patellofemoral arthralgia of both knees M22.2X1, M22.2X2    Primary osteoarthritis of both knees M17.0    Primary osteoarthritis of both hands M19.041, M19.042    Severe obesity ([facility]) E66.01    Long-term use of immunosuppressant medication Z79.899     HISTORY OF PRESENT ILLNESS    Ms. Ubaldo Barrios returns for a follow-up. On her last visit, due to persistent disease, I changed her oral methotrexate 20 mg every Wednesday to 25 mg SubQ and continued Humira 40 mg every Wednesday, which she has been good tolerance. She notes small red spots after her injection. Today, she feels much better. Her hands feel better. She denies pain, swelling, or stiffness in her hands or wrists. She can make a fist normally. She endorses oral ulcers in the past week and a irritation on her nose with swelling since yesterday that was aggravated by her CPAP. She notes having a rash on her forehead. She denies fever, weight loss, blurred vision, vision loss, ankle swelling, dry cough, dyspnea, nausea, vomiting, dysphagia, abdominal pain, black or bloody stool, fall since last visit, easy bruising and increased thirst.    Ms. Ubaldo Barrios has continued her medications for arthritis and reports good tolerance without significant side effects. Last toxicity monitoring by blood work was done on 1/24/2020 and did not reveal any significant adverse effects, except Hct 32.9%,     Most recent inflammatory markers from 1/24/2020 revealed a ESR 10 mm/hr and CRP <1 mg/L. The patient has not had any interval hospital admissions, infections, or surgeries. REVIEW OF SYSTEMS    A comprehensive review of systems was performed and pertinent results are documented in the HPI, review of systems is otherwise non-contributory.     PAST MEDICAL HISTORY    She has a past medical history of Asthma, Chronic sinusitis, Fibromyalgia, GERD (gastroesophageal reflux disease), Hypertension, Lower GI bleed (2014), Multiple allergies, Obstructive sleep apnea on CPAP, and Osteopenia. FAMILY HISTORY    Her family history includes Coronary Artery Disease in her father; Diabetes in her mother. SOCIAL HISTORY    She reports that she has never smoked. She has never used smokeless tobacco. She reports that she does not drink alcohol or use drugs. MEDICATIONS    Current Outpatient Medications   Medication Sig Dispense Refill    folic acid (FOLVITE) 1 mg tablet Take 1 Tab by mouth daily. 90 Tab 4    aspirin delayed-release 81 mg tablet Take  by mouth daily.  methotrexate 25 mg/mL chemo injection 1 mL by SubCUTAneous route every Wednesday. 12 mL 0    loperamide (IMODIUM) 2 mg capsule Take  by mouth as needed for Diarrhea.  adalimumab (HUMIRA,CF, PEN) 40 mg/0.4 mL pnkt 40 mg by SubCUTAneous route every seven (7) days. Indications: rheumatoid arthritis (Patient taking differently: 40 mg by SubCUTAneous route every Wednesday. Indications: rheumatoid arthritis) 2 Kit 11    olopatadine (PATANASE) 0.6 % spry two (2) times a day.  atorvastatin (LIPITOR) 10 mg tablet Take  by mouth daily.  losartan (COZAAR) 100 mg tablet Take 100 mg by mouth daily.  calcium-cholecalciferol, D3, (CALTRATE 600+D) tablet Take 1 Tab by mouth two (2) times a day.  magnesium oxide (MAG-OX) 400 mg tablet Take 400 mg by mouth daily.  Cholecalciferol, Vitamin D3, 1,000 unit cap Take 1,000 Units by mouth two (2) times a day.  DULERA 200-5 mcg/actuation HFA inhaler Take 2 Puffs by inhalation two (2) times a day.  glucosamine-chondroit-vit c-mn (GLUCOSAMINE 1500 COMPLEX) 500-400 mg capsule Take 2 Caps by mouth nightly.  montelukast (SINGULAIR) 10 mg tablet Take 10 mg by mouth daily.         esomeprazole (NEXIUM) 40 mg capsule Take  by mouth daily.        cetirizine (ZYRTEC) 10 mg tablet Take  by mouth daily.  colestipol (COLESTID) 1 gram tablet Take 1 g by mouth two (2) times a day. ALLERGIES    Allergies   Allergen Reactions    Cottonseed Cough    Lamisil [Terbinafine] Rash    Malt Extract Cough and Other (comments)     Asthma         PHYSICAL EXAMINATION    There were no vitals taken for this visit. There is no height or weight on file to calculate BMI. General: Patient is alert, oriented x 3, not in acute distress    HEENT:   Sclerae are not injected and appear moist.  There is no alopecia. Neck is supple     Chest:  Breathing comfortably at room air. Musculoskeletal:  A comprehensive musculoskeletal exam was NOT performed for all joints of each upper and lower extremity and assessed for swelling, tenderness and range of motion.       Previous Exam  Bilateral Armando and Heberden nodes  Bilateral knee crepitus without effusion with patellar laxity and crepitus    Z-Deformities:   no  M[place] Neck Deformities:  no  Boutonierre's Deformities:  no  Ulnar Deviation:   yes  MCP Subluxation:  no    Joint Count 1/24/2020 10/24/2019 7/23/2019 4/23/2019 1/21/2019   Patient pain (0-100) 5 10 5 5 10   MHAQ 0.25 0.5 0.5 0.25 0.5   Left wrist- Tender 1 1 1 - -   Left wrist- Swollen 0 1 0 - -   Left 1st MCP - Tender - 1 1 1 1   Left 1st MCP - Swollen - 1 1 1 1   Left 2nd MCP - Tender 1 0 1 1 1   Left 2nd MCP - Swollen 1 1 1 1 1   Left 3rd MCP - Tender 1 1 1 1 1   Left 3rd MCP - Swollen - 1 1 1 1   Left 4th MCP - Tender - - 1 - -   Left 4th MCP - Swollen - - 0 - -   Left 5th MCP - Tender - - 1 - 1   Left 5th MCP - Swollen - - 1 - 0   Left 2nd PIP - Tender - - 1 1 1   Left 2nd PIP - Swollen - - 1 0 1   Left 3rd PIP - Tender 1 - 1 1 1   Left 3rd PIP - Swollen 1 - 1 1 1   Left 4th PIP - Tender - - 1 - 1   Left 4th PIP - Swollen - - 1 - -   Left 5th PIP - Tender - - 1 - -   Left 5th PIP - Swollen - - 0 - -   Right elbow - Tender - - - - 1 Right elbow - Swollen - - - - 0   Right wrist- Tender - 1 0 1 -   Right wrist- Swollen - 0 1 1 -   Right 1st MCP - Tender 1 1 0 1 1   Right 1st MCP - Swollen 1 1 1 1 1   Right 2nd MCP - Tender - - 0 1 1   Right 2nd MCP - Swollen - - 1 0 1   Right 3rd MCP - Tender 1 1 0 1 1   Right 3rd MCP - Swollen 1 1 1 1 1   Right 4th MCP - Tender 1 1 - 1 1   Right 4th MCP - Swollen 1 0 - 1 1   Right 5th MCP - Tender 0 - 0 1 1   Right 5th MCP - Swollen 1 - 1 1 1   Right 2nd PIP - Tender 0 1 1 1 1   Right 2nd PIP - Swollen 1 1 1 1 1   Right 3rd PIP - Tender 1 1 1 1 1   Right 3rd PIP - Swollen 1 1 1 1 1   Right 4th PIP - Tender 1 1 1 1 1   Right 4th PIP - Swollen 1 1 1 1 1   Right 5th PIP - Tender - - - - 1   Right 5th PIP - Swollen - - - - 1   Tender Joint Count (Total) 9 10 13 14 17   Swollen Joint Count (Total) 9 9 15 12 14   Physician Assessment (0-10) 3 3 5 4 5   Patient Assessment (0-10) 0.5 1 5.5 0.5 2   CDAI Total (calculated) 21.5 23 38.5 30.5 38     DATA REVIEW    Laboratory     Recent laboratory results were reviewed, summarized, and discussed with the patient. Imaging    Musculoskeletal Ultrasound    None    Radiographs    Bilateral Hand 1/21/2019: RIGHT: osteopenia. No acute fracture or dislocation. Radiocarpal and intercarpal joints are age-appropriate. No erosion of intercarpal or CMC joints. First MCP joint space narrowing is increased. There are small marginal osteophytes. No erosion. Anterior subluxation of the third MCP joint and joint space narrowing are increased. There are are increased subtle erosions of the third metacarpal head. Fifth MCP joint erosion is unchanged. Third PIP joint space narrowing is mild and unchanged. The DIP joints are within normal limits. LEFT: osteopenia. No acute fracture or dislocation. Mild anterior subluxation of the third MCP joint is increased. Radiocarpal and intercarpal joints are within normal limits. First CMC joint osteoarthritis is mild and new.  Third MCP joint space narrowing and subtle erosions are increased. First MCP joint osteoarthritis is mild and new. IP joints are within normal limits. No periosteal reaction or chondrocalcinosis. Bilateral Foot 1/21/2019: RIGHT: osteopenia. No acute fracture or dislocation. Intertarsal and TMT joints are within normal limits. Hallux valgus angulation measures 45 degrees. Mild first MTP joint osteoarthritis. MTS joint subluxation and arthritis are partially imaged. Remaining MTP joints are within normal limits. IP joints are difficult to visualize given the toe positioning. There are soft tissue calcifications adjacent to the distal tibia. LEFT: osteopenia. No acute fracture or dislocation. Moderate plantar calcaneal spur. Mild talonavicular joint osteoarthritis. Possible third TMT joint osteoarthritis. Hallux valgus angulation measures 40 degrees. Mild first MTP joint osteoarthritis. MTS joint subluxation and arthritis are partially imaged. Mild third PIP and DIP joint osteoarthritis. No evidence of IP joint erosion. Soft tissue calcifications are adjacent to the distal tibia. No periosteal reaction or chondrocalcinosis. Bilateral Hand 11/25/2015: RIGHT: no acute fracture or dislocation. Alignment is anatomic. Severe joint space narrowing is seen at the third MCP joint. Small erosions are suspected in the head of the third metacarpal. Mild joint space narrowing is seen in the first MCP joint. Generalized osteopenia is noted. Soft tissue swelling surrounds the third MCP joint. LEFT: no acute fracture or dislocation. Alignment is anatomic. Severe joint space is seen at the third  MCP joint mild joint space. Mild joint space narrowing is seen at the first MCP joint. No erosions are seen. Generalized osteopenia is noted. There is the suggestion of mild soft tissue swelling surrounding the third MCP joint. CT Imaging    CT Chest without contrast 1/16/2017: The thoracic inlet is unremarkable.   No mediastinal nor hilar masses nor adenopathy. No thoracic aortic aneurysm. The central airways are patent. Minimal hypoventilation is appreciated panel placed. The lungs are otherwise clear. There is diffuse low attenuation within the liver parenchyma. The remaining visualized upper abdominal viscera are unremarkable. There are no aggressive appearing osseous lesions. Degenerative changes within the mid thoracic spine. MR Imaging    None    DXA      DXA 11/01/2016: (excluded L1, right hip, distal radius) lumbar spine L1-L3 T score 1.5 (BMD 1.183 g/cm2), left femoral neck T score: -0.5 (0.880 g/cm2). FRAX score 17 % probability in 10 years for major osteoporotic fracture and 3.7 % 10 year probability of hip fracture. ASSESSMENT AND PLAN    This is a follow-up visit for Ms. Francis. 1) Seronegative Erosive Rheumatoid Arthritis. She is maintained on methotrexate 25 mg every Wednesday and Humira 40 mg every Wedneday with good tolerance and feels better. Her CDAI was previously 21.5 (previously 23, 38.5, 30.5, 38) with 9 tender and 9 swollen joints, consistent with high disease activity. I will continue treatments. I refilled folic acid. I asked her to get labs down at her PCPs. Due to her fatty liver, conventional DMARDs, such as sulfasalazine, methotrexate, leflunomide, and hydroxychloroquine are relatively contra-indicated. 2) Long Term Use of Immunosuppressants. The patient remains on immunomodulatory medications (methotrexate, Humira) and requires frequent toxicity monitoring by blood work. To be done at Dr. Jocelyne Vega office. 3) Bilateral Patellofemoral Arthritis. This was no long an active issue. I had referred her to physical therapy, which helped. 4) Bilateral Knee Osteoarthritis. I had referred her to physical therapy which helped. 5) Bilateral Hand Osteoarthritis. I had recommended ball squeezing and holding until hand fatigue then alternating to other hand exercises 10 times twice daily.     6) Chronic Back Pain. She has done physical therapy which helped, but still reports difficulty standing upright due to low back pain and limitation in walking distances due to low back pain. She now follows with Dr. Yuridia Palafox who performed an epidural yesterday with great relief. 7) Long Term Use of Bisphosphonates. She reports taking Fosamax for around 5 years or more. Stan Sykes al reported that the risk of atypical femoral fractures is low, with an incidence of up to 50 per 100,000 person-years during the first 5 years of bisphosphonate use, resulting in a clear positive benefit/risk ratio within this time frame (J Bone  Res. 2016 Jan;31(1):16-35). I will discontinue it. I asked her ot discuss with Dr. Mana Seymour to schedule a DXA. The patient voiced understanding of the aforementioned assessment and plan. The patient has consented for synchronous (real-time) Telemedicine (audio-video technology) on 4/24/2020 for their care to be delivered over telemedicine in place of their regularly scheduled office visit pursuant to the emergency declaration under the [facility], [facility] waiver authority and the [employer] Act, this Virtual  Visit was conducted, with patient's consent, to reduce the patient's risk of exposure to COVID-19 and provide continuity of care for an established patient. Services were provided through a video synchronous discussion virtually to substitute for in-person clinic visit.     TODAY'S ORDERS    Orders Placed This Encounter    CBC WITH AUTOMATED DIFF    METABOLIC PANEL, COMPREHENSIVE    VITAMIN D, 25 HYDROXY    folic acid (FOLVITE) 1 mg tablet     Future Appointments   Date Time Provider Department Center   4/24/2020 10:20 AM MD Maikel Pineda MD, [address]    Adult Rheumatology   Rheumatology Ultrasound Certified  [facility]  A [facility] of [facility], [address] [place] [address] Road   Phone [phone]  Fax [phone]

## 2020-04-24 ENCOUNTER — VIRTUAL VISIT (OUTPATIENT)
Dept: RHEUMATOLOGY | Age: 75
End: 2020-04-24

## 2020-04-24 DIAGNOSIS — M06.09 SERONEGATIVE RHEUMATOID ARTHRITIS OF MULTIPLE SITES (HCC): ICD-10-CM

## 2020-04-24 DIAGNOSIS — Z79.60 LONG-TERM USE OF IMMUNOSUPPRESSANT MEDICATION: Primary | ICD-10-CM

## 2020-04-24 DIAGNOSIS — Z78.0 POST-MENOPAUSAL: ICD-10-CM

## 2020-04-24 RX ORDER — FOLIC ACID 1 MG/1
1 TABLET ORAL DAILY
Qty: 90 TAB | Refills: 4 | Status: SHIPPED | OUTPATIENT
Start: 2020-04-24 | End: 2021-05-21 | Stop reason: SDUPTHER

## 2020-06-19 DIAGNOSIS — M06.09 SERONEGATIVE RHEUMATOID ARTHRITIS OF MULTIPLE SITES (HCC): ICD-10-CM

## 2020-06-21 RX ORDER — METHOTREXATE 25 MG/ML
25 INJECTION, SOLUTION INTRA-ARTERIAL; INTRAMUSCULAR; INTRAVENOUS
Qty: 12 VIAL | Refills: 1 | Status: SHIPPED | OUTPATIENT
Start: 2020-06-24 | End: 2020-10-22 | Stop reason: SDUPTHER

## 2020-07-08 DIAGNOSIS — M06.09 SERONEGATIVE RHEUMATOID ARTHRITIS OF MULTIPLE SITES (HCC): ICD-10-CM

## 2020-07-08 RX ORDER — ADALIMUMAB 40MG/0.4ML
40 KIT SUBCUTANEOUS
Qty: 2 KIT | Refills: 11 | Status: SHIPPED | OUTPATIENT
Start: 2020-07-08 | End: 2021-07-14 | Stop reason: SDUPTHER

## 2020-09-25 NOTE — PROGRESS NOTES
REASON FOR VISIT    This is a Dr. Margarita Bundy follow-up visit for Ms. Francis for Seronegative Rheumatoid Arthritis. Inflammatory arthritis phenotype includes:  Anti-CCP positive: no  Rheumatoid factor positive: no  Erosive disease: yes (possible right 3rd MCP 11/25/2015)  Extra-articular manifestations include: none    Immunosuppression Screening:  Quantiferon TB: N/A  PPD:  Not performed  Hepatitis B: negative (11/25/2015)  Hepatitis C: negative (11/25/2015)    Therapy History includes:  Current DMARD therapy include: hydroxychloroquine 600 mg daily (12/2017 to 1/21/2019)  Prior DMARD therapy include: hydroxychloroquine 400 mg daily (1/2016 to 12/2017)  Discontinued DMARDs because of inefficacy: None  Discontinued DMARDs because of side effects: None  Contra-Indicated DMARDs because of fatty liver/hepatitis: hydroxychloroquine, sulfasalazine, methotrexate, leflunomide    Immunization History   Administered Date(s) Administered    Influenza Vaccine 10/25/2015, 10/31/2016    Zoster Vaccine, Live 11/25/2013       Patient Active Problem List   Diagnosis Code    Unspecified sinusitis (chronic) J32.9    Osteopenia M85.80    Inflammatory polyarthritis ([facility] [address] M06.4    Long-term use of Plaquenil Z79.899    Seronegative rheumatoid arthritis of multiple sites ([facility] [address] M06.09    Patellofemoral arthralgia of both knees M22.2X1, M22.2X2    Primary osteoarthritis of both knees M17.0    Primary osteoarthritis of both hands M19.041, M19.042       HISTORY OF PRESENT ILLNESS    Ms. Siddharth Paredes returns for a follow-up. I reviewed her medical record, including Dr. Donna Shearer office notes, laboratories and imaging and summarized them in this note. On her visit with Dr. Margarita Bundy on 9/28/2017 \"for follow up of arthritis. She notes increased pain in \"new joints\" in the hands recently. She has some difficulty gripping objects. Chronic low back pain is noted. She has AM stiffness of  \"3-4 minutes. \" She has swelling of the fingers.  She is not The following information and instructions were given:    Do not eat, drink, smoke or chew gum after midnight the night before surgery. This includes no mints.  Take all routine, prescribed medications including heart and blood pressure medicines with a sip of water unless otherwise instructed by your physician.   Do NOT take diabetic medication unless instructed by your physician.    DO NOT shave for two days before your procedure.  Do not wear makeup.      DO NOT wear fingernail polish (gel/regular) and/or acrylic/artificial nails on the day of surgery.   If you had a recent manicure and would rather not remove polish or artificial nails, the minimum requirement is that the polish/artificial nails must be removed from the middle finger on each hand.      If you are having surgery/procedure on an upper extremity, fingernail polish/artificial fingernails must be removed for surgery.  NO EXCEPTIONS.      If you are having surgery/procedure on a lower extremity, toenail polish on both extremities must be removed for surgery.  NO EXCEPTIONS.    Remove all jewelry (advise to go to jeweler if unable to remove).  Jewelry, especially rings, can no longer be taped for surgery.    Leave anything you consider valuable at home.    Leave your suitcase in the car until after your surgery.    Bring the following with you the day of your procedure (when applicable):       -Picture ID and insurance cards       -Co-pay/deductible required by insurance       -Medications in the original bottles (not a list) including all over-the-counter medications if not brought to PAT       -Copy of advance directive, living will or power of  documents if not brought to PAT       -CPAP or BIPAP mask and tubing (do not bring machine)       -Skin prep instruction(s) sheet       -PAT Pass    Education booklet, brochure, handout or binder related to procedure given to patient.  Education booklet also includes general information related to  taking naproxen. She has been taking Plaquenil 200 mg twice daily. She is taking prednisone 3 mg daily (2 mg daily two days of the week). She has tolerated this dose. She is taking calcium 600 mg twice daily and vitamin D 400 units twice daily. She takes an additional vitamin D 1000 units daily. She is taking alendronate 70 mg weekly. 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 She is working on losing weight. \" Under assessment, he wrote \"RF, CCP, NEMESIO negative. RNP likely false positive. 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 OA of knees as well. Hand radiographs with significant JSN at each 3rd MCP. She has been taking Plaquenil 200 mg BID. More significant proximal myalgias recently raised consideration of PMR (with initial ESR/CRP normal), though still most likely seronegative RA. Prednisone has helped (particularly proximal myalgias noted earlier in the year). She is taking 2-3 mg daily. Symptoms overall controlled with current symptoms more likely related to hand OA. \"    She is on hydroxychloroquine 600 mg daily, dose increased by her PCP, for about 1.5 year without much benefit. Her most recent ophthalmology in 2018. Today, she complains of aching and dull pain and stiffness in her hands. There is pain at rest and worse with use. She endorses swelling. She has stiffness lasting hours. She avoid NSAIDs or Tylenol due to liver problems. She outer hip and knee aching pain and pain in her muscles, which is worse when walking up and down steps. She has not done physical therapy. She is doing fitness class 3 times a week and is using a CPAP. She endorses blurred vision.     She denies fever, weight loss, vision loss, oral ulcers, ankle swelling, dry cough, dyspnea, nausea, vomiting, dysphagia, abdominal pain, black or bloody stool, fall since last visit, rash, easy bruising and increased thirst.    Ms. Sharri Duron has continued her medications for arthritis and reports their recovery that mentions signs and symptoms of infection and when to call the doctor.    When applicable, an ERAS handout/booklet was given to patient.    Pain Control After Surgery handout given to patient.    Respirex use (handout given to patient) and pneumonia prevention education provided.    Signs and Symptoms of infection discussed with patient in Pre Admission Testing.  Patient instructed to call their doctor if any of the following symptoms are noted during recovery:  Fever of 100.4 F or higher, incision that is warm or has increasing bleeding, redness or drainage.    DVT Prevention instructions given verbally during Pre Admission Testing appointment that stress the importance of ambulation to improve blood circulation.  Also encouraged patient to perform foot exercises when in bed and application of a sequential device may be applied to lower extremities to improve circulation.      Please apply Chlorhexidine wipes to surgical area (if instructed) the night before procedure and the AM of procedure and document date/time of applications on skin prep instruction sheet.           good tolerance without significant side effects. Last toxicity monitoring by blood work was done on 5/04/2017 and did not reveal any significant adverse effects, . Most recent inflammatory markers from 5/04/2017 revealed a ESR 11 mm/hr (previously 4 mm/hr) and CRP 4.0 mg/L (previously 1.6 mg/L). The patient has not had any interval hospital admissions, infections, or surgeries. REVIEW OF SYSTEMS    A comprehensive review of systems was performed and pertinent results are documented in the HPI, review of systems is otherwise non-contributory. PAST MEDICAL HISTORY    She has a past medical history of Asthma, Chronic sinusitis, Fibromyalgia, GERD (gastroesophageal reflux disease), Hypertension, Lower GI bleed (2014), Multiple allergies, Obstructive sleep apnea on CPAP, and Osteopenia. FAMILY HISTORY    Her family history includes Coronary Artery Disease in her father; Diabetes in her mother. SOCIAL HISTORY    She reports that  has never smoked. she has never used smokeless tobacco. She reports that she does not drink alcohol or use drugs. MEDICATIONS    Current Outpatient Medications   Medication Sig Dispense Refill    atorvastatin (LIPITOR) 10 mg tablet Take  by mouth daily.  losartan (COZAAR) 100 mg tablet Take 100 mg by mouth daily.  adalimumab (HUMIRA,CF, PEN) 40 mg/0.4 mL pnkt 40 mg by SubCUTAneous route every fourteen (14) days. 6 Kit 11    hydroxychloroquine (PLAQUENIL) 200 mg tablet TAKE 1 TABLET TWICE A DAY (Patient taking differently: Take 600 mg by mouth daily. TAKE 1 TABLET TWICE A DAY) 180 Tab 1    albuterol (PROVENTIL VENTOLIN) 2.5 mg /3 mL (0.083 %) nebulizer solution       calcium-cholecalciferol, D3, (CALTRATE 600+D) tablet Take 1 Tab by mouth two (2) times a day.  alendronate (FOSAMAX) 70 mg tablet 70 mg once weekly. Please review proper mode of taking 4 Tab 11    magnesium oxide (MAG-OX) 400 mg tablet Take 400 mg by mouth daily.       Cholecalciferol, Vitamin D3, 1,000 unit cap Take 1,000 Units by mouth two (2) times a day.  ketoconazole (NIZORAL) 2 % topical cream Apply  to affected area as needed.  DULERA 200-5 mcg/actuation HFA inhaler Take 2 Puffs by inhalation two (2) times a day.  glucosamine-chondroit-vit c-mn (GLUCOSAMINE 1500 COMPLEX) 500-400 mg capsule Take 2 Caps by mouth nightly.  montelukast (SINGULAIR) 10 mg tablet Take 10 mg by mouth daily.  esomeprazole (NEXIUM) 40 mg capsule Take  by mouth daily.  cetirizine (ZYRTEC) 10 mg tablet Take  by mouth daily. ALLERGIES    Allergies   Allergen Reactions    Cottonseed Cough    Lamisil [Terbinafine] Rash    Malt Extract Cough and Other (comments)     Asthma         PHYSICAL EXAMINATION    Visit Vitals  /78   Pulse 80   Temp 98 °F (36.7 °C)   Resp 18   Ht 5' 2\" (1.575 m)   Wt 216 lb (98 kg)   BMI 39.51 kg/m²     Body mass index is 39.51 kg/m². General: Patient is alert, oriented x 3, not in acute distress    HEENT:   Sclerae are not injected and appear moist.  Oral mucous membranes are moist, there are no ulcers present. There is no alopecia. Neck is supple     Cardiovascular:  Heart is regular rate and rhythm, no murmurs. Chest:  Lungs are clear to auscultation bilaterally. No rhonchi, wheezes, or crackles.     Extremities:  Free of clubbing, cyanosis, edema    Neurological exam:  Muscle strength is full in upper and lower extremities     Skin exam:    Psoriasis:     no  Nail Pitting:     no  Onycholysis:     no  Palmoplantar pustulosis:   no  Acne fulminans:    no  Acne conglobata:    no  Hidradenitis Suppurativa:   no  Dissecting cellulitis of the scalp:  no  Pilonidal sinus:    no  Erythema nodosum:    no  Non-Scarring Alopecia:  no  Discoid Lupus:   no  Subacute Cutaneous Lupus:   no  Heliotrope Rash:   no  Upper Arm Erythema:   no  Shawl Sign:    no  V-sign:     no  Holster sign:    no  Gottron's papules:   no  Gottron's sign: no  Calcinosis:    no  Raynaud's Phenomenon:  no  's Hands:   no  Periungual erythema:   no  Abnormal Nailfold Capillaries: no  Livedo Reticularis:   no  Scalp Erythema:   no  Rheumatoid Nodules:   no    Musculoskeletal:  A comprehensive musculoskeletal exam was performed for all joints of each upper and lower extremity and assessed for swelling, tenderness and range of motion. Bilateral Armando and Heberden nodes  Bilateral knee crepitus without effusion with patellar laxity and crepitus    Z-Deformities:   no  [place] Neck Deformities:  no  Boutonierre's Deformities:  no  Ulnar Deviation:   yes  MCP Subluxation:  no    Joint Count 1/21/2019   Patient pain (0-100) 10   MHAQ 0.5   Left 1st MCP - Tender 1   Left 1st MCP - Swollen 1   Left 2nd MCP - Tender 1   Left 2nd MCP - Swollen 1   Left 3rd MCP - Tender 1   Left 3rd MCP - Swollen 1   Left 5th MCP - Tender 1   Left 5th MCP - Swollen 0   Left 2nd PIP - Tender 1   Left 2nd PIP - Swollen 1   Left 3rd PIP - Tender 1   Left 3rd PIP - Swollen 1   Left 4th PIP - Tender 1   Right elbow - Tender 1   Right elbow - Swollen 0   Right 1st MCP - Tender 1   Right 1st MCP - Swollen 1   Right 2nd MCP - Tender 1   Right 2nd MCP - Swollen 1   Right 3rd MCP - Tender 1   Right 3rd MCP - Swollen 1   Right 4th MCP - Tender 1   Right 4th MCP - Swollen 1   Right 5th MCP - Tender 1   Right 5th MCP - Swollen 1   Right 2nd PIP - Tender 1   Right 2nd PIP - Swollen 1   Right 3rd PIP - Tender 1   Right 3rd PIP - Swollen 1   Right 4th PIP - Tender 1   Right 4th PIP - Swollen 1   Right 5th PIP - Tender 1   Right 5th PIP - Swollen 1   Tender Joint Count (Total) 17   Swollen Joint Count (Total) 14   Patient Assessment (0-10) 2     DATA REVIEW    Laboratory     Recent laboratory results were reviewed, summarized, and discussed with the patient. Imaging    Musculoskeletal Ultrasound    None    Radiographs    Bilateral Hand 11/25/2015: RIGHT: no acute fracture or dislocation. Alignment is anatomic. Severe joint space narrowing is seen at the third MCP joint. Small erosions are suspected in the head of the third metacarpal. Mild joint space narrowing is seen in the first MCP joint. Generalized osteopenia is noted. Soft tissue swelling surrounds the third MCP joint. LEFT: no acute fracture or dislocation. Alignment is anatomic. Severe joint space is seen at the third  MCP joint mild joint space. Mild joint space narrowing is seen at the first MCP joint. No erosions are seen. Generalized osteopenia is noted. There is the suggestion of mild soft tissue swelling surrounding the third MCP joint. CT Imaging    CT Chest without contrast 1/16/2017: The thoracic inlet is unremarkable. No mediastinal nor hilar masses nor adenopathy. No thoracic aortic aneurysm. The central airways are patent. Minimal hypoventilation is appreciated panel placed. The lungs are otherwise clear. There is diffuse low attenuation within the liver parenchyma. The remaining visualized upper abdominal viscera are unremarkable. There are no aggressive appearing osseous lesions. Degenerative changes within the mid thoracic spine. MR Imaging    None    DXA     DXA 11/01/2016: (excluded L1, right hip, distal radius) lumbar spine L1-L3 T score 1.5 (BMD 1.183 g/cm2), left femoral neck T score: -0.5 (0.880 g/cm2). FRAX score 17 % probability in 10 years for major osteoporotic fracture and 3.7 % 10 year probability of hip fracture. ASSESSMENT AND PLAN    This is a follow-up visit for Ms. Francis. 1) Seronegative Erosive Rheumatoid Arthritis. There were probable erosions on the right 3rd MCP head. She has been on hydroxychloroquine 600 mg daily since 12/2017 per her PCP. The dosing is 6.5 mg/kg and higher doses have a higher risk for retinopathy especially in the elderly and when underlying liver disease is present. Her CDAI was 38 with 17 tender and 14 swollen joints, consistent with high disease activity. Due to her fatty liver, conventional DMARDs, such as sulfasalazine, methotrexate, leflunomide, and hydroxychloroquine are contra-indicated due to monitoring and higher risk potential adverse events. I discussed with her about advancing therapy to a biologic (anti-TNF). We discussed the potential adverse effects, which include infections, such as upper respiratory infections, latent TB reactivation, viral hepatitis activation, and routes of administration (oral versus infusion versus subcutaneous). The patient was informed about the low risk for lymphoma based on at least 5 years of post-market data and the likely inherent relation between chronic autoimmune diseases, such as Rheumatoid Arthritis, and lymphoma. The patient was informed these medications co-pay are subject to the patient's insurance coverage and we will not know until it has been submitted to the insurance company. She preferred Humira. An order will be submitted today [facility] for Humira. I ordered labs and radiographs today. I discontinued hydroxychloroquine. She declines prednisone due to history of potential adverse reactions. 2) Long Term Use of Hydroxychloroquine (Plaquenil). She is on hydroxychloroquine 600 mg daily. See #1. I discontinued hydroxychloroquine. 3) Bilateral Patellofemoral Arthritis. This is likely the source of her knee pain per history and exam. I referred her to physical therapy. 4) Bilateral Knee Osteoarthritis. The patient has osteoarthritis, which is also known as \"wear and tear arthritis,\" non-inflammatory arthritis or mechanical arthritis. There are hereditary, vocational and posttraumatic joint injuries predisposing factors.  I recommend maintaining a healthy weight to slow the progression of osteoarthritis in addition to following the [employer] of Rheumatology Osteoarthritis Treatment Guidelines: (1) non-pharmacologic modalities such as aerobic, aquatic, and/or resistance exercises as well as weight loss for overweight patients; in addition to (2) pharmacologic modalities such as acetaminophen as first line, oral and topical NSAIDs as second line, tramadol as third line and intra-articular corticosteroid injections as fourth line (Marcy MC, et al. Arthritis Care Res [facility] ORTHOPEDIC). 2012;64(4):465). Naproxen is a low THRASHER-2 selectivity inhibitor that poses lower cardiovascular risk than other NSAIDs (Alma Santoyo. Clinical Pharmacology and Cardiovascular Safety of Naproxen. Am J Cardiovasc Drugs. 2016 Nov 8). NSAIDs should not be used in patients on blood thinners, chronic kidney disease, high risk coronary artery disease, and inflammatory bowel disease (ulcerative colitis or Crohn's disease) Joint replacement surgery if all previous fail, knowing that there is a 10 year lifespan per prosthesis. I recommend weight loss because every 1 lb of extra weight is approximately 5 lbs of extra weight on the knees. I referred her to physical therapy. 5) Bilateral Hand Osteoarthritis. The patient has osteoarthritis, which is also known as \"wear and tear arthritis,\" non-inflammatory arthritis or mechanical arthritis. There are hereditary, vocational and posttraumatic joint injuries predisposing factors. I recommend non-pharmacologic modalities such as keeping hands warm, avoid activities that case pain, warm water soaks, in addition to (2) pharmacologic modalities such as acetaminophen as first line, oral and topical NSAIDs as second line. Naproxen is a low THRASHER-2 selectivity inhibitor that poses lower cardiovascular risk than other NSAIDs (Alma Santoyo. Clinical Pharmacology and Cardiovascular Safety of Naproxen. Am J Cardiovasc Drugs. 2016 Nov 8).  NSAIDs should not be used in patients on blood thinners, chronic kidney disease, high risk coronary artery disease, and inflammatory bowel disease (ulcerative colitis or Crohn's disease) I recommended ball squeezing and holding until hand fatigue then alternating to other hand exercises 10 times twice daily. The patient voiced understanding of the aforementioned assessment and plan. Summary of plan was provided in the After Visit Summary patient instructions.      TODAY'S ORDERS    Orders Placed This Encounter    QUANTIFERON-TB GOLD PLUS    XR FOOT LT MIN 3 V    XR FOOT RT MIN 3 V    XR HAND LT MIN 3 V    XR HAND RT MIN 3 V    CHRONIC HEPATITIS PANEL    METABOLIC PANEL, COMPREHENSIVE    C REACTIVE PROTEIN, QT    SED RATE (ESR)    RHEUMATOID FACTOR, QL    PROTEIN ELECTROPHORESIS W/ REFLX ANABELLE    CBC WITH AUTOMATED DIFF    CYCLIC CITRUL PEPTIDE AB, IGG    VITAMIN D, 25 HYDROXY    REFERRAL TO PHYSICAL THERAPY    adalimumab (HUMIRA,CF, PEN) 40 mg/0.4 mL pnkt     Future Appointments   Date Time Provider Dionisio Halei   4/23/2019 10:20 AM Nati Gabriel MD [place], MD, [address]    Adult Rheumatology   Rheumatology Ultrasound Certified  [address]  [address] [address]   Phone [phone]  Fax [phone]

## 2020-10-04 NOTE — TELEPHONE ENCOUNTER
From: Jose Cruz  Sent: 12/5/2019 11:31 AM EST  To: Bárbara Reaves MD  Subject: RE: Non-Urgent Medical Question    Yes, I am taking Metatrexate the same night as taking Humaria shot. It has been a month of this regime. ----- Message -----  From: Bárbara Reaves MD  Sent: 12/5/2019 11:07 AM EST  To: Jose Cruz  Subject: RE: Non-Urgent Medical Question  Yes, the anemia is likely from chronic inflammation from your Rheumatoid Arthritis more than anything and should improve when your Rheumatoid Arthritis is better controlled. Have you been taking methotrexate? Shortness of breath may be due to the anemia.      ----- Message -----   From: Jose Cruz   Sent: 12/5/2019 10:42 AM EST   To: Bárbara Reaves MD  Subject: RE: Non-Urgent Medical Question    Wonder if you have reviewed the latest bloodwork with even lower hemoglobin than initially found. Seem to be having more shortness of breath even with simple walking. Any suggestions?     Andree Francis
Lumbar radiculopathy, acute

## 2020-10-22 RX ORDER — FOLIC ACID 1 MG/1
1 TABLET ORAL DAILY
Qty: 90 TAB | Refills: 4 | Status: CANCELLED | OUTPATIENT
Start: 2020-10-22

## 2020-10-23 ENCOUNTER — VIRTUAL VISIT (OUTPATIENT)
Dept: RHEUMATOLOGY | Age: 75
End: 2020-10-23
Payer: COMMERCIAL

## 2020-10-23 DIAGNOSIS — M48.062 SPINAL STENOSIS OF LUMBAR REGION WITH NEUROGENIC CLAUDICATION: ICD-10-CM

## 2020-10-23 DIAGNOSIS — M06.09 SERONEGATIVE RHEUMATOID ARTHRITIS OF MULTIPLE SITES (HCC): Primary | ICD-10-CM

## 2020-10-23 DIAGNOSIS — Z79.60 LONG-TERM USE OF IMMUNOSUPPRESSANT MEDICATION: ICD-10-CM

## 2020-10-23 PROCEDURE — 99215 OFFICE O/P EST HI 40 MIN: CPT | Performed by: INTERNAL MEDICINE

## 2020-10-23 RX ORDER — METHOTREXATE 25 MG/ML
25 INJECTION, SOLUTION INTRA-ARTERIAL; INTRAMUSCULAR; INTRAVENOUS
Qty: 12 VIAL | Refills: 1 | Status: SHIPPED | OUTPATIENT
Start: 2020-10-28 | End: 2021-05-21 | Stop reason: SDUPTHER

## 2020-10-23 NOTE — PROGRESS NOTES
REASON FOR VISIT    This is a follow-up visit for Ms. Francis for     ICD-10-CM   1. Seronegative rheumatoid arthritis of multiple sites [facility]) M06.09     The patient has consented for synchronous (real-time) Telemedicine (audio-video technology) on 10/23/2020 for their care to be delivered over telemedicine in place of their regularly scheduled office visit pursuant to the emergency declaration under the [address] [place] [address], [address] [place] [address] and the [employer] Act, this Virtual  Visit was conducted, with patient's consent, to reduce the patient's risk of exposure to COVID-19 and provide continuity of care for an established patient. Services were provided through a video synchronous discussion virtually to substitute for in-person clinic visit.     Inflammatory arthritis phenotype includes:  Anti-CCP positive: no  Rheumatoid factor positive: no  Erosive disease: yes   Extra-articular manifestations include: none    Immunosuppression Screening (1/24/2020):  Quantiferon TB: negative  PPD:  Not performed  Hepatitis B: negative   Hepatitis C: negative      Therapy History includes:  Current DMARD therapy include: methotrexate 25 mg SubQ every Wednesday (1/30/2020 to present), Humira 40 mg every Wednesday (7/23/2019 to present)  Prior DMARD therapy include: hydroxychloroquine 400 mg daily (1/2016 to 12/2017), hydroxychloroquine 600 mg daily (12/2017 to 1/21/2019; per PCP), methotrexate 20 mg every Wednesday (10/30/2019 to 1/24/2020), Humira 40 mg every 14 days (2/2019 to 7/23/2019)  Discontinued DMARDs because of inefficacy: hydroxychloroquine   Discontinued DMARDs because of side effects: None  Relative Contra-Indicated DMARDs because of fatty liver/hepatitis: hydroxychloroquine, sulfasalazine, methotrexate, leflunomide    Immunization History   Administered Date(s) Administered    Influenza Vaccine 10/25/2015, 10/31/2016    Zoster Vaccine, Live 11/25/2013     Patient Active Problem List   Diagnosis Code    Unspecified sinusitis (chronic) J32.9    Osteopenia M85.80    Seronegative rheumatoid arthritis of multiple sites ([facility]) M06.09    Patellofemoral arthralgia of both knees M22.2X1, M22.2X2    Primary osteoarthritis of both knees M17.0    Primary osteoarthritis of both hands M19.041, M19.042    Severe obesity ([facility]) E66.01    Long-term use of immunosuppressant medication Z79.899     HISTORY OF PRESENT ILLNESS    Ms. Ap Giron returns for a follow-up. On her last visit, I continued methotrexate 25 mg SubQ every Wednesday and Humira 40 mg every Wednesday, which she has been good tolerance. Today, she feels well except for her chronic back issues. She had spinal injections without relief. I reviewed Dr. Hang Luna office note. She denies pain, swelling, or stiffness in her hands or wrists. She can make a fist normally. She denies fever, weight loss, blurred vision, vision loss, oral ulcers, ankle swelling, dry cough, dyspnea, nausea, vomiting, dysphagia, abdominal pain, black or bloody stool, fall since last visit, rash, easy bruising and increased thirst.    Last toxicity monitoring by blood work was done on 4/27/2020 (PCP's lab results) and did not reveal any significant adverse effects. Most recent inflammatory markers from 1/24/2020 revealed a ESR 10 mm/hr and CRP <1 mg/L. The patient has not had any interval hospital admissions, infections, or surgeries. REVIEW OF SYSTEMS    A comprehensive review of systems was performed and pertinent results are documented in the HPI, review of systems is otherwise non-contributory. PAST MEDICAL HISTORY    She has a past medical history of Asthma, Chronic sinusitis, Fibromyalgia, GERD (gastroesophageal reflux disease), Hypertension, Lower GI bleed (2014), Multiple allergies, Obstructive sleep apnea on CPAP, and Osteopenia.     FAMILY HISTORY    Her family history includes Coronary Artery Disease in her father; Diabetes in her mother. SOCIAL HISTORY    She reports that she has never smoked. She has never used smokeless tobacco. She reports that she does not drink alcohol or use drugs. MEDICATIONS    Current Outpatient Medications   Medication Sig Dispense Refill    [START ON 10/28/2020] methotrexate 25 mg/mL chemo injection 1 mL by SubCUTAneous route every Wednesday. 12 Vial 1    adalimumab (Humira,CF, Pen) 40 mg/0.4 mL injection pen 0.4 mL by SubCUTAneous route every Wednesday. Indications: rheumatoid arthritis 2 Kit 11    folic acid (FOLVITE) 1 mg tablet Take 1 Tab by mouth daily. 90 Tab 4    aspirin delayed-release 81 mg tablet Take  by mouth daily.  loperamide (IMODIUM) 2 mg capsule Take  by mouth as needed for Diarrhea.  colestipol (COLESTID) 1 gram tablet Take 1 g by mouth two (2) times a day.  olopatadine (PATANASE) 0.6 % spry two (2) times a day.  atorvastatin (LIPITOR) 10 mg tablet Take  by mouth daily.  losartan (COZAAR) 100 mg tablet Take 100 mg by mouth daily.  calcium-cholecalciferol, D3, (CALTRATE 600+D) tablet Take 1 Tab by mouth two (2) times a day.  magnesium oxide (MAG-OX) 400 mg tablet Take 400 mg by mouth daily.  Cholecalciferol, Vitamin D3, 1,000 unit cap Take 1,000 Units by mouth two (2) times a day.  DULERA 200-5 mcg/actuation HFA inhaler Take 2 Puffs by inhalation two (2) times a day.  glucosamine-chondroit-vit c-mn (GLUCOSAMINE 1500 COMPLEX) 500-400 mg capsule Take 2 Caps by mouth nightly.  montelukast (SINGULAIR) 10 mg tablet Take 10 mg by mouth daily.  esomeprazole (NEXIUM) 40 mg capsule Take  by mouth daily.  cetirizine (ZYRTEC) 10 mg tablet Take  by mouth daily.             ALLERGIES    Allergies   Allergen Reactions    Cottonseed Cough    Lamisil [Terbinafine] Rash    Malt Extract Cough and Other (comments)     Asthma         PHYSICAL EXAMINATION    There were no vitals taken for this visit. There is no height or weight on file to calculate BMI. General: Patient is alert, oriented x 3, not in acute distress    HEENT:   Sclerae are not injected and appear moist.  There is no alopecia. Neck is supple     Chest:  Breathing comfortably at room air. Musculoskeletal:  A comprehensive musculoskeletal exam was NOT performed for all joints of each upper and lower extremity and assessed for swelling, tenderness and range of motion.       Previous Exam  Bilateral Armando and Heberden nodes  Bilateral knee crepitus without effusion with patellar laxity and crepitus    Z-Deformities:   no  Donaldson Neck Deformities:  no  Boutonierre's Deformities:  no  Ulnar Deviation:   yes  MCP Subluxation:  no    Joint Count 1/24/2020 10/24/2019 7/23/2019 4/23/2019 1/21/2019   Patient pain (0-100) 5 10 5 5 10   MHAQ 0.25 0.5 0.5 0.25 0.5   Left wrist- Tender 1 1 1 - -   Left wrist- Swollen 0 1 0 - -   Left 1st MCP - Tender - 1 1 1 1   Left 1st MCP - Swollen - 1 1 1 1   Left 2nd MCP - Tender 1 0 1 1 1   Left 2nd MCP - Swollen 1 1 1 1 1   Left 3rd MCP - Tender 1 1 1 1 1   Left 3rd MCP - Swollen - 1 1 1 1   Left 4th MCP - Tender - - 1 - -   Left 4th MCP - Swollen - - 0 - -   Left 5th MCP - Tender - - 1 - 1   Left 5th MCP - Swollen - - 1 - 0   Left 2nd PIP - Tender - - 1 1 1   Left 2nd PIP - Swollen - - 1 0 1   Left 3rd PIP - Tender 1 - 1 1 1   Left 3rd PIP - Swollen 1 - 1 1 1   Left 4th PIP - Tender - - 1 - 1   Left 4th PIP - Swollen - - 1 - -   Left 5th PIP - Tender - - 1 - -   Left 5th PIP - Swollen - - 0 - -   Right elbow - Tender - - - - 1   Right elbow - Swollen - - - - 0   Right wrist- Tender - 1 0 1 -   Right wrist- Swollen - 0 1 1 -   Right 1st MCP - Tender 1 1 0 1 1   Right 1st MCP - Swollen 1 1 1 1 1   Right 2nd MCP - Tender - - 0 1 1   Right 2nd MCP - Swollen - - 1 0 1   Right 3rd MCP - Tender 1 1 0 1 1   Right 3rd MCP - Swollen 1 1 1 1 1   Right 4th MCP - Tender 1 1 - 1 1   Right 4th MCP - Swollen 1 0 - 1 1   Right 5th MCP - Tender 0 - 0 1 1   Right 5th MCP - Swollen 1 - 1 1 1   Right 2nd PIP - Tender 0 1 1 1 1   Right 2nd PIP - Swollen 1 1 1 1 1   Right 3rd PIP - Tender 1 1 1 1 1   Right 3rd PIP - Swollen 1 1 1 1 1   Right 4th PIP - Tender 1 1 1 1 1   Right 4th PIP - Swollen 1 1 1 1 1   Right 5th PIP - Tender - - - - 1   Right 5th PIP - Swollen - - - - 1   Tender Joint Count (Total) 9 10 13 14 17   Swollen Joint Count (Total) 9 9 15 12 14   Physician Assessment (0-10) 3 3 5 4 5   Patient Assessment (0-10) 0.5 1 5.5 0.5 2   CDAI Total (calculated) 21.5 23 38.5 30.5 38     DATA REVIEW    Laboratory     Recent laboratory results were reviewed, summarized, and discussed with the patient. Imaging    Musculoskeletal Ultrasound    None    Radiographs    Bilateral Hand 1/21/2019: RIGHT: osteopenia. No acute fracture or dislocation. Radiocarpal and intercarpal joints are age-appropriate. No erosion of intercarpal or CMC joints. First MCP joint space narrowing is increased. There are small marginal osteophytes. No erosion. Anterior subluxation of the third MCP joint and joint space narrowing are increased. There are are increased subtle erosions of the third metacarpal head. Fifth MCP joint erosion is unchanged. Third PIP joint space narrowing is mild and unchanged. The DIP joints are within normal limits. LEFT: osteopenia. No acute fracture or dislocation. Mild anterior subluxation of the third MCP joint is increased. Radiocarpal and intercarpal joints are within normal limits. First CMC joint osteoarthritis is mild and new. Third MCP joint space narrowing and subtle erosions are increased. First MCP joint osteoarthritis is mild and new. IP joints are within normal limits. No periosteal reaction or chondrocalcinosis. Bilateral Foot 1/21/2019: RIGHT: osteopenia. No acute fracture or dislocation. Intertarsal and TMT joints are within normal limits. Hallux valgus angulation measures 45 degrees.  Mild first MTP joint osteoarthritis. MTS joint subluxation and arthritis are partially imaged. Remaining MTP joints are within normal limits. IP joints are difficult to visualize given the toe positioning. There are soft tissue calcifications adjacent to the distal tibia. LEFT: osteopenia. No acute fracture or dislocation. Moderate plantar calcaneal spur. Mild talonavicular joint osteoarthritis. Possible third TMT joint osteoarthritis. Hallux valgus angulation measures 40 degrees. Mild first MTP joint osteoarthritis. MTS joint subluxation and arthritis are partially imaged. Mild third PIP and DIP joint osteoarthritis. No evidence of IP joint erosion. Soft tissue calcifications are adjacent to the distal tibia. No periosteal reaction or chondrocalcinosis. Bilateral Hand 11/25/2015: RIGHT: no acute fracture or dislocation. Alignment is anatomic. Severe joint space narrowing is seen at the third MCP joint. Small erosions are suspected in the head of the third metacarpal. Mild joint space narrowing is seen in the first MCP joint. Generalized osteopenia is noted. Soft tissue swelling surrounds the third MCP joint. LEFT: no acute fracture or dislocation. Alignment is anatomic. Severe joint space is seen at the third  MCP joint mild joint space. Mild joint space narrowing is seen at the first MCP joint. No erosions are seen. Generalized osteopenia is noted. There is the suggestion of mild soft tissue swelling surrounding the third MCP joint. CT Imaging    CT Chest without contrast 1/16/2017: The thoracic inlet is unremarkable. No mediastinal nor hilar masses nor adenopathy. No thoracic aortic aneurysm. The central airways are patent. Minimal hypoventilation is appreciated panel placed. The lungs are otherwise clear. There is diffuse low attenuation within the liver parenchyma. The remaining visualized upper abdominal viscera are unremarkable. There are no aggressive appearing osseous lesions.   Degenerative changes within the mid thoracic spine. MR Imaging    None    DXA      DXA 11/01/2016: (excluded L1, right hip, distal radius) lumbar spine L1-L3 T score 1.5 (BMD 1.183 g/cm2), left femoral neck T score: -0.5 (0.880 g/cm2). FRAX score 17 % probability in 10 years for major osteoporotic fracture and 3.7 % 10 year probability of hip fracture. ASSESSMENT AND PLAN    This is a follow-up visit for Ms. Francis. 1) Seronegative Erosive Rheumatoid Arthritis. She is maintained on methotrexate 25 mg every Wednesday and Humira 40 mg every Wedneday with good tolerance and feels well. Her CDAI was previously 21.5 (previously 23, 38.5, 30.5, 38) with 9 tender and 9 swollen joints, consistent with high disease activity. I will continue treatment. I refilled methotrexate. She had labs drawn yesterday at her PCP's. I asked for those labs. Follow up in 6 months. 2) Long Term Use of Immunosuppressants. The patient remains on immunomodulatory medications (methotrexate, Humira) and requires frequent toxicity monitoring by blood work. I asked her to send me Dr. Don Felty labs from yesterday. 3) Bilateral Patellofemoral Arthritis. This was no long an active issue. I had referred her to physical therapy, which helped. 4) Bilateral Knee Osteoarthritis. This was no longer an active issue. 5) Bilateral Hand Osteoarthritis. I had recommended ball squeezing and holding until hand fatigue then alternating to other hand exercises 10 times twice daily. 6) Chronic Back Pain. She has done physical therapy which helped, but still reports difficulty standing upright due to low back pain and limitation in walking distances due to low back pain. She now follows with Dr. Grecia Burns who performed an epidural with minimal relief. She is contemplating surgery. 7) Long Term Use of Bisphosphonates. She reports taking Fosamax for around 5 years or more.  Juliet Nim al reported that the risk of atypical femoral fractures is low, with an incidence of up to 50 per 100,000 person-years during the first 5 years of bisphosphonate use, resulting in a clear positive benefit/risk ratio within this time frame (J Bone Haralson Res. 2016 Jan;31(1):16-35). I asked her to send me her most recent DXA with Dr. Sahara Verdugo. The patient voiced understanding of the aforementioned assessment and plan. The patient has consented for synchronous (real-time) Telemedicine (audio-video technology) on 10/23/2020 for their care to be delivered over telemedicine in place of their regularly scheduled office visit pursuant to the emergency declaration under the [address] [place] [address], [facility] waiver authority and the [employer] Act, this Virtual  Visit was conducted, with patient's consent, to reduce the patient's risk of exposure to COVID-19 and provide continuity of care for an established patient. Services were provided through a video synchronous discussion virtually to substitute for in-person clinic visit. TODAY'S ORDERS    Orders Placed This Encounter    methotrexate 25 mg/mL chemo injection     No future appointments.      Katelyn Schultz MD, [address] [place] [address]    Adult Rheumatology   Rheumatology Ultrasound Certified  [address]  [place], [address] [facility]   Phone [phone]  Fax [phone]

## 2020-12-09 ENCOUNTER — HOSPITAL ENCOUNTER (OUTPATIENT)
Dept: PREADMISSION TESTING | Age: 75
Discharge: HOME | End: 2020-12-09
Payer: COMMERCIAL

## 2020-12-09 VITALS
DIASTOLIC BLOOD PRESSURE: 82 MMHG | SYSTOLIC BLOOD PRESSURE: 178 MMHG | RESPIRATION RATE: 18 BRPM | BODY MASS INDEX: 40.12 KG/M2 | WEIGHT: 212.5 LBS | HEIGHT: 61 IN | HEART RATE: 81 BPM | TEMPERATURE: 98 F | OXYGEN SATURATION: 99 %

## 2020-12-09 LAB
25(OH)D3 SERPL-MCNC: 57.6 NG/ML (ref 30–100)
ALBUMIN SERPL-MCNC: 3.8 G/DL (ref 3.5–5)
ALBUMIN/GLOB SERPL: 1.3 {RATIO} (ref 1.1–2.2)
ALP SERPL-CCNC: 156 U/L (ref 45–117)
ALT SERPL-CCNC: 30 U/L (ref 12–78)
ANION GAP SERPL CALC-SCNC: 4 MMOL/L (ref 5–15)
APPEARANCE UR: CLEAR
APTT PPP: 24.2 SEC (ref 22.1–31)
AST SERPL-CCNC: 15 U/L (ref 15–37)
ATRIAL RATE: 82 BPM
BACTERIA URNS QL MICRO: NEGATIVE /HPF
BASOPHILS # BLD: 0 K/UL (ref 0–0.1)
BASOPHILS NFR BLD: 0 % (ref 0–1)
BILIRUB SERPL-MCNC: 0.8 MG/DL (ref 0.2–1)
BILIRUB UR QL: NEGATIVE
BUN SERPL-MCNC: 15 MG/DL (ref 6–20)
BUN/CREAT SERPL: 17 (ref 12–20)
CALCIUM SERPL-MCNC: 9.9 MG/DL (ref 8.5–10.1)
CALCULATED P AXIS, ECG09: 52 DEGREES
CALCULATED R AXIS, ECG10: 4 DEGREES
CALCULATED T AXIS, ECG11: 22 DEGREES
CHLORIDE SERPL-SCNC: 109 MMOL/L (ref 97–108)
CO2 SERPL-SCNC: 27 MMOL/L (ref 21–32)
COLOR UR: ABNORMAL
CREAT SERPL-MCNC: 0.88 MG/DL (ref 0.55–1.02)
DIAGNOSIS, 93000: NORMAL
DIFFERENTIAL METHOD BLD: ABNORMAL
EOSINOPHIL # BLD: 0.2 K/UL (ref 0–0.4)
EOSINOPHIL NFR BLD: 2 % (ref 0–7)
EPITH CASTS URNS QL MICRO: ABNORMAL /LPF
ERYTHROCYTE [DISTWIDTH] IN BLOOD BY AUTOMATED COUNT: 20.8 % (ref 11.5–14.5)
EST. AVERAGE GLUCOSE BLD GHB EST-MCNC: 123 MG/DL
GLOBULIN SER CALC-MCNC: 3 G/DL (ref 2–4)
GLUCOSE SERPL-MCNC: 91 MG/DL (ref 65–100)
GLUCOSE UR STRIP.AUTO-MCNC: NEGATIVE MG/DL
HBA1C MFR BLD: 5.9 % (ref 4–5.6)
HCT VFR BLD AUTO: 33.6 % (ref 35–47)
HGB BLD-MCNC: 10 G/DL (ref 11.5–16)
HGB UR QL STRIP: NEGATIVE
IMM GRANULOCYTES # BLD AUTO: 0 K/UL (ref 0–0.04)
IMM GRANULOCYTES NFR BLD AUTO: 0 % (ref 0–0.5)
INR PPP: 1 (ref 0.9–1.1)
KETONES UR QL STRIP.AUTO: NEGATIVE MG/DL
LEUKOCYTE ESTERASE UR QL STRIP.AUTO: ABNORMAL
LYMPHOCYTES # BLD: 1.9 K/UL (ref 0.8–3.5)
LYMPHOCYTES NFR BLD: 20 % (ref 12–49)
MCH RBC QN AUTO: 25.4 PG (ref 26–34)
MCHC RBC AUTO-ENTMCNC: 29.8 G/DL (ref 30–36.5)
MCV RBC AUTO: 85.3 FL (ref 80–99)
MONOCYTES # BLD: 1 K/UL (ref 0–1)
MONOCYTES NFR BLD: 10 % (ref 5–13)
NEUTS SEG # BLD: 6.5 K/UL (ref 1.8–8)
NEUTS SEG NFR BLD: 68 % (ref 32–75)
NITRITE UR QL STRIP.AUTO: NEGATIVE
NRBC # BLD: 0 K/UL (ref 0–0.01)
NRBC BLD-RTO: 0 PER 100 WBC
P-R INTERVAL, ECG05: 160 MS
PH UR STRIP: 6.5 [PH] (ref 5–8)
PLATELET # BLD AUTO: 355 K/UL (ref 150–400)
PMV BLD AUTO: 9.9 FL (ref 8.9–12.9)
POTASSIUM SERPL-SCNC: 5.4 MMOL/L (ref 3.5–5.1)
PROT SERPL-MCNC: 6.8 G/DL (ref 6.4–8.2)
PROT UR STRIP-MCNC: NEGATIVE MG/DL
PROTHROMBIN TIME: 10.5 SEC (ref 9–11.1)
Q-T INTERVAL, ECG07: 384 MS
QRS DURATION, ECG06: 124 MS
QTC CALCULATION (BEZET), ECG08: 448 MS
RBC # BLD AUTO: 3.94 M/UL (ref 3.8–5.2)
RBC #/AREA URNS HPF: ABNORMAL /HPF (ref 0–5)
RBC MORPH BLD: ABNORMAL
SODIUM SERPL-SCNC: 140 MMOL/L (ref 136–145)
SP GR UR REFRACTOMETRY: <1.005 (ref 1–1.03)
THERAPEUTIC RANGE,PTTT: NORMAL SECS (ref 58–77)
UA: UC IF INDICATED,UAUC: ABNORMAL
UROBILINOGEN UR QL STRIP.AUTO: 0.2 EU/DL (ref 0.2–1)
VENTRICULAR RATE, ECG03: 82 BPM
WBC # BLD AUTO: 9.6 K/UL (ref 3.6–11)
WBC URNS QL MICRO: ABNORMAL /HPF (ref 0–4)

## 2020-12-09 PROCEDURE — 86923 COMPATIBILITY TEST ELECTRIC: CPT

## 2020-12-09 PROCEDURE — 81001 URINALYSIS AUTO W/SCOPE: CPT

## 2020-12-09 PROCEDURE — 80053 COMPREHEN METABOLIC PANEL: CPT

## 2020-12-09 PROCEDURE — 85610 PROTHROMBIN TIME: CPT

## 2020-12-09 PROCEDURE — 93005 ELECTROCARDIOGRAM TRACING: CPT

## 2020-12-09 PROCEDURE — 36415 COLL VENOUS BLD VENIPUNCTURE: CPT

## 2020-12-09 PROCEDURE — 86900 BLOOD TYPING SEROLOGIC ABO: CPT

## 2020-12-09 PROCEDURE — 85730 THROMBOPLASTIN TIME PARTIAL: CPT

## 2020-12-09 PROCEDURE — 85025 COMPLETE CBC W/AUTO DIFF WBC: CPT

## 2020-12-09 PROCEDURE — 83036 HEMOGLOBIN GLYCOSYLATED A1C: CPT

## 2020-12-09 PROCEDURE — 82306 VITAMIN D 25 HYDROXY: CPT

## 2020-12-09 RX ORDER — ALBUTEROL SULFATE 90 UG/1
1 AEROSOL, METERED RESPIRATORY (INHALATION) AS NEEDED
COMMUNITY
End: 2021-07-27 | Stop reason: ALTCHOICE

## 2020-12-09 RX ORDER — CHOLECALCIFEROL (VITAMIN D3) 50 MCG
CAPSULE ORAL DAILY
COMMUNITY

## 2020-12-09 NOTE — PERIOP NOTES
[address] [facility] [address], [address] [facility]   MAIN OR                                  [phone]   MAIN PRE OP                          [phone]                                                                                AMBULATORY PRE OP          [phone]  PRE-ADMISSION TESTING    [phone]   Surgery Date:Tuesday, 12/15/20       Is surgery arrival time given by surgeon? YES  NO  If NO, [facility] staff will call you between 3 and 7pm the day before your surgery with your arrival time. (If your surgery is on a Monday, we will call you the Friday before.)    Call [phone] after 7pm Monday-Friday if you did not receive this call. INSTRUCTIONS BEFORE YOUR SURGERY   When You  Arrive Arrive at the 2nd [address] [facility] on the day of your surgery  Have your insurance card, photo ID, and any copayment (if needed)   Food   and   Drink NO food or drink after midnight the night before surgery    This means NO water, gum, mints, coffee, juice, etc.  No alcohol (beer, wine, liquor) 24 hours before and after surgery   Medications to   TAKE   Morning of Surgery MEDICATIONS TO TAKE THE MORNING OF SURGERY WITH A SIP OF WATER:    Olopatadine Nasal Spray   Dulera Inhaler   Zyrtec   Atrovastatin   Albuterol Inhailer if needed, bring with you on day of surgery    Do NOT take Losartan on AM of surgery. Medications  To  STOP      7 days before surgery  Non-Steroidal anti-inflammatory Drugs (NSAID's): for example, Ibuprofen (Advil, Motrin), Naproxen (Aleve)   Aspirin, if taking for pain    Herbal supplements, vitamins, and fish oil   Other:  (Pain medications not listed above, including Tylenol may be taken)   Blood  Thinners  If you take  Aspirin, Plavix, Coumadin, or any blood-thinning or anti-blood clot medicine, talk to the doctor who prescribed the medications for pre-operative instructions.    [facility]  If you shower the morning of surgery, please do not apply anything to your skin (lotions, powders, deodorant, or makeup, especially mascara)   Follow Chlorhexidine Care Fusion body wash instructions provided to you during PAT appointment. Begin 3 days prior to surgery.  Do not shave or trim anywhere 24 hours before surgery   Wear your hair loose or down; no pony-tails, buns, or metal hair clips   Wear loose, comfortable, clean clothes   Wear glasses instead of contacts   Leave money, valuables, and jewelry, including body piercings, at home   Going Home - or Spending the Night  SAME-DAY SURGERY: You must have a responsible adult drive you home and stay with you 24 hours after surgery   ADMITS: If your doctor is keeping you in the hospital after surgery, leave personal belongings/luggage in your car until you have a hospital room number. Bring your CPAP with you to hospital on day of surgery. Hospital discharge time is 12 noon  Drivers must be here before 12 noon unless you are told differently   Special Instructions   Special Instructions:  · Use Chlorhexidine Care Fusion wash and sponges 3 days prior to surgery as instructed. · Incentive spirometer given with instructions to practice at home and bring back to the hospital on the day of surgery. · [facility] will contact you if your Hemoglobin A1C is greater than 7.5. · Ensure/Glucerna  sample, nutritional information, and Ensure/Glucerna coupon given. · Pain pamphlet and Call Don't Fall reminder reviewed with patient. ·  parking is complimentary Monday - Friday 7 am - 5 pm  · Bring PTA Medication list day of surgery with the last doses taken documented  It is now mandated that all surgical patients be tested for COVID-19 prior to surgery. Testing has to be exactly 4 days prior to surgery.        Your COVID test date is Friday, 12/11/20  between 8:00 am and 11:00 am.       COVID testing will be performed curbside at the [facility] entrance. There will be signs leading you to the testing site. You will need to bring a photo ID with you to be swabbed. Patients are advised to self-quarantine at home after testing and prior to your surgery date. You will be notified if your results are positive. What to watch for:  Coronavirus (COVID-19) affects different people in different ways  It also appears with a wide range of symptoms from mild to severe  Signs usually appear 2-14 days after exposure    If you develop any of the following, notify your doctor immediately:  Fever  Chills, with or without a shiver  Muscle pain  Headache  Sore throat  Dry cough  New loss of taste or smell  Tiredness     If you develop any of the following, call [phone]:  Shortness of breath  Difficulty breathing  Chest pain  New confusion  Blueness of fingers and/or lips     Follow all instructions so your surgery wont be cancelled. Please, be on time. If a situation occurs and you are delayed the day of surgery, call [phone]     If your physical condition changes (like a fever, cold, flu, etc.) call your surgeon. Home medication(s) reviewed and verified via     LIST     during PAT appointment. The patient was contacted by     IN-PERSON  The patient verbalizes understanding of all instructions and   DOES NOT   need reinforcement.

## 2020-12-09 NOTE — H&P
Preoperative Evaluation                     History and Physical with Surgical Risk Stratification     12/9/2020    CC: Low back pain  Surgery: L2-S1 Laminectomy     HPI:   Arthur Plummer is a 70 y.o. female referred for pre-operative evaluation by Dr. Rosalia Charles for surgery on 12/15/20. Ms. Martin Milton states she has had long standing back pain. She started seeing her PCP for her knee and after many rounds of PT her therapist mentioned she needed to get her back looked at. She notes her biggest problem is she cannot stand up straight. She has numbness in her knee if she stands too long. Her back pain is constant. She does do chair fitness three times a week to stay strong. Denies falls. Pain today is 2/10. She notes she has a wound to her left lower shin from where she hit it on a piece of wood. She had stitches but notes it is still draining a month later. The patient was evaluated in the surgeon's office and it was determined that the most appropriate plan of care is to proceed with surgical intervention. Patient's PCP Gloria Harp MD    Review of Systems     Constitutional: Negative for chills and fever  HENT: Negative for congestion and sore throat  Eyes: negative for blurred vision and double vision  Respiratory: Negative for cough, shortness of breath and wheezing  Mouth: Negative for loose, broken or chipped teeth. Cardiovascular: Negative for chest pain and palpitations  Gastrointestinal: Negative for abdominal pain, constipation, diarrhea and nausea  Genitourinary: Negative for dysuria and hematuria  Musculoskeletal: Mild low back pain  Skin: Wound to left lower shin  Neurological: Negative for dizziness, tremors and headaches  Psychiatric: Negative for depression. The patient is not nervous/anxious.     Inherent Risk of Surgery     Surgical risk:  Intermediate  Low:  EIntermediate:   Spinal surgery, High:    Patient Cardiac Risk Assessment     Revised Cardiac Risk Index (RCRI)    Rate if cardiac death, nonfatal MI, nonfatal cardiac arrest by number of risk factor- 0.4%    DORINDA/AHA 2007 Guidelines:   1) Surgery Emergency, Non-cardiac -> to surgery  2) If not, look at clinical predictors    Major Intermediate Minor   Abnormal EKGDiabetes    Blood Thinner: NA    METS     >4 METS Climb a flight of stairs or a hill Walk on level ground at 4 mph Run a short distance Heavy work around house (scrub floors, move furniture)     Other Risk Factors:   Screening for ETOH use:  Done and low risk  Smoking status:  Never     Personal or FH of bleeding problems:  No  Personal or FH of blood clots:  No  Personal or FH of anesthesia problems:   No    Pulmonary Risk:  Asthma or COPD:  Yes  Body mass index is 40.15 kg/m². Known PAUL:  Yes  Albumin normal, BUN normal    Past Medical, Surgical, Social History     Allergies: Allergies   Allergen Reactions    Doxycycline Itching    Cottonseed Cough    Lamisil [Terbinafine] Rash    Malt Extract Cough              Medication Documentation Review Audit     Reviewed by Barbara Solomon RN (Registered Nurse) on 12/09/20 at [phone]    Medication Sig Documenting Provider Last Dose Status Taking? adalimumab (Humira,CF, Pen) 40 mg/0.4 mL injection pen 0.4 mL by SubCUTAneous route every Wednesday. Indications: rheumatoid arthritis Wayne Hernandez MD 11/25/2020 Active No           Med Note (Page Xiomy A   Wed Dec 9, 2020 11:48 AM)     albuterol (PROVENTIL HFA, VENTOLIN HFA, PROAIR HFA) 90 mcg/actuation inhaler Take 1 Puff by inhalation as needed for Wheezing. Used as rescue inhaler Provider, Historical  Active Yes   aspirin delayed-release 81 mg tablet Take  by mouth daily. Provider, Historical 11/25/2020 Active No   atorvastatin (LIPITOR) 10 mg tablet Take  by mouth daily. Provider, Historical  Active Yes   B.infantis-B.ani-B.long-B.bifi (Probiotic 4X) 10-15 mg TbEC Take  by mouth daily.  Provider, Historical  Active Yes   calcium-cholecalciferol, D3, (CALTRATE 600+D) tablet Take 1 Tab by mouth two (2) times a day. Provider, Historical 11/25/2020 Active No   cetirizine (ZYRTEC) 10 mg tablet Take  by mouth daily. Provider, Historical  Active Yes   Cholecalciferol, Vitamin D3, 1,000 unit cap Take 1,000 Units by mouth two (2) times a day. Provider, Historical  Active Yes   DULERA 200-5 mcg/actuation HFA inhaler Take 2 Puffs by inhalation two (2) times a day. Provider, Historical  Active Yes   esomeprazole (NEXIUM) 40 mg capsule Take  by mouth nightly. Provider, Historical  Active    folic acid (FOLVITE) 1 mg tablet Take 1 Tab by mouth daily. Austin Ge MD 11/25/2020 Active No   glucosamine-chondroit-vit c-mn (GLUCOSAMINE 1500 COMPLEX) 500-400 mg capsule Take 2 Caps by mouth nightly. Provider, Historical  Active    liraglutide (SAXENDA SC) 0.06 mg by SubCUTAneous route nightly. Self inject Provider, Historical  Active Yes   losartan (COZAAR) 100 mg tablet Take 100 mg by mouth daily. Provider, Historical  Active    magnesium oxide (MAG-OX) 400 mg tablet Take 400 mg by mouth daily. Provider, Historical 11/25/2020 Active No   methotrexate 25 mg/mL chemo injection 1 mL by SubCUTAneous route every Wednesday. Austin Ge MD 11/25/2020 Active No   montelukast (SINGULAIR) 10 mg tablet Take 10 mg by mouth nightly. Provider, Historical  Active Yes   olopatadine (PATANASE) 0.6 % spry two (2) times a day.  Provider, Historical  Active Yes   OTHER Methycellulose 500mg PO daily Provider, Historical  Active Yes              Past Medical History:   Diagnosis Date    Asthma     Chronic sinusitis     Fibromyalgia     GERD (gastroesophageal reflux disease)     Hypertension     Leg wound, left 11/1/20 to present    Morbid obesity with BMI of 40.0-44.9, adult ([facility] [address] 12/09/2020    Multiple allergies     Obstructive sleep apnea on CPAP     Osteopenia     RA (rheumatoid arthritis) ([facility] [address]      Past Surgical History:   Procedure Laterality Date    HX CARPAL TUNNEL RELEASE Right 2006         HX CARPAL TUNNEL RELEASE Left 2014    HX HERNIA REPAIR  2015    HX ROTATOR CUFF REPAIR Right          HX SEPTOPLASTY  2012    HX TONSILLECTOMY       Social History     Tobacco Use    Smoking status: Never Smoker    Smokeless tobacco: Never Used   Substance Use Topics    Alcohol use: No     Alcohol/week: 0.0 standard drinks    Drug use: No     Family History   Problem Relation Age of Onset    Diabetes Mother     Coronary Artery Disease Father     Anesth Problems Neg Hx     Clotting Disorder Neg Hx        Objective     Vitals:    12/09/20 1123   BP: (!) 178/82   Pulse: 81   Resp: 18   Temp: 98 °F (36.7 °C)   SpO2: 99%   Weight: 96.4 kg (212 lb 8 oz)   Height: 5' 1\" (1.549 m)       Constitutional:  Appears well,  No Acute Distress, Vitals noted  Psychiatric:   Affect normal, Alert and Oriented to person/place/time    Eyes:   Pupils equally round and reactive, EOMI, conjunctiva clear, eyelids normal  ENT:   External ears and nose normal, teeth normal, gums normal, TMs and Orophyarynx normal  Neck:   General inspection and Thyroid normal.  No abnormal cervical or supraclavicular nodes    Lungs:   Clear to auscultation, good respiratory effort  Heart: Ausculation normal.  Regular rhythm. No cardiac murmurs.   No carotid bruits or palpable thrills  Chest wall normal  Musculoskeletal: Gait antalgic  Extremities:   Without edema, good peripheral pulses  Skin:   See picture          Recent Results (from the past 72 hour(s))   CBC WITH AUTOMATED DIFF    Collection Time: 12/09/20 12:22 PM   Result Value Ref Range    WBC 9.6 3.6 - 11.0 K/uL    RBC 3.94 3.80 - 5.20 M/uL    HGB 10.0 (L) 11.5 - 16.0 g/dL    HCT 33.6 (L) 35.0 - 47.0 %    MCV 85.3 80.0 - 99.0 FL    MCH 25.4 (L) 26.0 - 34.0 PG    MCHC 29.8 (L) 30.0 - 36.5 g/dL    RDW 20.8 (H) 11.5 - 14.5 %    PLATELET 300 254 - 764 K/uL    MPV 9.9 8.9 - 12.9 FL    NRBC 0.0 0  WBC    ABSOLUTE NRBC 0.00 0.00 - 0.01 K/uL    NEUTROPHILS 68 32 - 75 %    LYMPHOCYTES 20 12 - 49 %    MONOCYTES 10 5 - 13 %    EOSINOPHILS 2 0 - 7 %    BASOPHILS 0 0 - 1 %    IMMATURE GRANULOCYTES 0 0.0 - 0.5 %    ABS. NEUTROPHILS 6.5 1.8 - 8.0 K/UL    ABS. LYMPHOCYTES 1.9 0.8 - 3.5 K/UL    ABS. MONOCYTES 1.0 0.0 - 1.0 K/UL    ABS. EOSINOPHILS 0.2 0.0 - 0.4 K/UL    ABS. BASOPHILS 0.0 0.0 - 0.1 K/UL    ABS. IMM. GRANS. 0.0 0.00 - 0.04 K/UL    DF SMEAR SCANNED      RBC COMMENTS ANISOCYTOSIS  1+        RBC COMMENTS HYPOCHROMIA  1+        RBC COMMENTS MICROCYTOSIS  1+        RBC COMMENTS OVALOCYTES  PRESENT       METABOLIC PANEL, COMPREHENSIVE    Collection Time: 12/09/20 12:22 PM   Result Value Ref Range    Sodium 140 136 - 145 mmol/L    Potassium 5.4 (H) 3.5 - 5.1 mmol/L    Chloride 109 (H) 97 - 108 mmol/L    CO2 27 21 - 32 mmol/L    Anion gap 4 (L) 5 - 15 mmol/L    Glucose 91 65 - 100 mg/dL    BUN 15 6 - 20 MG/DL    Creatinine 0.88 0.55 - 1.02 MG/DL    BUN/Creatinine ratio 17 12 - 20      GFR est AA >60 >60 ml/min/1.73m2    GFR est non-AA >60 >60 ml/min/1.73m2    Calcium 9.9 8.5 - 10.1 MG/DL    Bilirubin, total 0.8 0.2 - 1.0 MG/DL    ALT (SGPT) 30 12 - 78 U/L    AST (SGOT) 15 15 - 37 U/L    Alk.  phosphatase 156 (H) 45 - 117 U/L    Protein, total 6.8 6.4 - 8.2 g/dL    Albumin 3.8 3.5 - 5.0 g/dL    Globulin 3.0 2.0 - 4.0 g/dL    A-G Ratio 1.3 1.1 - 2.2     HEMOGLOBIN A1C WITH EAG    Collection Time: 12/09/20 12:22 PM   Result Value Ref Range    Hemoglobin A1c 5.9 (H) 4.0 - 5.6 %    Est. average glucose 123 mg/dL   CULTURE, MRSA    Collection Time: 12/09/20 12:22 PM    Specimen: Nares; Nasal   Result Value Ref Range    Special Requests: NO SPECIAL REQUESTS      Culture result: MRSA NOT PRESENT AT 13 HOURS     PROTHROMBIN TIME + INR    Collection Time: 12/09/20 12:22 PM   Result Value Ref Range    INR 1.0 0.9 - 1.1      Prothrombin time 10.5 9.0 - 11.1 sec   PTT    Collection Time: 12/09/20 12:22 PM   Result Value Ref Range    aPTT 24.2 22.1 - 31.0 sec    aPTT, therapeutic range     58.0 - 77.0 SECS URINALYSIS W/ REFLEX CULTURE    Collection Time: 12/09/20 12:22 PM    Specimen: Urine   Result Value Ref Range    Color YELLOW/STRAW      Appearance CLEAR CLEAR      Specific gravity <1.005 1.003 - 1.030    pH (UA) 6.5 5.0 - 8.0      Protein Negative NEG mg/dL    Glucose Negative NEG mg/dL    Ketone Negative NEG mg/dL    Bilirubin Negative NEG      Blood Negative NEG      Urobilinogen 0.2 0.2 - 1.0 EU/dL    Nitrites Negative NEG      Leukocyte Esterase SMALL (A) NEG      WBC 0-4 0 - 4 /hpf    RBC 0-5 0 - 5 /hpf    Epithelial cells FEW FEW /lpf    Bacteria Negative NEG /hpf    UA:UC IF INDICATED CULTURE NOT INDICATED BY UA RESULT CNI     VITAMIN D, 25 HYDROXY    Collection Time: 12/09/20 12:22 PM   Result Value Ref Range    Vitamin D 25-Hydroxy 57.6 30 - 100 ng/mL   TYPE & SCREEN    Collection Time: 12/09/20 12:22 PM   Result Value Ref Range    Crossmatch Expiration 12/18/2020,2359     ABO/Rh(D) Monique Elam NEGATIVE     Antibody screen NEG    EKG, 12 LEAD, INITIAL    Collection Time: 12/09/20 12:38 PM   Result Value Ref Range    Ventricular Rate 82 BPM    Atrial Rate 82 BPM    P-R Interval 160 ms    QRS Duration 124 ms    Q-T Interval 384 ms    QTC Calculation (Bezet) 448 ms    Calculated P Axis 52 degrees    Calculated R Axis 4 degrees    Calculated T Axis 22 degrees    Diagnosis       Normal sinus rhythm  Right bundle branch block  Abnormal ECG  No previous ECGs available  Confirmed by Bella Reyes MD., --- ([id number]) on 12/9/2020 4:34:56 PM         Assessment and Plan     Assessment/Plan:   1) Lumbar Stenosis  2) Pre-Operative Evaluation    Labs and EKG reviewed. MRSA pending    Anemia: I have sent text message to surgeon about Hgb of 10.0. Also sent to PCP    Wound: I have asked her to stop by her PCP office today and get her leg looked at. This wound is a month old and had stitches taken out. Still draining.  Surgeon notified    Preoperative Clearance  Per RCRI, the patient has a 0.4% risk of cardiac death, nonfatal MI, nonfatal cardiac arrest based on no risk factors. Per ACC/AHA guidelines, patient is intermediate risk for a(n) intermediate risk surgery and may proceed to planned surgery with the above noted risk.     Rip Flor NP

## 2020-12-10 LAB
BACTERIA SPEC CULT: NORMAL
BACTERIA SPEC CULT: NORMAL
SERVICE CMNT-IMP: NORMAL

## 2020-12-11 ENCOUNTER — HOSPITAL ENCOUNTER (OUTPATIENT)
Dept: PREADMISSION TESTING | Age: 75
Discharge: HOME | End: 2020-12-11
Payer: COMMERCIAL

## 2020-12-11 PROCEDURE — 87635 SARS-COV-2 COVID-19 AMP PRB: CPT

## 2020-12-13 LAB — SARS-COV-2, COV2NT: NOT DETECTED

## 2020-12-14 ENCOUNTER — ANESTHESIA EVENT (OUTPATIENT)
Dept: SURGERY | Age: 75
DRG: 454 | End: 2020-12-14
Payer: COMMERCIAL

## 2020-12-15 ENCOUNTER — ANESTHESIA (OUTPATIENT)
Dept: SURGERY | Age: 75
DRG: 454 | End: 2020-12-15
Payer: COMMERCIAL

## 2020-12-15 ENCOUNTER — APPOINTMENT (OUTPATIENT)
Dept: GENERAL RADIOLOGY | Age: 75
DRG: 454 | End: 2020-12-15
Attending: ORTHOPAEDIC SURGERY
Payer: COMMERCIAL

## 2020-12-15 ENCOUNTER — HOSPITAL ENCOUNTER (INPATIENT)
Age: 75
LOS: 6 days | Discharge: HOME HEALTH | DRG: 454 | End: 2020-12-21
Attending: ORTHOPAEDIC SURGERY | Admitting: ORTHOPAEDIC SURGERY
Payer: COMMERCIAL

## 2020-12-15 DIAGNOSIS — I87.2 VENOUS INSUFFICIENCY OF BOTH LOWER EXTREMITIES: ICD-10-CM

## 2020-12-15 DIAGNOSIS — E66.01 SEVERE OBESITY (HCC): ICD-10-CM

## 2020-12-15 DIAGNOSIS — R60.0 BILATERAL LOWER EXTREMITY EDEMA: ICD-10-CM

## 2020-12-15 DIAGNOSIS — Z79.60 LONG-TERM USE OF IMMUNOSUPPRESSANT MEDICATION: ICD-10-CM

## 2020-12-15 DIAGNOSIS — M19.041 PRIMARY OSTEOARTHRITIS OF BOTH HANDS: ICD-10-CM

## 2020-12-15 DIAGNOSIS — M06.09 SERONEGATIVE RHEUMATOID ARTHRITIS OF MULTIPLE SITES (HCC): ICD-10-CM

## 2020-12-15 DIAGNOSIS — Z98.1 STATUS POST LUMBAR SPINAL FUSION: ICD-10-CM

## 2020-12-15 DIAGNOSIS — M48.062 LUMBAR STENOSIS WITH NEUROGENIC CLAUDICATION: ICD-10-CM

## 2020-12-15 DIAGNOSIS — G89.18 POSTOPERATIVE PAIN: Primary | ICD-10-CM

## 2020-12-15 DIAGNOSIS — M19.042 PRIMARY OSTEOARTHRITIS OF BOTH HANDS: ICD-10-CM

## 2020-12-15 PROCEDURE — 01NR0ZZ RELEASE SACRAL NERVE, OPEN APPROACH: ICD-10-PCS | Performed by: ORTHOPAEDIC SURGERY

## 2020-12-15 PROCEDURE — 2709999900 HC NON-CHARGEABLE SUPPLY

## 2020-12-15 PROCEDURE — 77030018846 HC SOL IRR STRL H20 ICUM -A: Performed by: ORTHOPAEDIC SURGERY

## 2020-12-15 PROCEDURE — 74011250636 HC RX REV CODE- 250/636: Performed by: NURSE ANESTHETIST, CERTIFIED REGISTERED

## 2020-12-15 PROCEDURE — 0SG10AJ FUSION OF 2 OR MORE LUMBAR VERTEBRAL JOINTS WITH INTERBODY FUSION DEVICE, POSTERIOR APPROACH, ANTERIOR COLUMN, OPEN APPROACH: ICD-10-PCS | Performed by: ORTHOPAEDIC SURGERY

## 2020-12-15 PROCEDURE — 77030004391 HC BUR FLUT MEDT -C: Performed by: ORTHOPAEDIC SURGERY

## 2020-12-15 PROCEDURE — 2709999900 HC NON-CHARGEABLE SUPPLY: Performed by: ORTHOPAEDIC SURGERY

## 2020-12-15 PROCEDURE — 94664 DEMO&/EVAL PT USE INHALER: CPT

## 2020-12-15 PROCEDURE — 0SB20ZZ EXCISION OF LUMBAR VERTEBRAL DISC, OPEN APPROACH: ICD-10-PCS | Performed by: ORTHOPAEDIC SURGERY

## 2020-12-15 PROCEDURE — 77030005513 HC CATH URETH FOL11 MDII -B: Performed by: ORTHOPAEDIC SURGERY

## 2020-12-15 PROCEDURE — 77030003666 HC NDL SPINAL BD -A: Performed by: ORTHOPAEDIC SURGERY

## 2020-12-15 PROCEDURE — 76010000176 HC OR TIME 4.5 TO 5 HR INTENSV-TIER 1: Performed by: ORTHOPAEDIC SURGERY

## 2020-12-15 PROCEDURE — 94640 AIRWAY INHALATION TREATMENT: CPT

## 2020-12-15 PROCEDURE — 77030029684 HC NEB SM VOL KT MONA -A

## 2020-12-15 PROCEDURE — 77030040356 HC CORD BPLR FRCP COVD -A: Performed by: ORTHOPAEDIC SURGERY

## 2020-12-15 PROCEDURE — 74011250637 HC RX REV CODE- 250/637: Performed by: NURSE ANESTHETIST, CERTIFIED REGISTERED

## 2020-12-15 PROCEDURE — C1713 ANCHOR/SCREW BN/BN,TIS/BN: HCPCS | Performed by: ORTHOPAEDIC SURGERY

## 2020-12-15 PROCEDURE — 0SG1071 FUSION OF 2 OR MORE LUMBAR VERTEBRAL JOINTS WITH AUTOLOGOUS TISSUE SUBSTITUTE, POSTERIOR APPROACH, POSTERIOR COLUMN, OPEN APPROACH: ICD-10-PCS | Performed by: ORTHOPAEDIC SURGERY

## 2020-12-15 PROCEDURE — 77030019908 HC STETH ESOPH SIMS -A: Performed by: ANESTHESIOLOGY

## 2020-12-15 PROCEDURE — 74011250636 HC RX REV CODE- 250/636: Performed by: ANESTHESIOLOGY

## 2020-12-15 PROCEDURE — 01NB0ZZ RELEASE LUMBAR NERVE, OPEN APPROACH: ICD-10-PCS | Performed by: ORTHOPAEDIC SURGERY

## 2020-12-15 PROCEDURE — 74011000250 HC RX REV CODE- 250: Performed by: ANESTHESIOLOGY

## 2020-12-15 PROCEDURE — 77030002933 HC SUT MCRYL J&J -A: Performed by: ORTHOPAEDIC SURGERY

## 2020-12-15 PROCEDURE — 77030040361 HC SLV COMPR DVT MDII -B

## 2020-12-15 PROCEDURE — 77030040922 HC BLNKT HYPOTHRM STRY -A

## 2020-12-15 PROCEDURE — 74011250637 HC RX REV CODE- 250/637: Performed by: ORTHOPAEDIC SURGERY

## 2020-12-15 PROCEDURE — 4A11X4G MONITORING OF PERIPHERAL NERVOUS ELECTRICAL ACTIVITY, INTRAOPERATIVE, EXTERNAL APPROACH: ICD-10-PCS | Performed by: ORTHOPAEDIC SURGERY

## 2020-12-15 PROCEDURE — 77030038552 HC DRN WND MDII -A: Performed by: ORTHOPAEDIC SURGERY

## 2020-12-15 PROCEDURE — 77030040465 HC GRFT MATRIX VIBONE REGE -H: Performed by: ORTHOPAEDIC SURGERY

## 2020-12-15 PROCEDURE — 76060000040 HC ANESTHESIA 4.5 TO 5 HR: Performed by: ORTHOPAEDIC SURGERY

## 2020-12-15 PROCEDURE — 77030037202 HC SPCR SPN BULL TIP PEK VBR IBF REGE -I1: Performed by: ORTHOPAEDIC SURGERY

## 2020-12-15 PROCEDURE — 65270000029 HC RM PRIVATE

## 2020-12-15 PROCEDURE — 77030029099 HC BN WAX SSPC -A: Performed by: ORTHOPAEDIC SURGERY

## 2020-12-15 PROCEDURE — 77030003161 HC GRFT DURA MTRX INLC -E: Performed by: ORTHOPAEDIC SURGERY

## 2020-12-15 PROCEDURE — 77030026188 HC BN CANC CHP CRSH PR LIFV -E: Performed by: ORTHOPAEDIC SURGERY

## 2020-12-15 PROCEDURE — 77030031139 HC SUT VCRL2 J&J -A: Performed by: ORTHOPAEDIC SURGERY

## 2020-12-15 PROCEDURE — 74011000250 HC RX REV CODE- 250: Performed by: ORTHOPAEDIC SURGERY

## 2020-12-15 PROCEDURE — 00NY0ZZ RELEASE LUMBAR SPINAL CORD, OPEN APPROACH: ICD-10-PCS | Performed by: ORTHOPAEDIC SURGERY

## 2020-12-15 PROCEDURE — 74011250636 HC RX REV CODE- 250/636: Performed by: ORTHOPAEDIC SURGERY

## 2020-12-15 PROCEDURE — 77030034479 HC ADH SKN CLSR PRINEO J&J -B: Performed by: ORTHOPAEDIC SURGERY

## 2020-12-15 PROCEDURE — 77030026438 HC STYL ET INTUB CARD -A: Performed by: ANESTHESIOLOGY

## 2020-12-15 PROCEDURE — 77030038692 HC WND DEB SYS IRMX -B: Performed by: ORTHOPAEDIC SURGERY

## 2020-12-15 PROCEDURE — 77030033067 HC SUT PDO STRATFX SPIR J&J -B: Performed by: ORTHOPAEDIC SURGERY

## 2020-12-15 PROCEDURE — 77030008684 HC TU ET CUF COVD -B: Performed by: ANESTHESIOLOGY

## 2020-12-15 PROCEDURE — 77030013079 HC BLNKT BAIR HGGR 3M -A: Performed by: ANESTHESIOLOGY

## 2020-12-15 PROCEDURE — 77030002924 HC SUT GORTX WLGO -B: Performed by: ORTHOPAEDIC SURGERY

## 2020-12-15 PROCEDURE — 77030018723 HC ELCTRD BLD COVD -A: Performed by: ORTHOPAEDIC SURGERY

## 2020-12-15 PROCEDURE — 74011000250 HC RX REV CODE- 250: Performed by: NURSE ANESTHETIST, CERTIFIED REGISTERED

## 2020-12-15 PROCEDURE — 77030014650 HC SEAL MTRX FLOSEL BAXT -C: Performed by: ORTHOPAEDIC SURGERY

## 2020-12-15 PROCEDURE — 76210000000 HC OR PH I REC 2 TO 2.5 HR: Performed by: ORTHOPAEDIC SURGERY

## 2020-12-15 DEVICE — SPACER SPNL VERT BULL TIP 11 X 22 MM: Type: IMPLANTABLE DEVICE | Site: SPINE LUMBAR | Status: FUNCTIONAL

## 2020-12-15 DEVICE — IMPLANTABLE DEVICE: Type: IMPLANTABLE DEVICE | Site: SPINE LUMBAR | Status: FUNCTIONAL

## 2020-12-15 DEVICE — DURAGEN® SUTURABLE DURAL REGENERATION MATRIX, 2 IN X 2 IN (5 CM X 5 CM)
Type: IMPLANTABLE DEVICE | Site: SPINE LUMBAR | Status: FUNCTIONAL
Brand: DURAGEN® SUTURABLE

## 2020-12-15 DEVICE — SCREW SPNL L50MM OD65MM REDUC STREAMLINE TL: Type: IMPLANTABLE DEVICE | Site: SPINE LUMBAR | Status: FUNCTIONAL

## 2020-12-15 DEVICE — BONE CHIP CANC CRSH 1-8MM 30ML --: Type: IMPLANTABLE DEVICE | Site: SPINE LUMBAR | Status: FUNCTIONAL

## 2020-12-15 DEVICE — SPACER SPNL 0.46CC W9XH10X8XL22MM PEEK CNVX BULL TIP TANT: Type: IMPLANTABLE DEVICE | Site: SPINE LUMBAR | Status: FUNCTIONAL

## 2020-12-15 DEVICE — 5.5MM CURVED ROD 85MM TI ALLOY
Type: IMPLANTABLE DEVICE | Site: SPINE LUMBAR | Status: FUNCTIONAL
Brand: TAURUS

## 2020-12-15 DEVICE — GRAFT BNE SUB 5CC VIABLE MTRX VIBNE: Type: IMPLANTABLE DEVICE | Site: SPINE LUMBAR | Status: FUNCTIONAL

## 2020-12-15 DEVICE — SCR SET SPNE STREAMLINE TL --: Type: IMPLANTABLE DEVICE | Site: SPINE LUMBAR | Status: FUNCTIONAL

## 2020-12-15 RX ORDER — ONDANSETRON 2 MG/ML
INJECTION INTRAMUSCULAR; INTRAVENOUS AS NEEDED
Status: DISCONTINUED | OUTPATIENT
Start: 2020-12-15 | End: 2020-12-15 | Stop reason: HOSPADM

## 2020-12-15 RX ORDER — DEXAMETHASONE SODIUM PHOSPHATE 4 MG/ML
INJECTION, SOLUTION INTRA-ARTICULAR; INTRALESIONAL; INTRAMUSCULAR; INTRAVENOUS; SOFT TISSUE AS NEEDED
Status: DISCONTINUED | OUTPATIENT
Start: 2020-12-15 | End: 2020-12-15 | Stop reason: HOSPADM

## 2020-12-15 RX ORDER — PANTOPRAZOLE SODIUM 40 MG/1
40 TABLET, DELAYED RELEASE ORAL
Status: DISCONTINUED | OUTPATIENT
Start: 2020-12-16 | End: 2020-12-21 | Stop reason: HOSPADM

## 2020-12-15 RX ORDER — SODIUM CHLORIDE 0.9 % (FLUSH) 0.9 %
5-40 SYRINGE (ML) INJECTION AS NEEDED
Status: DISCONTINUED | OUTPATIENT
Start: 2020-12-15 | End: 2020-12-21 | Stop reason: HOSPADM

## 2020-12-15 RX ORDER — ATORVASTATIN CALCIUM 10 MG/1
10 TABLET, FILM COATED ORAL DAILY
Status: DISCONTINUED | OUTPATIENT
Start: 2020-12-16 | End: 2020-12-21 | Stop reason: HOSPADM

## 2020-12-15 RX ORDER — SODIUM CHLORIDE, SODIUM LACTATE, POTASSIUM CHLORIDE, CALCIUM CHLORIDE 600; 310; 30; 20 MG/100ML; MG/100ML; MG/100ML; MG/100ML
INJECTION, SOLUTION INTRAVENOUS
Status: DISCONTINUED | OUTPATIENT
Start: 2020-12-15 | End: 2020-12-15 | Stop reason: HOSPADM

## 2020-12-15 RX ORDER — ONDANSETRON 2 MG/ML
4 INJECTION INTRAMUSCULAR; INTRAVENOUS AS NEEDED
Status: DISCONTINUED | OUTPATIENT
Start: 2020-12-15 | End: 2020-12-15 | Stop reason: HOSPADM

## 2020-12-15 RX ORDER — ALBUTEROL SULFATE 0.83 MG/ML
2.5 SOLUTION RESPIRATORY (INHALATION) AS NEEDED
Status: DISCONTINUED | OUTPATIENT
Start: 2020-12-15 | End: 2020-12-15 | Stop reason: HOSPADM

## 2020-12-15 RX ORDER — PROPOFOL 10 MG/ML
INJECTION, EMULSION INTRAVENOUS
Status: DISCONTINUED | OUTPATIENT
Start: 2020-12-15 | End: 2020-12-15 | Stop reason: HOSPADM

## 2020-12-15 RX ORDER — NALOXONE HYDROCHLORIDE 0.4 MG/ML
0.04 INJECTION, SOLUTION INTRAMUSCULAR; INTRAVENOUS; SUBCUTANEOUS
Status: DISCONTINUED | OUTPATIENT
Start: 2020-12-15 | End: 2020-12-15 | Stop reason: HOSPADM

## 2020-12-15 RX ORDER — ONDANSETRON 2 MG/ML
4 INJECTION INTRAMUSCULAR; INTRAVENOUS
Status: ACTIVE | OUTPATIENT
Start: 2020-12-15 | End: 2020-12-16

## 2020-12-15 RX ORDER — HYDROMORPHONE HCL/0.9% NACL/PF 0.5 MG/ML
PLASTIC BAG, INJECTION (ML) INTRAVENOUS
Status: DISPENSED | OUTPATIENT
Start: 2020-12-15 | End: 2020-12-16

## 2020-12-15 RX ORDER — HYDROMORPHONE HYDROCHLORIDE 2 MG/ML
INJECTION, SOLUTION INTRAMUSCULAR; INTRAVENOUS; SUBCUTANEOUS AS NEEDED
Status: DISCONTINUED | OUTPATIENT
Start: 2020-12-15 | End: 2020-12-15 | Stop reason: HOSPADM

## 2020-12-15 RX ORDER — FERROUS SULFATE, DRIED 160(50) MG
1 TABLET, EXTENDED RELEASE ORAL 2 TIMES DAILY WITH MEALS
Status: DISCONTINUED | OUTPATIENT
Start: 2020-12-16 | End: 2020-12-21 | Stop reason: HOSPADM

## 2020-12-15 RX ORDER — SODIUM CHLORIDE 0.9 % (FLUSH) 0.9 %
5-40 SYRINGE (ML) INJECTION EVERY 8 HOURS
Status: DISCONTINUED | OUTPATIENT
Start: 2020-12-15 | End: 2020-12-15 | Stop reason: HOSPADM

## 2020-12-15 RX ORDER — HYDROMORPHONE HYDROCHLORIDE 1 MG/ML
0.5 INJECTION, SOLUTION INTRAMUSCULAR; INTRAVENOUS; SUBCUTANEOUS
Status: ACTIVE | OUTPATIENT
Start: 2020-12-15 | End: 2020-12-16

## 2020-12-15 RX ORDER — DIPHENHYDRAMINE HYDROCHLORIDE 50 MG/ML
12.5 INJECTION, SOLUTION INTRAMUSCULAR; INTRAVENOUS
Status: DISCONTINUED | OUTPATIENT
Start: 2020-12-15 | End: 2020-12-21 | Stop reason: HOSPADM

## 2020-12-15 RX ORDER — LOSARTAN POTASSIUM 50 MG/1
100 TABLET ORAL DAILY
Status: DISCONTINUED | OUTPATIENT
Start: 2020-12-15 | End: 2020-12-21 | Stop reason: HOSPADM

## 2020-12-15 RX ORDER — FENTANYL CITRATE 50 UG/ML
25 INJECTION, SOLUTION INTRAMUSCULAR; INTRAVENOUS
Status: DISCONTINUED | OUTPATIENT
Start: 2020-12-15 | End: 2020-12-15 | Stop reason: HOSPADM

## 2020-12-15 RX ORDER — GLYCOPYRROLATE 0.2 MG/ML
INJECTION INTRAMUSCULAR; INTRAVENOUS AS NEEDED
Status: DISCONTINUED | OUTPATIENT
Start: 2020-12-15 | End: 2020-12-15 | Stop reason: HOSPADM

## 2020-12-15 RX ORDER — SODIUM CHLORIDE 0.9 % (FLUSH) 0.9 %
5-40 SYRINGE (ML) INJECTION AS NEEDED
Status: DISCONTINUED | OUTPATIENT
Start: 2020-12-15 | End: 2020-12-15 | Stop reason: HOSPADM

## 2020-12-15 RX ORDER — VANCOMYCIN HYDROCHLORIDE 1 G/20ML
INJECTION, POWDER, LYOPHILIZED, FOR SOLUTION INTRAVENOUS AS NEEDED
Status: DISCONTINUED | OUTPATIENT
Start: 2020-12-15 | End: 2020-12-15 | Stop reason: HOSPADM

## 2020-12-15 RX ORDER — AMOXICILLIN 250 MG
1 CAPSULE ORAL 2 TIMES DAILY
Status: DISCONTINUED | OUTPATIENT
Start: 2020-12-15 | End: 2020-12-21 | Stop reason: HOSPADM

## 2020-12-15 RX ORDER — PROPOFOL 10 MG/ML
INJECTION, EMULSION INTRAVENOUS AS NEEDED
Status: DISCONTINUED | OUTPATIENT
Start: 2020-12-15 | End: 2020-12-15 | Stop reason: HOSPADM

## 2020-12-15 RX ORDER — DIPHENHYDRAMINE HYDROCHLORIDE 50 MG/ML
12.5 INJECTION, SOLUTION INTRAMUSCULAR; INTRAVENOUS AS NEEDED
Status: DISCONTINUED | OUTPATIENT
Start: 2020-12-15 | End: 2020-12-15 | Stop reason: HOSPADM

## 2020-12-15 RX ORDER — CYCLOBENZAPRINE HCL 10 MG
10 TABLET ORAL
Status: DISCONTINUED | OUTPATIENT
Start: 2020-12-15 | End: 2020-12-21 | Stop reason: HOSPADM

## 2020-12-15 RX ORDER — LIDOCAINE HYDROCHLORIDE 10 MG/ML
0.1 INJECTION, SOLUTION EPIDURAL; INFILTRATION; INTRACAUDAL; PERINEURAL AS NEEDED
Status: DISCONTINUED | OUTPATIENT
Start: 2020-12-15 | End: 2020-12-15 | Stop reason: HOSPADM

## 2020-12-15 RX ORDER — ALBUTEROL SULFATE 90 UG/1
AEROSOL, METERED RESPIRATORY (INHALATION) AS NEEDED
Status: DISCONTINUED | OUTPATIENT
Start: 2020-12-15 | End: 2020-12-15 | Stop reason: HOSPADM

## 2020-12-15 RX ORDER — MIDAZOLAM HYDROCHLORIDE 1 MG/ML
INJECTION, SOLUTION INTRAMUSCULAR; INTRAVENOUS AS NEEDED
Status: DISCONTINUED | OUTPATIENT
Start: 2020-12-15 | End: 2020-12-15 | Stop reason: HOSPADM

## 2020-12-15 RX ORDER — OXYCODONE HYDROCHLORIDE 5 MG/1
5 TABLET ORAL
Status: DISCONTINUED | OUTPATIENT
Start: 2020-12-15 | End: 2020-12-21 | Stop reason: HOSPADM

## 2020-12-15 RX ORDER — LANOLIN ALCOHOL/MO/W.PET/CERES
400 CREAM (GRAM) TOPICAL DAILY
Status: DISCONTINUED | OUTPATIENT
Start: 2020-12-16 | End: 2020-12-21 | Stop reason: HOSPADM

## 2020-12-15 RX ORDER — HYDROMORPHONE HYDROCHLORIDE 1 MG/ML
.25-1 INJECTION, SOLUTION INTRAMUSCULAR; INTRAVENOUS; SUBCUTANEOUS
Status: DISCONTINUED | OUTPATIENT
Start: 2020-12-15 | End: 2020-12-15 | Stop reason: HOSPADM

## 2020-12-15 RX ORDER — POVIDONE-IODINE 10 %
SOLUTION, NON-ORAL TOPICAL AS NEEDED
Status: DISCONTINUED | OUTPATIENT
Start: 2020-12-15 | End: 2020-12-15 | Stop reason: HOSPADM

## 2020-12-15 RX ORDER — MELATONIN
1000 2 TIMES DAILY
Status: DISCONTINUED | OUTPATIENT
Start: 2020-12-15 | End: 2020-12-21 | Stop reason: HOSPADM

## 2020-12-15 RX ORDER — SODIUM CHLORIDE, SODIUM LACTATE, POTASSIUM CHLORIDE, CALCIUM CHLORIDE 600; 310; 30; 20 MG/100ML; MG/100ML; MG/100ML; MG/100ML
125 INJECTION, SOLUTION INTRAVENOUS CONTINUOUS
Status: DISCONTINUED | OUTPATIENT
Start: 2020-12-15 | End: 2020-12-15 | Stop reason: HOSPADM

## 2020-12-15 RX ORDER — SODIUM CHLORIDE 0.9 % (FLUSH) 0.9 %
5-40 SYRINGE (ML) INJECTION EVERY 8 HOURS
Status: DISCONTINUED | OUTPATIENT
Start: 2020-12-15 | End: 2020-12-21 | Stop reason: HOSPADM

## 2020-12-15 RX ORDER — LIDOCAINE HYDROCHLORIDE 20 MG/ML
INJECTION, SOLUTION EPIDURAL; INFILTRATION; INTRACAUDAL; PERINEURAL AS NEEDED
Status: DISCONTINUED | OUTPATIENT
Start: 2020-12-15 | End: 2020-12-15 | Stop reason: HOSPADM

## 2020-12-15 RX ORDER — POLYETHYLENE GLYCOL 3350 17 G/17G
17 POWDER, FOR SOLUTION ORAL DAILY
Status: DISCONTINUED | OUTPATIENT
Start: 2020-12-16 | End: 2020-12-21 | Stop reason: HOSPADM

## 2020-12-15 RX ORDER — IPRATROPIUM BROMIDE AND ALBUTEROL SULFATE 2.5; .5 MG/3ML; MG/3ML
3 SOLUTION RESPIRATORY (INHALATION)
Status: DISCONTINUED | OUTPATIENT
Start: 2020-12-15 | End: 2020-12-21 | Stop reason: HOSPADM

## 2020-12-15 RX ORDER — FENTANYL CITRATE 50 UG/ML
INJECTION, SOLUTION INTRAMUSCULAR; INTRAVENOUS AS NEEDED
Status: DISCONTINUED | OUTPATIENT
Start: 2020-12-15 | End: 2020-12-15 | Stop reason: HOSPADM

## 2020-12-15 RX ORDER — SODIUM CHLORIDE 9 MG/ML
125 INJECTION, SOLUTION INTRAVENOUS CONTINUOUS
Status: DISPENSED | OUTPATIENT
Start: 2020-12-15 | End: 2020-12-16

## 2020-12-15 RX ORDER — FACIAL-BODY WIPES
10 EACH TOPICAL DAILY PRN
Status: DISCONTINUED | OUTPATIENT
Start: 2020-12-17 | End: 2020-12-21 | Stop reason: HOSPADM

## 2020-12-15 RX ORDER — ROCURONIUM BROMIDE 10 MG/ML
INJECTION, SOLUTION INTRAVENOUS AS NEEDED
Status: DISCONTINUED | OUTPATIENT
Start: 2020-12-15 | End: 2020-12-15 | Stop reason: HOSPADM

## 2020-12-15 RX ORDER — OXYCODONE HYDROCHLORIDE 5 MG/1
10 TABLET ORAL
Status: DISCONTINUED | OUTPATIENT
Start: 2020-12-15 | End: 2020-12-21 | Stop reason: HOSPADM

## 2020-12-15 RX ORDER — PHENYLEPHRINE HCL IN 0.9% NACL 0.4MG/10ML
SYRINGE (ML) INTRAVENOUS AS NEEDED
Status: DISCONTINUED | OUTPATIENT
Start: 2020-12-15 | End: 2020-12-15 | Stop reason: HOSPADM

## 2020-12-15 RX ORDER — SUCCINYLCHOLINE CHLORIDE 20 MG/ML
INJECTION INTRAMUSCULAR; INTRAVENOUS AS NEEDED
Status: DISCONTINUED | OUTPATIENT
Start: 2020-12-15 | End: 2020-12-15 | Stop reason: HOSPADM

## 2020-12-15 RX ORDER — FLUMAZENIL 0.1 MG/ML
0.2 INJECTION INTRAVENOUS
Status: DISCONTINUED | OUTPATIENT
Start: 2020-12-15 | End: 2020-12-15 | Stop reason: HOSPADM

## 2020-12-15 RX ORDER — MONTELUKAST SODIUM 10 MG/1
10 TABLET ORAL
Status: DISCONTINUED | OUTPATIENT
Start: 2020-12-15 | End: 2020-12-21 | Stop reason: HOSPADM

## 2020-12-15 RX ORDER — CETIRIZINE HCL 10 MG
10 TABLET ORAL DAILY
Status: DISCONTINUED | OUTPATIENT
Start: 2020-12-16 | End: 2020-12-21 | Stop reason: HOSPADM

## 2020-12-15 RX ORDER — ACETAMINOPHEN 325 MG/1
650 TABLET ORAL
Status: DISCONTINUED | OUTPATIENT
Start: 2020-12-15 | End: 2020-12-21 | Stop reason: HOSPADM

## 2020-12-15 RX ORDER — KETAMINE HYDROCHLORIDE 10 MG/ML
INJECTION, SOLUTION INTRAMUSCULAR; INTRAVENOUS AS NEEDED
Status: DISCONTINUED | OUTPATIENT
Start: 2020-12-15 | End: 2020-12-15 | Stop reason: HOSPADM

## 2020-12-15 RX ORDER — NALOXONE HYDROCHLORIDE 0.4 MG/ML
0.4 INJECTION, SOLUTION INTRAMUSCULAR; INTRAVENOUS; SUBCUTANEOUS AS NEEDED
Status: DISCONTINUED | OUTPATIENT
Start: 2020-12-15 | End: 2020-12-21 | Stop reason: HOSPADM

## 2020-12-15 RX ADMIN — PHENYLEPHRINE HYDROCHLORIDE 10 MCG/MIN: 10 INJECTION INTRAVENOUS at 13:31

## 2020-12-15 RX ADMIN — Medication 10 ML: at 21:12

## 2020-12-15 RX ADMIN — ONDANSETRON HYDROCHLORIDE 4 MG: 2 SOLUTION INTRAMUSCULAR; INTRAVENOUS at 16:07

## 2020-12-15 RX ADMIN — HYDROMORPHONE HYDROCHLORIDE 0.5 MG: 2 INJECTION INTRAMUSCULAR; INTRAVENOUS; SUBCUTANEOUS at 15:51

## 2020-12-15 RX ADMIN — ALBUTEROL SULFATE 3 PUFF: 90 AEROSOL, METERED RESPIRATORY (INHALATION) at 13:23

## 2020-12-15 RX ADMIN — FENTANYL CITRATE 100 MCG: 0.05 INJECTION, SOLUTION INTRAMUSCULAR; INTRAVENOUS at 12:55

## 2020-12-15 RX ADMIN — Medication: at 18:06

## 2020-12-15 RX ADMIN — Medication 100 MCG: at 13:04

## 2020-12-15 RX ADMIN — SODIUM CHLORIDE 125 ML/HR: 900 INJECTION, SOLUTION INTRAVENOUS at 18:09

## 2020-12-15 RX ADMIN — KETAMINE HYDROCHLORIDE 50 MG: 10 INJECTION INTRAMUSCULAR; INTRAVENOUS at 13:31

## 2020-12-15 RX ADMIN — SODIUM CHLORIDE, POTASSIUM CHLORIDE, SODIUM LACTATE AND CALCIUM CHLORIDE: 600; 310; 30; 20 INJECTION, SOLUTION INTRAVENOUS at 13:59

## 2020-12-15 RX ADMIN — KETAMINE HYDROCHLORIDE 10 MG: 10 INJECTION INTRAMUSCULAR; INTRAVENOUS at 15:14

## 2020-12-15 RX ADMIN — MIDAZOLAM HYDROCHLORIDE 2 MG: 2 INJECTION, SOLUTION INTRAMUSCULAR; INTRAVENOUS at 12:48

## 2020-12-15 RX ADMIN — MONTELUKAST SODIUM 10 MG: 10 TABLET, FILM COATED ORAL at 21:11

## 2020-12-15 RX ADMIN — CEFAZOLIN SODIUM 2 G: 1 POWDER, FOR SOLUTION INTRAMUSCULAR; INTRAVENOUS at 13:31

## 2020-12-15 RX ADMIN — HYDROMORPHONE HYDROCHLORIDE 0.5 MG: 2 INJECTION INTRAMUSCULAR; INTRAVENOUS; SUBCUTANEOUS at 14:15

## 2020-12-15 RX ADMIN — ONDANSETRON HYDROCHLORIDE 4 MG: 2 SOLUTION INTRAMUSCULAR; INTRAVENOUS at 13:48

## 2020-12-15 RX ADMIN — GLYCOPYRROLATE 0.1 MG: 0.2 INJECTION INTRAMUSCULAR; INTRAVENOUS at 13:48

## 2020-12-15 RX ADMIN — DOCUSATE SODIUM 50MG AND SENNOSIDES 8.6MG 1 TABLET: 8.6; 5 TABLET, FILM COATED ORAL at 21:11

## 2020-12-15 RX ADMIN — PROPOFOL 180 MG: 10 INJECTION, EMULSION INTRAVENOUS at 12:58

## 2020-12-15 RX ADMIN — HYDROMORPHONE HYDROCHLORIDE 0.5 MG: 2 INJECTION INTRAMUSCULAR; INTRAVENOUS; SUBCUTANEOUS at 16:07

## 2020-12-15 RX ADMIN — HYDROMORPHONE HYDROCHLORIDE 0.5 MG: 2 INJECTION INTRAMUSCULAR; INTRAVENOUS; SUBCUTANEOUS at 15:14

## 2020-12-15 RX ADMIN — CEFAZOLIN SODIUM 2 G: 1 POWDER, FOR SOLUTION INTRAMUSCULAR; INTRAVENOUS at 20:16

## 2020-12-15 RX ADMIN — ROCURONIUM BROMIDE 30 MG: 10 INJECTION INTRAVENOUS at 13:31

## 2020-12-15 RX ADMIN — SUCCINYLCHOLINE CHLORIDE 100 MG: 20 INJECTION, SOLUTION INTRAMUSCULAR; INTRAVENOUS at 12:58

## 2020-12-15 RX ADMIN — LIDOCAINE HYDROCHLORIDE 100 MG: 20 INJECTION, SOLUTION EPIDURAL; INFILTRATION; INTRACAUDAL; PERINEURAL at 12:57

## 2020-12-15 RX ADMIN — LOSARTAN POTASSIUM 100 MG: 50 TABLET, FILM COATED ORAL at 21:11

## 2020-12-15 RX ADMIN — CHOLECALCIFEROL TAB 25 MCG (1000 UNIT) 1 TABLET: 25 TAB at 21:11

## 2020-12-15 RX ADMIN — PROPOFOL 100 MCG/KG/MIN: 10 INJECTION, EMULSION INTRAVENOUS at 13:14

## 2020-12-15 RX ADMIN — ARFORMOTEROL TARTRATE: 15 SOLUTION RESPIRATORY (INHALATION) at 22:22

## 2020-12-15 RX ADMIN — DEXAMETHASONE SODIUM PHOSPHATE 4 MG: 4 INJECTION, SOLUTION INTRAMUSCULAR; INTRAVENOUS at 13:48

## 2020-12-15 RX ADMIN — KETAMINE HYDROCHLORIDE 10 MG: 10 INJECTION INTRAMUSCULAR; INTRAVENOUS at 14:15

## 2020-12-15 RX ADMIN — SODIUM CHLORIDE, POTASSIUM CHLORIDE, SODIUM LACTATE AND CALCIUM CHLORIDE 125 ML/HR: 600; 310; 30; 20 INJECTION, SOLUTION INTRAVENOUS at 11:44

## 2020-12-15 NOTE — H&P
Date of Surgery Update:  Chriss Oneal was seen and examined. History and physical has been reviewed. The patient has been examined.  There have been no significant clinical changes since the completion of the originally dated History and Physical.    Signed By: Keith Graff MD     December 15, 2020 10:56 AM

## 2020-12-15 NOTE — ANESTHESIA PREPROCEDURE EVALUATION
Relevant Problems   No relevant active problems       Anesthetic History   No history of anesthetic complications            Review of Systems / Medical History  Patient summary reviewed and pertinent labs reviewed    Pulmonary        Sleep apnea    Asthma        Neuro/Psych   Within defined limits        Pertinent negatives: No seizures, TIA and CVA   Cardiovascular    Hypertension            Pertinent negatives: No past MI, angina and CHF  Exercise tolerance: >4 METS     GI/Hepatic/Renal     GERD        Pertinent negatives: No renal disease   Endo/Other        Morbid obesity and arthritis  Pertinent negatives: No diabetes   Other Findings   Comments: RA on MTX and Humira           Physical Exam    Airway  Mallampati: II  TM Distance: 4 - 6 cm  Neck ROM: normal range of motion   Mouth opening: Normal     Cardiovascular    Rhythm: regular  Rate: normal         Dental  No notable dental hx       Pulmonary  Breath sounds clear to auscultation               Abdominal         Other Findings            Anesthetic Plan    ASA: 3  Anesthesia type: general          Induction: Intravenous  Anesthetic plan and risks discussed with: Patient

## 2020-12-15 NOTE — OP NOTES
[address]  [address] [facility] [place], [address] [facility]    OPERATIVE REPORT      NAME: Yanet Meléndez    AGE: 70 y.o. YOB: 1945    MEDICAL RECORD NUMBER: [id number]    DATE OF SURGERY: 12/15/2020    PREOPERATIVE DIAGNOSES:  1. Lumbar stenosis  2. Acquired lumbar spondylolisthesis    POSTOPERATIVE DIAGNOSES:  1. Lumbar stenosis   2. Acquired lumbar spondylolisthesis     OPERATIVE PROCEDURES:   1. Laminectomy, partial facetectomy, and foraminotomy of L2, L3, L4, L5, S1.  2. Posterolateral fusion and posterior lumbar interbody fusion of L2-L3. 3. Posterolateral fusion and posterior lumbar interbody fusion of L3-L4. 4. Posterolateral fusion and posterior lumbar interbody fusion of L4-L5. 5. Pedicle screw instrumentation with RTI pedicle screws for L2, L3, L4, and L5 bilaterally. 6. Application of biomechanical RTI interbody device at L4-L5, L3-L4, and L2-L3.  7. Morselized allograft for spine surgery and local autograft for spine surgery with stem cells. SURGEON: Prosper Renteria MD     ASSISTANT: ALBERTO Feliciano    ANESTHESIA: General    Specimens - none    Implants - see above    COMPLICATIONS: None apparent     NEUROMONITORING: We used SSEPs and spontaneous EMGs. We also stimulated the pedicle screws. ESTIMATED BLOOD LOSS: 300 cc    INDICATION FOR PROCEDURE: The patient is a very pleasant 70 y.o. female with debilitating back pain and leg pain. She failed conservative measures. She elected to proceed with operative intervention. She was aware of the risks, benefits, and alternatives. She provided informed consent. PROCEDURE: The patient was identified in the preoperative holding area. Her lumbar spine was marked by me. She was transferred to the operating room where general anesthesia was given. She was also given perioperative antibiotics. She was placed prone on the [place] table. All bony prominences were well padded.  The lumbar spine was prepped and draped in the usual standard fashion. We performed a surgical timeout. I made a skin incision in the midline. I exposed the posterior lumbar spine. I took intraoperative fluoroscopy to verify our levels. I exposed the transverse processes of L2, L3, L4, and L5 bilaterally. I then began my decompression by performing a laminectomy of L2 L3, L4, and L5. I also performed a partial facetectomy and foraminotomy to decompress the thecal sac and the roots of L2, L3, L4, and L5 bilaterally. I also performed a laminectomy of S1. I performed a transforaminal approach on the right side with exposure of the right L4-L5 disk space by widening our partial facetectomy and foraminotomy at L4-L5. I performed an identical transforaminal approach on the right side with exposure of the right L3-L4 and L2-L3 disk spaces. I then performed a standard diskectomy at L4-L5, L3-L4, and L2-L3. I prepared the endplates to bleeding bone. I performed trial sizing. I placed the appropriately sized biomechanical device into L4-L5, L3-L4, and L2-L3 with the appropriate amount of tension and alignment. The biomechanical devices were packed with autograft and morselized allograft. I then cannulated our pedicles for L2, L3, L4, and L5 bilaterally in standard fashion. I probed each pedicle. I copiously irrigated the entire wound. I decorticated our fusion beds bilaterally with a high speed bur. I placed our bone graft into our fusion beds bilaterally. I then placed pedicle screws for L2, L3, L4, and L5 bilaterally in standard fashion under fluoroscopic guidance. I had good purchase for each pedicle screw. I stimulated all the screws with pedicle screw stimulation. The pedicle screws were found to be within the appropriate range of amplitude. I then placed contoured rods on top of the pedicle screws bilaterally. The rods were locked to the screws according to the 's specification. We had good hemostasis.  There was no CSF leaking. There was a dural bleb centrally located over L4-L5. This was closed with suture. The fusion beds were packed with morselized allograft and local autograft. The exposed neurologic elements were protected with Duragen. I injected the soft tissues with a pain management cocktail. A deep drain was placed. The wound was closed in several layers. A sterile dressing was applied. The patient was extubated and transferred to the recovery room in good medical condition. The PA assisted with retraction and wound closure    I, Dr. Helena Page, performed the above procedure.      Helena Page MD  12/15/2020

## 2020-12-16 LAB
ANION GAP SERPL CALC-SCNC: 4 MMOL/L (ref 5–15)
BASOPHILS # BLD: 0 K/UL (ref 0–0.1)
BASOPHILS NFR BLD: 0 % (ref 0–1)
BUN SERPL-MCNC: 14 MG/DL (ref 6–20)
BUN/CREAT SERPL: 19 (ref 12–20)
CALCIUM SERPL-MCNC: 8.1 MG/DL (ref 8.5–10.1)
CHLORIDE SERPL-SCNC: 113 MMOL/L (ref 97–108)
CO2 SERPL-SCNC: 23 MMOL/L (ref 21–32)
COMMENT, HOLDF: NORMAL
CREAT SERPL-MCNC: 0.72 MG/DL (ref 0.55–1.02)
DIFFERENTIAL METHOD BLD: ABNORMAL
EOSINOPHIL # BLD: 0 K/UL (ref 0–0.4)
EOSINOPHIL NFR BLD: 0 % (ref 0–7)
ERYTHROCYTE [DISTWIDTH] IN BLOOD BY AUTOMATED COUNT: 19.9 % (ref 11.5–14.5)
GLUCOSE SERPL-MCNC: 136 MG/DL (ref 65–100)
HCT VFR BLD AUTO: 26.1 % (ref 35–47)
HGB BLD-MCNC: 6.8 G/DL (ref 11.5–16)
HGB BLD-MCNC: 7.4 G/DL (ref 11.5–16)
IMM GRANULOCYTES # BLD AUTO: 0.1 K/UL (ref 0–0.04)
IMM GRANULOCYTES NFR BLD AUTO: 1 % (ref 0–0.5)
LYMPHOCYTES # BLD: 1.4 K/UL (ref 0.8–3.5)
LYMPHOCYTES NFR BLD: 11 % (ref 12–49)
MCH RBC QN AUTO: 24.4 PG (ref 26–34)
MCHC RBC AUTO-ENTMCNC: 28.4 G/DL (ref 30–36.5)
MCV RBC AUTO: 86.1 FL (ref 80–99)
MONOCYTES # BLD: 1 K/UL (ref 0–1)
MONOCYTES NFR BLD: 8 % (ref 5–13)
NEUTS SEG # BLD: 10.1 K/UL (ref 1.8–8)
NEUTS SEG NFR BLD: 80 % (ref 32–75)
NRBC # BLD: 0 K/UL (ref 0–0.01)
NRBC BLD-RTO: 0 PER 100 WBC
PLATELET # BLD AUTO: 259 K/UL (ref 150–400)
PMV BLD AUTO: 9.7 FL (ref 8.9–12.9)
POTASSIUM SERPL-SCNC: 4.7 MMOL/L (ref 3.5–5.1)
RBC # BLD AUTO: 3.03 M/UL (ref 3.8–5.2)
RBC MORPH BLD: ABNORMAL
SAMPLES BEING HELD,HOLD: NORMAL
SODIUM SERPL-SCNC: 140 MMOL/L (ref 136–145)
WBC # BLD AUTO: 12.6 K/UL (ref 3.6–11)

## 2020-12-16 PROCEDURE — 94640 AIRWAY INHALATION TREATMENT: CPT

## 2020-12-16 PROCEDURE — 36415 COLL VENOUS BLD VENIPUNCTURE: CPT

## 2020-12-16 PROCEDURE — 97165 OT EVAL LOW COMPLEX 30 MIN: CPT

## 2020-12-16 PROCEDURE — 94760 N-INVAS EAR/PLS OXIMETRY 1: CPT

## 2020-12-16 PROCEDURE — 65270000029 HC RM PRIVATE

## 2020-12-16 PROCEDURE — 97530 THERAPEUTIC ACTIVITIES: CPT

## 2020-12-16 PROCEDURE — 97116 GAIT TRAINING THERAPY: CPT

## 2020-12-16 PROCEDURE — 74011250637 HC RX REV CODE- 250/637: Performed by: ORTHOPAEDIC SURGERY

## 2020-12-16 PROCEDURE — 80048 BASIC METABOLIC PNL TOTAL CA: CPT

## 2020-12-16 PROCEDURE — 85025 COMPLETE CBC W/AUTO DIFF WBC: CPT

## 2020-12-16 PROCEDURE — 74011250636 HC RX REV CODE- 250/636: Performed by: ORTHOPAEDIC SURGERY

## 2020-12-16 PROCEDURE — 77010033678 HC OXYGEN DAILY

## 2020-12-16 PROCEDURE — 97535 SELF CARE MNGMENT TRAINING: CPT

## 2020-12-16 PROCEDURE — 97161 PT EVAL LOW COMPLEX 20 MIN: CPT

## 2020-12-16 PROCEDURE — 74011000250 HC RX REV CODE- 250: Performed by: ORTHOPAEDIC SURGERY

## 2020-12-16 PROCEDURE — 85018 HEMOGLOBIN: CPT

## 2020-12-16 RX ADMIN — Medication 10 ML: at 15:24

## 2020-12-16 RX ADMIN — CHOLECALCIFEROL TAB 25 MCG (1000 UNIT) 1 TABLET: 25 TAB at 08:16

## 2020-12-16 RX ADMIN — POLYETHYLENE GLYCOL 3350 17 G: 17 POWDER, FOR SOLUTION ORAL at 08:15

## 2020-12-16 RX ADMIN — Medication 10 ML: at 21:27

## 2020-12-16 RX ADMIN — DOCUSATE SODIUM 50MG AND SENNOSIDES 8.6MG 1 TABLET: 8.6; 5 TABLET, FILM COATED ORAL at 17:13

## 2020-12-16 RX ADMIN — ATORVASTATIN CALCIUM 10 MG: 10 TABLET, FILM COATED ORAL at 08:15

## 2020-12-16 RX ADMIN — CEFAZOLIN SODIUM 2 G: 1 POWDER, FOR SOLUTION INTRAMUSCULAR; INTRAVENOUS at 11:29

## 2020-12-16 RX ADMIN — LOSARTAN POTASSIUM 100 MG: 50 TABLET, FILM COATED ORAL at 21:25

## 2020-12-16 RX ADMIN — OXYCODONE 5 MG: 5 TABLET ORAL at 09:56

## 2020-12-16 RX ADMIN — ARFORMOTEROL TARTRATE: 15 SOLUTION RESPIRATORY (INHALATION) at 20:55

## 2020-12-16 RX ADMIN — MONTELUKAST SODIUM 10 MG: 10 TABLET, FILM COATED ORAL at 21:25

## 2020-12-16 RX ADMIN — OXYCODONE 5 MG: 5 TABLET ORAL at 21:25

## 2020-12-16 RX ADMIN — DOCUSATE SODIUM 50MG AND SENNOSIDES 8.6MG 1 TABLET: 8.6; 5 TABLET, FILM COATED ORAL at 08:15

## 2020-12-16 RX ADMIN — CEFAZOLIN SODIUM 2 G: 1 POWDER, FOR SOLUTION INTRAMUSCULAR; INTRAVENOUS at 04:00

## 2020-12-16 RX ADMIN — Medication 400 MG: at 08:16

## 2020-12-16 RX ADMIN — Medication 10 ML: at 06:08

## 2020-12-16 RX ADMIN — OYSTER SHELL CALCIUM WITH VITAMIN D 1 TABLET: 500; 200 TABLET, FILM COATED ORAL at 17:13

## 2020-12-16 RX ADMIN — OXYCODONE 5 MG: 5 TABLET ORAL at 15:23

## 2020-12-16 RX ADMIN — CHOLECALCIFEROL TAB 25 MCG (1000 UNIT) 1 TABLET: 25 TAB at 17:13

## 2020-12-16 RX ADMIN — CETIRIZINE HYDROCHLORIDE 10 MG: 10 TABLET, FILM COATED ORAL at 08:16

## 2020-12-16 RX ADMIN — ARFORMOTEROL TARTRATE: 15 SOLUTION RESPIRATORY (INHALATION) at 08:42

## 2020-12-16 RX ADMIN — OYSTER SHELL CALCIUM WITH VITAMIN D 1 TABLET: 500; 200 TABLET, FILM COATED ORAL at 08:16

## 2020-12-16 RX ADMIN — PANTOPRAZOLE SODIUM 40 MG: 40 TABLET, DELAYED RELEASE ORAL at 06:08

## 2020-12-16 RX ADMIN — PANTOPRAZOLE SODIUM 40 MG: 40 TABLET, DELAYED RELEASE ORAL at 17:13

## 2020-12-16 NOTE — PROGRESS NOTES
Problem: Mobility Impaired (Adult and Pediatric)  Goal: *Acute Goals and Plan of Care (Insert Text)  Description: FUNCTIONAL STATUS PRIOR TO ADMISSION: Patient was modified independent using a single point cane for functional mobility. HOME SUPPORT PRIOR TO ADMISSION: The patient lived with spouse but did not require assist.    Physical Therapy Goals  Initiated 12/16/2020    1. Patient will move from supine to sit and sit to supine , scoot up and down, and roll side to side in bed with independence within 4 days. 2. Patient will perform sit to stand with independence within 4 days. 3. Patient will ambulate with modified independence for 200 feet with the least restrictive device within 4 days. 4. Patient will verbalize and demonstrate understanding of spinal precautions (No bending, lifting greater than 5 lbs, or twisting; log-roll technique; frequent repositioning as instructed) within 4 days. Outcome: Progressing Towards Goal   PHYSICAL THERAPY EVALUATION  Patient: Ck Jean-Baptiste (89 y.o. female)  Date: 12/16/2020  Primary Diagnosis: Spondylolisthesis of lumbar region [M43.16]  Spinal stenosis of lumbar region with neurogenic claudication [M48.062]  Lumbar stenosis with neurogenic claudication [M48.062]  Procedure(s) (LRB):  L2-S1 LAMINECTOMY, L2-L5 FUSION,  INSTRUMENTATION, TLIF, BONE GRAFT (N/A) 1 Day Post-Op   Precautions: Log roll, Back brace when OOB, may be up to bathroom without the back brace. ASSESSMENT  Based on the objective data described below, the patient presents with difficulty with ambulation. Communicated with nurse cleared for therapy. Reviewed back precautions and donning of back brace verbalized understanding. Sit on the edge of bed CGA, sit to stand CGA, ambulate with single point cane and rolling walker in the room and out on the 4th floor walker. Assisted to and from the bathroom CGA.  OOB to chair as tolerated performed some active range of motion exercise on both LE all planes. Communicated with nurse who agreed to monitor patient. Current Level of Function Impacting Discharge (mobility/balance): CGA with transfers and ambulation using a rolling walker and single point cane. Functional Outcome Measure: The patient scored 65/100 on the barthel index outcome measure . Other factors to consider for discharge: non     Patient will benefit from skilled therapy intervention to address the above noted impairments. PLAN :  Recommendations and Planned Interventions: bed mobility training, transfer training, gait training, therapeutic exercises, neuromuscular re-education, orthotic/prosthetic training, patient and family training/education, and therapeutic activities      Frequency/Duration: Patient will be followed by physical therapy:  twice daily to address goals. Recommendation for discharge: (in order for the patient to meet his/her long term goals)  No skilled physical therapy/ follow up rehabilitation needs identified at this time. This discharge recommendation:  Has been made in collaboration with the attending provider and/or case management    IF patient discharges home will need the following DME: patient owns DME required for discharge         SUBJECTIVE:   Patient stated Edilma Naik.     OBJECTIVE DATA SUMMARY:   HISTORY:    Past Medical History:   Diagnosis Date    Asthma     Chronic sinusitis     Fibromyalgia     GERD (gastroesophageal reflux disease)     Hypertension     Leg wound, left 11/1/20 to present    Morbid obesity with BMI of 40.0-44.9, adult ([facility] [address] 12/09/2020    Multiple allergies     Obstructive sleep apnea on CPAP     Osteopenia     RA (rheumatoid arthritis) ([facility] [address]      Past Surgical History:   Procedure Laterality Date    HX CARPAL TUNNEL RELEASE Right 2006         HX CARPAL TUNNEL RELEASE Left 2014    HX HERNIA REPAIR  2015    HX ROTATOR CUFF REPAIR Right          HX SEPTOPLASTY  2012    HX TONSILLECTOMY         Personal factors and/or comorbidities impacting plan of care:     Home Situation  Home Environment: Private residence  # Steps to Enter: 0  One/Two Story Residence: One story  Living Alone: No  Support Systems: Spouse/Significant Other/Partner  Patient Expects to be Discharged to[de-identified] Private residence  Current DME Used/Available at Home: Emile Page, straight, Walker, Raised toilet seat    EXAMINATION/PRESENTATION/DECISION MAKING:   Critical Behavior:  Neurologic State: Alert  Orientation Level: Oriented X4  Cognition: Follows commands  Safety/Judgement: Awareness of environment  Hearing: Auditory  Auditory Impairment: None  Hearing Aids/Status: Does not own    Range Of Motion:  AROM: Within functional limits           PROM: Within functional limits           Strength:    Strength: Generally decreased, functional                    Tone & Sensation:                                  Coordination:  Coordination: Within functional limits  Vision:   Tracking: Able to track stimulus in all quadrants w/o difficulty  Diplopia: No  Acuity: Within Defined Limits  Functional Mobility:  Bed Mobility:  Rolling: (pt was received up and remained up)  Supine to Sit: Contact guard assistance  Sit to Supine: Contact guard assistance  Scooting: Supervision(from chair level)  Transfers:  Sit to Stand: Contact guard assistance  Stand to Sit: Contact guard assistance  Stand Pivot Transfers: Contact guard assistance     Bed to Chair: Contact guard assistance              Balance:   Sitting: Intact  Standing: Intact; With support  Ambulation/Gait Training:  Distance (ft): 140 Feet (ft)  Assistive Device: Gait belt;Walker, rolling  Ambulation - Level of Assistance: Contact guard assistance     Gait Description (WDL): Exceptions to WDL  Gait Abnormalities: Decreased step clearance        Base of Support: Narrowed     Speed/May: Pace decreased (<100 feet/min)  Step Length: Right shortened;Left shortened                Therapeutic Exercises:    Instructed patient to continue active range of motion exercise on both legs while up on chair or on bed. Functional Measure:  Barthel Index:    Bathin  Bladder: 10  Bowels: 10  Groomin  Dressin  Feeding: 10  Mobility: 10  Stairs: 0  Toilet Use: 5  Transfer (Bed to Chair and Back): 10  Total: 65/100       The Barthel ADL Index: Guidelines  1. The index should be used as a record of what a patient does, not as a record of what a patient could do. 2. The main aim is to establish degree of independence from any help, physical or verbal, however minor and for whatever reason. 3. The need for supervision renders the patient not independent. 4. A patient's performance should be established using the best available evidence. Asking the patient, friends/relatives and nurses are the usual sources, but direct observation and common sense are also important. However direct testing is not needed. 5. Usually the patient's performance over the preceding 24-48 hours is important, but occasionally longer periods will be relevant. 6. Middle categories imply that the patient supplies over 50 per cent of the effort. 7. Use of aids to be independent is allowed. Berkley Limon., Barthel, DJeanW. (4000). Functional evaluation: the Barthel Index. [address] [place] [employer] (14)2. Chares Show main [place], J.J.M.F, Luís Mcbride., Lynn Sandoval., [place], [address] [place] [address] (). Measuring the change indisability after inpatient rehabilitation; comparison of the responsiveness of the Barthel Index and Functional [place] Measure. Journal of Neurology, Neurosurgery, and Psychiatry, 66(4), 988-812. Trish Meng, [place].MARGARETH, CAS Bejarano, & Queta Solis M.A. (2004.) Assessment of post-stroke quality of life in cost-effectiveness studies: The usefulness of the Barthel Index and the EuroQoL-5D.  Quality of Life Research, 15, 378-25           Physical Therapy Evaluation Charge Determination   History Examination Presentation Decision-Making   LOW Complexity : Zero comorbidities / personal factors that will impact the outcome / POC LOW Complexity : 1-2 Standardized tests and measures addressing body structure, function, activity limitation and / or participation in recreation  LOW Complexity : Stable, uncomplicated  Other outcome measures barthel   LOW       Based on the above components, the patient evaluation is determined to be of the following complexity level: LOW     Pain Ratin/10    Activity Tolerance:   Good    After treatment patient left in no apparent distress:   Sitting in chair, Heels elevated for pressure relief, and Call bell within reach    COMMUNICATION/EDUCATION:   The patients plan of care was discussed with: Occupational therapist, Registered nurse, and Case management. Fall prevention education was provided and the patient/caregiver indicated understanding. Thank you for this referral.  Magdalene Garcias, PT,WCC.    Time Calculation: 28 mins

## 2020-12-16 NOTE — PROGRESS NOTES
12/16/2020  9:42 AM  Reason for Admission: Elective - Spondylolisthesis requiring Surgery    Assessment:   [x]In person with pt   []Via p/c with pt   []With family member in person. Who/Relation:     []With family member via p/c. Who/Relation:   []Chart Review    RUR: 13%  Risk Level: [x]Low []Moderate []High  Value-based purchasing: [] Yes [x] No  Bundle patient: [] Yes [x] No   Specify:     Advance Directive: Full Code. No AD on file. Assessment:    Age: 70    Sex: [] Male [x]Female     Residency: [x]Private residence []Apartment []Assisted Living []LTC []Other:   Exterior Steps: ramp  Interior Steps: 0    Lives With: [x]With spouse []Other family members []Underage children []Alone []Care provider []Other:    Prior functioning:  [x]Independent []Dependent []Partial dependence   Pt requires assistance with: []Bathing []Dressing []Toileting []Ambulation     Prior DME required:  [x]None []RW []Cane []Crutches []Bedside commode []CPAP []Home O2 (Liter/Provider: ) []Nebulizer   []Shower Chair []Wheelchair []Hospital Bed []Marta []Stair lift [x]Rollator (for long distances) []Other:    DME available: []None []RW []Cane []Crutches []Bedside commode []CPAP []Home O2 (Liter/Provider: ) []Nebulizer   []Shower Chair []Wheelchair []Hospital Bed []Marta []Stair lift [x]Rollator []Other:    Rehab history: [x]None []Outpatient PT []Home Health (Provider/Date: ) []SNF (Provider/Date: ) []IPR (Provider/Date: ) []LTC (Provider/Date: )    Discharge Concerns: []Yes [x]No []Unknown   Describe: Insurer:  [employer] / Medicare    PCP: Raymon Vigil MD   Name of Practice: [address] [place] [address]   Current patient: [x]Yes []No   Approximate date of last visit: 10/2020   Access to virtual PCP visits: []Yes [x]No    Pharmacy:  [facility] Transport:  Family        Transition of care plan:    []Unable to determine at this time. Awaiting clinical progress, and disposition recommendations.     [] Home with outpatient follow-up    [] Home with Outpatient PT and outpatient follow-up   Pt aware of OP appt? []Yes, Provider:   []Not scheduled   Transport provider:     [] Home with family assistance as needed and outpatient follow-up   Family able to assist:    Schedule:  Transport provider:      [x] Home with Home Health   - Provider:  [facility]IM consult for [facility] noted. Pt preference indicated as [facility]. CM completed referral via [employer], and they accepted. []SNF/IPR   -[]Preferences given:   []Listing provided and preferences requested   -Status: []Pending []Accepted:    -Auth required: []Yes []No    -Auth initiated date:   -3 midnight stay required: []Yes []No  Date satisfied:     [] Other:     Yolande Gifford MA    Care Management Interventions  PCP Verified by CM: Yes(Irene)  Mode of Transport at Discharge:  Other (see comment)(Family)  Transition of Care Consult (CM Consult): Discharge Planning, [address]: No  Reason Outside [place]: Out of service area  [address]: No  Physical Therapy Consult: Yes  Occupational Therapy Consult: Yes  Speech Therapy Consult: No  Current Support Network: Lives with Spouse  Confirm Follow Up Transport: Family  Discharge Location  Discharge Placement: Home with home health

## 2020-12-16 NOTE — PERIOP NOTES
TRANSFER - OUT REPORT:    Verbal report given to ARNEL Lewis on Michelle Lux  being transferred to [facility] for routine post - op       Report consisted of patients Situation, Background, Assessment and   Recommendations(SBAR). Information from the following report(s) SBAR, OR Summary, Procedure Summary, Intake/Output, MAR, Accordion and Cardiac Rhythm NSR was reviewed with the receiving nurse. Lines:   Peripheral IV 12/15/20 Left Forearm (Active)   Site Assessment Clean, dry, & intact 12/15/20 1931   Phlebitis Assessment 0 12/15/20 1931   Infiltration Assessment 0 12/15/20 1931   Dressing Status Clean, dry, & intact 12/15/20 1931   Dressing Type Transparent;Tape 12/15/20 1931   Hub Color/Line Status Blue;Flushed;Capped 12/15/20 1931   Action Taken Open ports on tubing capped 12/15/20 1143   Alcohol Cap Used Yes 12/15/20 1931       Peripheral IV 12/15/20 Left Wrist (Active)   Site Assessment Clean, dry, & intact 12/15/20 1931   Phlebitis Assessment 0 12/15/20 1931   Infiltration Assessment 0 12/15/20 1931   Dressing Status Clean, dry, & intact 12/15/20 1931   Dressing Type Transparent;Tape 12/15/20 1931   Hub Color/Line Status Pink;Flushed; Infusing 12/15/20 1931   Action Taken Open ports on tubing capped 12/15/20 1931   Alcohol Cap Used Yes 12/15/20 1931        Opportunity for questions and clarification was provided.       Patient transported with:   O2 @ NSR liters  Registered Nurse

## 2020-12-16 NOTE — ANESTHESIA POSTPROCEDURE EVALUATION
Procedure(s):  L2-S1 LAMINECTOMY, L2-L5 FUSION,  INSTRUMENTATION, TLIF, BONE GRAFT. general    Anesthesia Post Evaluation      Multimodal analgesia: multimodal analgesia not used between 6 hours prior to anesthesia start to PACU discharge  Patient location during evaluation: PACU  Patient participation: complete - patient participated  Level of consciousness: sleepy but conscious (responds to voice)  Pain management: adequate  Airway patency: patent  Anesthetic complications: no  Cardiovascular status: acceptable, blood pressure returned to baseline and hemodynamically stable  Respiratory status: acceptable  Hydration status: acceptable  Post anesthesia nausea and vomiting:  controlled  Final Post Anesthesia Temperature Assessment:  Normothermia (36.0-37.5 degrees C)      INITIAL Post-op Vital signs:   Vitals Value Taken Time   /64 12/15/2020  7:30 PM   Temp 36.4 °C (97.5 °F) 12/15/2020  6:01 PM   Pulse 64 12/15/2020  7:31 PM   Resp 9 12/15/2020  7:31 PM   SpO2 96 % 12/15/2020  7:31 PM   Vitals shown include unvalidated device data.

## 2020-12-16 NOTE — FACE TO FACE
Rounded on patient, f/u from Spine Pre-op Patient Education Class. Patient viewed spine class on line  Patient states class information was valuable in preparing for surgery. Patient states their home space is prepared and safe. Reviewed incentive spirometry use and mobility precautions. Questions answered.

## 2020-12-16 NOTE — PROGRESS NOTES
PHYSICAL THERAPY TREATMENT  Patient: Dedrick Valente (32 y.o. female)  Date: 12/16/2020  Diagnosis: Spondylolisthesis of lumbar region [M43.16]  Spinal stenosis of lumbar region with neurogenic claudication [M48.062]  Lumbar stenosis with neurogenic claudication [M48.062] <principal problem not specified>  Procedure(s) (LRB):  L2-S1 LAMINECTOMY, L2-L5 FUSION,  INSTRUMENTATION, TLIF, BONE GRAFT (N/A) 1 Day Post-Op  Precautions:    Chart, physical therapy assessment, plan of care and goals were reviewed. ASSESSMENT  Patient continues with skilled PT services. Pt seen for afternoon treatment. Pt comes to stand from chair with back brace in place CGA. Pt ambulated 140ft with RW CGA. Pt left sitting. Pt progressing well. Continue goals. Current Level of Function Impacting Discharge (mobility/balance): Pt CGA ambulating with RW on level surface. PLAN :  Patient continues to benefit from skilled intervention to address the above impairments. Continue treatment per established plan of care. to address goals. Recommendation for discharge: (in order for the patient to meet his/her long term goals)  Physical therapy at least 2 days/week in the home     This discharge recommendation:  Has been made in collaboration with the attending provider and/or case management    IF patient discharges home will need the following DME: rolling walker       SUBJECTIVE:       OBJECTIVE DATA SUMMARY:   Critical Behavior:  Neurologic State: Alert  Orientation Level: Oriented X4  Cognition: Follows commands  Safety/Judgement: Awareness of environment  Functional Mobility Training:  Bed Mobility:  Rolling: (pt was received up and remained up)        Scooting: Supervision(from chair level)        Transfers:  Sit to Stand: Contact guard assistance  Stand to Sit: Contact guard assistance        Bed to Chair: Contact guard assistance                    Balance:  Sitting: Intact  Standing: Intact; With support  Ambulation/Gait Training:  Distance (ft): 140 Feet (ft)  Assistive Device: Gait belt;Walker, rolling  Ambulation - Level of Assistance: Contact guard assistance        Gait Abnormalities: Decreased step clearance        Base of Support: Narrowed     Speed/May: Pace decreased (<100 feet/min)  Step Length: Right shortened;Left shortened                      Activity Tolerance:   Good    After treatment patient left in no apparent distress:   Sitting in chair    COMMUNICATION/COLLABORATION:   The patients plan of care was discussed with: Physical therapist.     Latia Garcia, PTA

## 2020-12-16 NOTE — PROGRESS NOTES
Ortho Spine F/u      Pain controlled. Medically stable. Hgb 7.4 but asymptomatic. Discussed plan of care and answered all questions. Plan for d/c home Thurs/Fri.       Nicci Meléndez NP

## 2020-12-16 NOTE — PROGRESS NOTES
Problem: Falls - Risk of  Goal: *Absence of Falls  Description: Document Carlito Click Fall Risk and appropriate interventions in the flowsheet. Outcome: Progressing Towards Goal  Note: Fall Risk Interventions:  Mobility Interventions: Bed/chair exit alarm, Communicate number of staff needed for ambulation/transfer, Patient to call before getting OOB, Utilize walker, cane, or other assistive device, Utilize gait belt for transfers/ambulation         Medication Interventions: Bed/chair exit alarm, Patient to call before getting OOB, Teach patient to arise slowly, Utilize gait belt for transfers/ambulation    Elimination Interventions: Bed/chair exit alarm, Call light in reach, Patient to call for help with toileting needs, Stay With Me (per policy)    History of Falls Interventions: Bed/chair exit alarm, Investigate reason for fall, Room close to nurse's station, Utilize gait belt for transfer/ambulation         Problem: Patient Education: Go to Patient Education Activity  Goal: Patient/Family Education  Outcome: Progressing Towards Goal     Problem: Pressure Injury - Risk of  Goal: *Prevention of pressure injury  Description: Document Baron Scale and appropriate interventions in the flowsheet. Outcome: Progressing Towards Goal  Note: Pressure Injury Interventions:             Activity Interventions: Increase time out of bed, Pressure redistribution bed/mattress(bed type), PT/OT evaluation    Mobility Interventions: HOB 30 degrees or less, Pressure redistribution bed/mattress (bed type), PT/OT evaluation    Nutrition Interventions: Document food/fluid/supplement intake, Discuss nutritional consult with provider, Offer support with meals,snacks and hydration                     Problem: Patient Education: Go to Patient Education Activity  Goal: Patient/Family Education  Outcome: Progressing Towards Goal     Problem: Pain  Goal: *Control of Pain  Outcome: Progressing Towards Goal     Problem: Patient Education: Go to Patient Education Activity  Goal: Patient/Family Education  Outcome: Progressing Towards Goal     Problem: Patient Education: Go to Patient Education Activity  Goal: Patient/Family Education  Outcome: Progressing Towards Goal     Problem: Complex Spine Procedure:  Day of Surgery  Goal: Activity/Safety  Outcome: Progressing Towards Goal  Goal: Consults, if ordered  Outcome: Progressing Towards Goal  Goal: Diagnostic Test/Procedures  Outcome: Progressing Towards Goal  Goal: Nutrition/Diet  Outcome: Progressing Towards Goal  Goal: Discharge Planning  Outcome: Progressing Towards Goal  Goal: Medications  Outcome: Progressing Towards Goal  Goal: Respiratory  Outcome: Progressing Towards Goal  Goal: Treatments/Interventions/Procedures  Outcome: Progressing Towards Goal  Goal: Psychosocial  Outcome: Progressing Towards Goal  Goal: *Optimal pain control at patient's stated goal  Outcome: Progressing Towards Goal  Goal: *Demonstrates progressive activity  Outcome: Progressing Towards Goal  Goal: *Respiratory status stable  Outcome: Progressing Towards Goal     Problem: Complex Spine Procedure:  Post Op Day 1  Goal: Activity/Safety  Outcome: Progressing Towards Goal  Goal: Consults, if ordered  Outcome: Progressing Towards Goal  Goal: Diagnostic Test/Procedures  Outcome: Progressing Towards Goal  Goal: Nutrition/Diet  Outcome: Progressing Towards Goal  Goal: Discharge Planning  Outcome: Progressing Towards Goal  Goal: Medications  Outcome: Progressing Towards Goal  Goal: Respiratory  Outcome: Progressing Towards Goal  Goal: Treatments/Interventions/Procedures  Outcome: Progressing Towards Goal  Goal: Psychosocial  Outcome: Progressing Towards Goal  Goal: *Progress independence mobility/activities (eg: Mobility precautions)  Outcome: Progressing Towards Goal  Goal: *Verbalizes/demonstrates understanding of proper body mechanics and use of stabilization device if ordered  Outcome: Progressing Towards Goal  Goal: *Optimal pain control at patient's stated goal  Outcome: Progressing Towards Goal  Goal: *Resumes normal function of bladder and bowel  Outcome: Progressing Towards Goal  Goal: *Hemodynamically stable  Outcome: Progressing Towards Goal  Goal: *Tolerating diet  Outcome: Progressing Towards Goal  Goal: *Labs within defined limits  Outcome: Progressing Towards Goal     Problem: Complex Spine Procedure:  Discharge Outcomes  Goal: *Optimal pain control at patient's stated goal  Outcome: Progressing Towards Goal  Goal: *Demonstrates ability to place and remove stabilization device  Outcome: Progressing Towards Goal  Goal: *Progress independence mobility/activities (eg: Mobility precautions)  Outcome: Progressing Towards Goal  Goal: *Resumes normal function of bladder and bowel  Outcome: Progressing Towards Goal  Goal: *Lungs clear or at baseline  Outcome: Progressing Towards Goal  Goal: *Verbalizes name, dosage, time, side effects, and number of days to continue medications  Outcome: Progressing Towards Goal  Goal: *Modified independence with transfers, ambulation on levels with assistance devices, stair climbing, ADL's  Outcome: Progressing Towards Goal  Goal: *Describes follow-up/return visits to physicians  Outcome: Progressing Towards Goal  Goal: *Describes available resources and support systems  Outcome: Progressing Towards Goal  Goal: *Labs within defined limits  Outcome: Progressing Towards Goal  Goal: *Tolerating diet  Outcome: Progressing Towards Goal

## 2020-12-16 NOTE — PROGRESS NOTES
1925: Bedside shift change report given to Amish Llanos RN  (oncoming nurse) by Sho Dinh RN  (offgoing nurse). Report included the following information SBAR. Problem: Falls - Risk of  Goal: *Absence of Falls  Description: Document Albino Messing Fall Risk and appropriate interventions in the flowsheet.   Outcome: Progressing Towards Goal  Note: Fall Risk Interventions:  Mobility Interventions: Bed/chair exit alarm, Communicate number of staff needed for ambulation/transfer, OT consult for ADLs, Patient to call before getting OOB      Medication Interventions: Patient to call before getting OOB, Teach patient to arise slowly    Elimination Interventions: Bed/chair exit alarm, Call light in reach    History of Falls Interventions: Bed/chair exit alarm     Problem: Patient Education: Go to Patient Education Activity  Goal: Patient/Family Education  Outcome: Progressing Towards Goal     Problem: Complex Spine Procedure:  Post Op Day 1  Goal: Off Pathway (Use only if patient is Off Pathway)  Outcome: Progressing Towards Goal  Goal: Activity/Safety  Outcome: Progressing Towards Goal  Goal: Consults, if ordered  Outcome: Progressing Towards Goal  Goal: Diagnostic Test/Procedures  Outcome: Progressing Towards Goal  Goal: Nutrition/Diet  Outcome: Progressing Towards Goal  Goal: Discharge Planning  Outcome: Progressing Towards Goal  Goal: Medications  Outcome: Progressing Towards Goal  Goal: Respiratory  Outcome: Progressing Towards Goal  Goal: Treatments/Interventions/Procedures  Outcome: Progressing Towards Goal  Goal: Psychosocial  Outcome: Progressing Towards Goal  Goal: *Progress independence mobility/activities (eg: Mobility precautions)  Outcome: Progressing Towards Goal  Goal: *Verbalizes/demonstrates understanding of proper body mechanics and use of stabilization device if ordered  Outcome: Progressing Towards Goal  Goal: *Optimal pain control at patient's stated goal  Outcome: Progressing Towards Goal  Goal: *Resumes normal function of bladder and bowel  Outcome: Progressing Towards Goal  Goal: *Hemodynamically stable  Outcome: Progressing Towards Goal  Goal: *Tolerating diet  Outcome: Progressing Towards Goal  Goal: *Labs within defined limits  Outcome: Progressing Towards Goal

## 2020-12-16 NOTE — PROGRESS NOTES
Problem: Self Care Deficits Care Plan (Adult)  Goal: *Acute Goals and Plan of Care (Insert Text)  Description: FUNCTIONAL STATUS PRIOR TO ADMISSION: Patient was independent and active without use of DME.    HOME SUPPORT: The patient lived with  but did not require assist.    Occupational Therapy Goals  Initiated 12/16/2020    1. Patient will perform lower body dressing and bathing with supervision/set-up using AE PRN within 7 days. 2.  Patient will perform toilet transfers with supervision/set-up using rolling walker within 7 days. 3.  Patient will perform all aspects of toileting at supervision/set-up within 7 days. 4.  Patient will don/doff back brace at modified independence within 7 days. 5.  Patient will verbalize/demonstrate 3/3 back precautions during ADL tasks without cues within 7 days. Outcome: Progressing Towards Goal   OCCUPATIONAL THERAPY EVALUATION  Patient: Arthur Plummer (12 y.o. female)  Date: 12/16/2020  Primary Diagnosis: Spondylolisthesis of lumbar region [M43.16]  Spinal stenosis of lumbar region with neurogenic claudication [M48.062]  Lumbar stenosis with neurogenic claudication [M48.062]  Procedure(s) (LRB):  L2-S1 LAMINECTOMY, L2-L5 FUSION,  INSTRUMENTATION, TLIF, BONE GRAFT (N/A) 1 Day Post-Op   Precautions: fall, spinal, brace when OOB       ASSESSMENT  Based on the objective data described below, the patient presents with decreased activity tolerance on POD 1 of spinal surgery. Patient lives with her  who was present today for family training. Patient demonstrated good understanding of education provided regarding spinal precautions, back brace application, safe transfer techniques including log roll for bed mobility, energy conservation techniques, and adaptive ADL techniques. Patient was received up and remained up.   She was able to transfer into the bathroom for ADL retraining and ambulate within the room with one person assist.  Patient engaged in ADLs with good tolerance; She needs increased assistance for LB and standing ADLs. Patient would benefit from continued skilled OT to progress towards goals and improve overall independence. She will likely only need one additional treatment to achieve OT goals and be cleared for discharge from OT perspective. Current Level of Function Impacting Discharge (ADLs/self-care): Patient required CGA overall for OOB transfers using rolling walker. Patient required min A for UB + LB dressing and bathing, CGA for toileting. Functional Outcome Measure: The patient scored Total: 65/100 on the Barthel Index outcome measure. Patient will benefit from skilled therapy intervention to address the above noted impairments. PLAN :  Recommendations and Planned Interventions: self care training, functional mobility training, therapeutic exercise, balance training, therapeutic activities, endurance activities, patient education, home safety training, and family training/education    Frequency/Duration: Patient will be followed by occupational therapy 5 times a week to address goals. Recommendation for discharge: (in order for the patient to meet his/her long term goals)  No skilled occupational therapy/ follow up rehabilitation needs identified at this time. This discharge recommendation:  Has been made in collaboration with the attending provider and/or case management    IF patient discharges home will need the following DME: [employer]- patient agreeable to purchase upon discharge       SUBJECTIVE:   Patient stated This is so helpful; I love all the tips.     OBJECTIVE DATA SUMMARY:   HISTORY:   Past Medical History:   Diagnosis Date    Asthma     Chronic sinusitis     Fibromyalgia     GERD (gastroesophageal reflux disease)     Hypertension     Leg wound, left 11/1/20 to present    Morbid obesity with BMI of 40.0-44.9, adult ([facility] [address] 12/09/2020    Multiple allergies     Obstructive sleep apnea on CPAP Osteopenia     RA (rheumatoid arthritis) ([facility] [address]      Past Surgical History:   Procedure Laterality Date    HX CARPAL TUNNEL RELEASE Right 2006         HX CARPAL TUNNEL RELEASE Left 2014    HX HERNIA REPAIR  2015    HX ROTATOR CUFF REPAIR Right          HX SEPTOPLASTY  2012    HX TONSILLECTOMY         Expanded or extensive additional review of patient history:     Home Situation  Home Environment: Private residence  # Steps to Enter: 0  One/Two Story Residence: One story  Living Alone: No  Support Systems: Spouse/Significant Other/Partner  Patient Expects to be Discharged to[de-identified] Private residence  Current DME Used/Available at Home: Cane, straight, Walker, Raised toilet seat    Hand dominance: Right    EXAMINATION OF PERFORMANCE DEFICITS:  Cognitive/Behavioral Status:  Neurologic State: Alert  Orientation Level: Oriented X4  Cognition: Follows commands  Perception: Appears intact  Perseveration: No perseveration noted  Safety/Judgement: Awareness of environment    Skin: Intact in the uppers    Edema: None noted in the uppers    Hearing: Auditory  Auditory Impairment: None  Hearing Aids/Status: Does not own    Vision/Perceptual:    Tracking: Able to track stimulus in all quadrants w/o difficulty    Diplopia: No    Acuity: Within Defined Limits       Range of Motion:  WDL in the uppers    Strength:  WDL in the uppers    Coordination:  Fine Motor Skills-Upper: Left Intact; Right Intact    Gross Motor Skills-Upper: Left Intact; Right Intact    Tone & Sensation:  Tone: Normal  Sensation: intact    Balance:  Sitting: Intact  Standing: Intact; With support    Functional Mobility and Transfers for ADLs:  Bed Mobility:  Rolling: (pt was received up and remained up)  Scooting: Supervision(from chair level)    Transfers:  Sit to Stand: Contact guard assistance  Stand to Sit: Contact guard assistance  Bed to Chair: Contact guard assistance  Bathroom Mobility: Contact guard assistance  Toilet Transfer : Contact guard assistance    ADL Assessment:  Feeding: Independent    Oral Facial Hygiene/Grooming: Independent    Bathing: Minimum assistance    Upper Body Dressing: Minimum assistance(up to min A for brace application/management)    Lower Body Dressing: Minimum assistance    Toileting: Contact guard assistance      Bathing: Patient instructed when bathing to not submerge wound in water, stand to shower or sponge bathe, cover wound with plastic and tape to ensure no water reaches bandage/wound without cues. Patient indicated understanding. Dressing brace: Patient instructed and demonstrated to don/doff velcro on brace using dominant side, keeping non-dominant side intact. Patient instructed and demonstrated in meantime of being able to stand with back against wall to don/doff brace, to don/doff seated using lap and bed/chair surface to support brace while manipulating. Home safety: Patient instructed on home modifications and safety (raise height of ADL objects, appropriate height of chair surfaces, recliner safety, change of floor surfaces, clear pathways) to increase independence and fall prevention. Patient indicated understanding. Standing: Patient instructed to walk up to sink/counter top/surfaces, step into walker, square off while using objects, slide objects along surfaces, to increase adherence to back precautions and fall prevention. Patient instructed to increase amount of time standing in order to increase independence and tolerance with ADLs. Tub transfer: Patient instructed regarding when it is safe to begin transfer into tub (complete stairs with PT, advance exercises with PT high enough to clear tub height). Patient instructed to use the same technique as used with stairs when entering and exiting tub (\"up with the non-surgical, down with the surgical leg\"). Patient indicated understanding.      Cognitive Retraining  Safety/Judgement: Awareness of environment    Functional Measure:  Barthel Index:    Bathin  Bladder: 10  Bowels: 10  Groomin  Dressin  Feeding: 10  Mobility: 10  Stairs: 0  Toilet Use: 5  Transfer (Bed to Chair and Back): 10  Total: 65/100        The Barthel ADL Index: Guidelines  1. The index should be used as a record of what a patient does, not as a record of what a patient could do. 2. The main aim is to establish degree of independence from any help, physical or verbal, however minor and for whatever reason. 3. The need for supervision renders the patient not independent. 4. A patient's performance should be established using the best available evidence. Asking the patient, friends/relatives and nurses are the usual sources, but direct observation and common sense are also important. However direct testing is not needed. 5. Usually the patient's performance over the preceding 24-48 hours is important, but occasionally longer periods will be relevant. 6. Middle categories imply that the patient supplies over 50 per cent of the effort. 7. Use of aids to be independent is allowed. Andrés Fine., Barthel, D.W. (6032). Functional evaluation: the Barthel Index. [address] [facility] (14)2. XIMENA Rodriguez, Radha Kim., Derek Crowe., [place], [address] [facility] (). Measuring the change indisability after inpatient rehabilitation; comparison of the responsiveness of the Barthel Index and Functional [place] Measure. Journal of Neurology, Neurosurgery, and Psychiatry, 66(4), 669-232. Ruby Trujillo, N.J.A, SATINDER BejaranoJ.CAROSN, & Tasiha Andres MDEO. (2004.) Assessment of post-stroke quality of life in cost-effectiveness studies: The usefulness of the Barthel Index and the EuroQoL-5D.  Quality of Life Research, 15, 200-01         Occupational Therapy Evaluation Charge Determination   History Examination Decision-Making   LOW Complexity : Brief history review  LOW Complexity : 1-3 performance deficits relating to physical, cognitive , or psychosocial skils that result in activity limitations and / or participation restrictions  LOW Complexity : No comorbidities that affect functional and no verbal or physical assistance needed to complete eval tasks       Based on the above components, the patient evaluation is determined to be of the following complexity level: LOW     Activity Tolerance:   Good    After treatment patient left in no apparent distress:    Sitting in chair, Call bell within reach, Bed / chair alarm activated, and Caregiver / family present    COMMUNICATION/EDUCATION:   The patients plan of care was discussed with: Physical therapist, Registered nurse, and patient . Home safety education was provided and the patient/caregiver indicated understanding., Patient/family have participated as able in goal setting and plan of care. , and Patient/family agree to work toward stated goals and plan of care. This patients plan of care is appropriate for delegation to [facility] [place].     Thank you for this referral.  Maritza Cole OTR/L  Time Calculation: 43 mins

## 2020-12-16 NOTE — PROGRESS NOTES
The patient was provided a virtul link to view the pre-operative Spine Class. A pre-operative Patient education booklet specific to spine surgery was given to the patient in PAT. The content of the class was presented using an audio power point presentation specific for patients undergoing spine surgery. Incentive spirometer and CHG bath kits were verbally reviewed. Day of surgery routine and expectations, hospital routine and expectations, nutrition, alcohol, nicotine, medications, infection control, pain management, DVT precautions and equipment, ice therapy, durable medical equipment, exercises, mobility expectations and precautions, home preparation and safety were reviewed in class. My contact information was shared with the patient to provide further information as requested by the patient related to their upcoming surgery. Patient states that they viewed spine class online.

## 2020-12-16 NOTE — PROGRESS NOTES
ORTHOPAEDIC LUMBAR FUSION PROGRESS NOTE    NAME:     Romaine Carrel   :       1945   MRN:       [id number]   DATE:      2020    POD:    1 Day Post-Op  S/P:    Procedure(s):  L2-S1 LAMINECTOMY, L2-L5 FUSION,  INSTRUMENTATION, TLIF, BONE GRAFT    SUBJECTIVE:    C/O back pain along surgical incision  No leg pain or numbness  Denies nausea/vomiting, headache, dizziness, lightheadedness, near syncope, palpitations, chest pain or shortness of breath  Pain controlled    Recent Labs     20  0357   HGB 6.8*      K 4.7   *   CO2 23   BUN 14   CREA 0.72   *     Patient Vitals for the past 12 hrs:   BP Temp Pulse Resp SpO2   20 0300 134/78 97.9 °F (36.6 °C) 66 14 100 %   12/15/20 2222 -- -- -- -- 100 %   12/15/20 2159 121/67 97.4 °F (36.3 °C) (!) 57 14 100 %   12/15/20 2058 135/74 97.5 °F (36.4 °C) 66 12 100 %   12/15/20 1958 (!) 140/71 97.5 °F (36.4 °C) 65 12 100 %       EXAM:  Dressings clean, dry and intact   Positive strength/ROM bilat lower ext.   Neuro intact to sensation  Calves, soft & nontender  BL LEs NVID      PLAN:  Hgb 6.8 on labs, pt is asymptomatic, will check CBC w/ diff  D/C PCA, change to PO pain medications  PT/OT, OOB w/ assist  Advance diet as tolerated      Rosa Elena Mccormack [place]  Orthopaedic Surgery  Physician Assistant to Dr. Ana Marquez

## 2020-12-17 LAB
ANION GAP SERPL CALC-SCNC: 2 MMOL/L (ref 5–15)
BUN SERPL-MCNC: 14 MG/DL (ref 6–20)
BUN/CREAT SERPL: 14 (ref 12–20)
CALCIUM SERPL-MCNC: 8.9 MG/DL (ref 8.5–10.1)
CHLORIDE SERPL-SCNC: 109 MMOL/L (ref 97–108)
CO2 SERPL-SCNC: 26 MMOL/L (ref 21–32)
CREAT SERPL-MCNC: 1 MG/DL (ref 0.55–1.02)
ERYTHROCYTE [DISTWIDTH] IN BLOOD BY AUTOMATED COUNT: 20.3 % (ref 11.5–14.5)
GLUCOSE SERPL-MCNC: 173 MG/DL (ref 65–100)
HCT VFR BLD AUTO: 24.9 % (ref 35–47)
HGB BLD-MCNC: 6.9 G/DL (ref 11.5–16)
HGB BLD-MCNC: 7.2 G/DL (ref 11.5–16)
MCH RBC QN AUTO: 24.7 PG (ref 26–34)
MCHC RBC AUTO-ENTMCNC: 28.9 G/DL (ref 30–36.5)
MCV RBC AUTO: 85.6 FL (ref 80–99)
NRBC # BLD: 0 K/UL (ref 0–0.01)
NRBC BLD-RTO: 0 PER 100 WBC
PLATELET # BLD AUTO: 274 K/UL (ref 150–400)
PMV BLD AUTO: 10.6 FL (ref 8.9–12.9)
POTASSIUM SERPL-SCNC: 4.2 MMOL/L (ref 3.5–5.1)
RBC # BLD AUTO: 2.91 M/UL (ref 3.8–5.2)
SODIUM SERPL-SCNC: 137 MMOL/L (ref 136–145)
WBC # BLD AUTO: 11.7 K/UL (ref 3.6–11)

## 2020-12-17 PROCEDURE — 85027 COMPLETE CBC AUTOMATED: CPT

## 2020-12-17 PROCEDURE — 85018 HEMOGLOBIN: CPT

## 2020-12-17 PROCEDURE — 74011250637 HC RX REV CODE- 250/637: Performed by: NURSE PRACTITIONER

## 2020-12-17 PROCEDURE — 94640 AIRWAY INHALATION TREATMENT: CPT

## 2020-12-17 PROCEDURE — 97530 THERAPEUTIC ACTIVITIES: CPT

## 2020-12-17 PROCEDURE — 97535 SELF CARE MNGMENT TRAINING: CPT

## 2020-12-17 PROCEDURE — 74011000250 HC RX REV CODE- 250: Performed by: ORTHOPAEDIC SURGERY

## 2020-12-17 PROCEDURE — 94760 N-INVAS EAR/PLS OXIMETRY 1: CPT

## 2020-12-17 PROCEDURE — 65270000029 HC RM PRIVATE

## 2020-12-17 PROCEDURE — 74011250637 HC RX REV CODE- 250/637: Performed by: ORTHOPAEDIC SURGERY

## 2020-12-17 PROCEDURE — 36415 COLL VENOUS BLD VENIPUNCTURE: CPT

## 2020-12-17 PROCEDURE — 80048 BASIC METABOLIC PNL TOTAL CA: CPT

## 2020-12-17 PROCEDURE — 97116 GAIT TRAINING THERAPY: CPT

## 2020-12-17 RX ORDER — LANOLIN ALCOHOL/MO/W.PET/CERES
1 CREAM (GRAM) TOPICAL 2 TIMES DAILY WITH MEALS
Status: DISCONTINUED | OUTPATIENT
Start: 2020-12-17 | End: 2020-12-21 | Stop reason: HOSPADM

## 2020-12-17 RX ADMIN — ARFORMOTEROL TARTRATE: 15 SOLUTION RESPIRATORY (INHALATION) at 20:37

## 2020-12-17 RX ADMIN — LOSARTAN POTASSIUM 100 MG: 50 TABLET, FILM COATED ORAL at 21:04

## 2020-12-17 RX ADMIN — CHOLECALCIFEROL TAB 25 MCG (1000 UNIT) 1 TABLET: 25 TAB at 08:02

## 2020-12-17 RX ADMIN — CETIRIZINE HYDROCHLORIDE 10 MG: 10 TABLET, FILM COATED ORAL at 08:02

## 2020-12-17 RX ADMIN — OXYCODONE 5 MG: 5 TABLET ORAL at 15:36

## 2020-12-17 RX ADMIN — POLYETHYLENE GLYCOL 3350 17 G: 17 POWDER, FOR SOLUTION ORAL at 08:02

## 2020-12-17 RX ADMIN — OXYCODONE 5 MG: 5 TABLET ORAL at 21:04

## 2020-12-17 RX ADMIN — Medication 10 ML: at 07:04

## 2020-12-17 RX ADMIN — PANTOPRAZOLE SODIUM 40 MG: 40 TABLET, DELAYED RELEASE ORAL at 07:04

## 2020-12-17 RX ADMIN — MONTELUKAST SODIUM 10 MG: 10 TABLET, FILM COATED ORAL at 21:04

## 2020-12-17 RX ADMIN — ATORVASTATIN CALCIUM 10 MG: 10 TABLET, FILM COATED ORAL at 08:02

## 2020-12-17 RX ADMIN — Medication 10 ML: at 21:04

## 2020-12-17 RX ADMIN — FERROUS SULFATE TAB 325 MG (65 MG ELEMENTAL FE) 325 MG: 325 (65 FE) TAB at 17:14

## 2020-12-17 RX ADMIN — OYSTER SHELL CALCIUM WITH VITAMIN D 1 TABLET: 500; 200 TABLET, FILM COATED ORAL at 08:02

## 2020-12-17 RX ADMIN — OYSTER SHELL CALCIUM WITH VITAMIN D 1 TABLET: 500; 200 TABLET, FILM COATED ORAL at 17:11

## 2020-12-17 RX ADMIN — ARFORMOTEROL TARTRATE: 15 SOLUTION RESPIRATORY (INHALATION) at 09:18

## 2020-12-17 RX ADMIN — Medication 400 MG: at 08:02

## 2020-12-17 RX ADMIN — CHOLECALCIFEROL TAB 25 MCG (1000 UNIT) 1 TABLET: 25 TAB at 17:11

## 2020-12-17 RX ADMIN — DOCUSATE SODIUM 50MG AND SENNOSIDES 8.6MG 1 TABLET: 8.6; 5 TABLET, FILM COATED ORAL at 17:11

## 2020-12-17 RX ADMIN — OXYCODONE 5 MG: 5 TABLET ORAL at 09:55

## 2020-12-17 RX ADMIN — DOCUSATE SODIUM 50MG AND SENNOSIDES 8.6MG 1 TABLET: 8.6; 5 TABLET, FILM COATED ORAL at 08:02

## 2020-12-17 RX ADMIN — PANTOPRAZOLE SODIUM 40 MG: 40 TABLET, DELAYED RELEASE ORAL at 15:37

## 2020-12-17 RX ADMIN — Medication 10 ML: at 14:18

## 2020-12-17 NOTE — PROGRESS NOTES
Problem: Mobility Impaired (Adult and Pediatric)  Goal: *Acute Goals and Plan of Care (Insert Text)  Description: FUNCTIONAL STATUS PRIOR TO ADMISSION: Patient was modified independent using a single point cane for functional mobility. HOME SUPPORT PRIOR TO ADMISSION: The patient lived with spouse but did not require assist.    Physical Therapy Goals  Initiated 12/16/2020    1. Patient will move from supine to sit and sit to supine , scoot up and down, and roll side to side in bed with independence within 4 days. 2. Patient will perform sit to stand with independence within 4 days. 3. Patient will ambulate with modified independence for 200 feet with the least restrictive device within 4 days. 4. Patient will verbalize and demonstrate understanding of spinal precautions (No bending, lifting greater than 5 lbs, or twisting; log-roll technique; frequent repositioning as instructed) within 4 days. Outcome: Progressing Towards Goal  Note:   PHYSICAL THERAPY TREATMENT  Patient: Willa Fowler (72 y.o. female)  Date: 12/17/2020  Diagnosis: Spondylolisthesis of lumbar region [M43.16]  Spinal stenosis of lumbar region with neurogenic claudication [M48.062]  Lumbar stenosis with neurogenic claudication [M48.062] <principal problem not specified>  Procedure(s) (LRB):  L2-S1 LAMINECTOMY, L2-L5 FUSION,  INSTRUMENTATION, TLIF, BONE GRAFT (N/A) 2 Days Post-Op  Precautions:   No bending, no lifting greater than 5 lbs, no twisting, log-roll technique, repositioning every 20-30 min except when sleeping, brace when OOB (if ordered)  Chart, physical therapy assessment, plan of care and goals were reviewed. ASSESSMENT  Patient continues with skilled PT services and is progressing towards goals. HGB 6.9 mild dyspnea noted with gait training . Denies dizziness, lightheadedness during activity. Pt verbalized back precautions and wear schedule for LSO.  Pt has zero stairs to enter of within home, pt is cleared for discharge from PT standpoint when medically stable Pt remained in chair at end of session. Current Level of Function Impacting Discharge (mobility/balance): CGA    Other factors to consider for discharge: none         PLAN :  Patient continues to benefit from skilled intervention to address the above impairments. Continue treatment per established plan of care. to address goals. Recommendation for discharge: (in order for the patient to meet his/her long term goals)  Physical therapy at least 2 days/week in the home     This discharge recommendation:  Has been made in collaboration with the attending provider and/or case management    IF patient discharges home will need the following DME: rolling walker       SUBJECTIVE:   Patient stated i am feeling  more stiff today in my back.     OBJECTIVE DATA SUMMARY:   Critical Behavior:  Neurologic State: Alert  Orientation Level: Oriented X4  Cognition: Follows commands, Appropriate decision making  Safety/Judgement: Awareness of environment, Insight into deficits    Spinal diagnosis intervention:  The patient stated 3/3 back precautions when prompted. Reviewed all 3 back precautions, log roll technique, and sitting for 30 minutes at a time. The patient required few cues to maintain back precautions during functional activity. Reviewed back brace application and wear schedule. Brace donned with supervision/set-up      Functional Mobility Training:    Bed Mobility:  Log                   Transfers:  Sit to Stand: Contact guard assistance  Stand to Sit: Contact guard assistance                             Balance:  Sitting: Intact  Standing: Intact; With support  Ambulation/Gait Training:  Distance (ft): 80 Feet (ft)  Assistive Device: Walker, rolling;Gait belt  Ambulation - Level of Assistance: Contact guard assistance        Gait Abnormalities: Decreased step clearance;Trunk sway increased        Base of Support: Narrowed     Speed/May: Pace decreased (<100 feet/min)                       Stairs: Therapeutic Exercises:     Pain Ratin/10    Activity Tolerance:   Good    After treatment patient left in no apparent distress:   Sitting in chair and Call bell within reach    COMMUNICATION/COLLABORATION:   The patients plan of care was discussed with: Registered nurseJean Fitzgerald Skill   Time Calculation: 18 mins

## 2020-12-17 NOTE — PROGRESS NOTES
Problem: Falls - Risk of  Goal: *Absence of Falls  Description: Document Evan De La Fuente Fall Risk and appropriate interventions in the flowsheet. Outcome: Progressing Towards Goal  Note: Fall Risk Interventions:  Mobility Interventions: Bed/chair exit alarm, PT Consult for mobility concerns, PT Consult for assist device competence, Patient to call before getting OOB         Medication Interventions: Patient to call before getting OOB, Teach patient to arise slowly, Bed/chair exit alarm    Elimination Interventions: Bed/chair exit alarm, Call light in reach    History of Falls Interventions: Bed/chair exit alarm         Problem: Patient Education: Go to Patient Education Activity  Goal: Patient/Family Education  Outcome: Progressing Towards Goal     Problem: Pressure Injury - Risk of  Goal: *Prevention of pressure injury  Description: Document Baron Scale and appropriate interventions in the flowsheet. Outcome: Progressing Towards Goal  Note: Pressure Injury Interventions:             Activity Interventions: Increase time out of bed, Pressure redistribution bed/mattress(bed type)    Mobility Interventions: HOB 30 degrees or less    Nutrition Interventions: Document food/fluid/supplement intake                     Problem: Patient Education: Go to Patient Education Activity  Goal: Patient/Family Education  Outcome: Progressing Towards Goal     Problem: Pain  Goal: *Control of Pain  Outcome: Progressing Towards Goal     Problem: Patient Education: Go to Patient Education Activity  Goal: Patient/Family Education  Outcome: Progressing Towards Goal     Problem: Patient Education: Go to Patient Education Activity  Goal: Patient/Family Education  Outcome: Progressing Towards Goal     Problem: Complex Spine Procedure:  Day of Surgery  Goal: Off Pathway (Use only if patient is Off Pathway)  Outcome: Progressing Towards Goal  Goal: Activity/Safety  Outcome: Progressing Towards Goal  Goal: Consults, if ordered  Outcome: Progressing Towards Goal  Goal: Diagnostic Test/Procedures  Outcome: Progressing Towards Goal  Goal: Nutrition/Diet  Outcome: Progressing Towards Goal  Goal: Discharge Planning  Outcome: Progressing Towards Goal  Goal: Medications  Outcome: Progressing Towards Goal  Goal: Respiratory  Outcome: Progressing Towards Goal  Goal: Treatments/Interventions/Procedures  Outcome: Progressing Towards Goal  Goal: Psychosocial  Outcome: Progressing Towards Goal  Goal: *Optimal pain control at patient's stated goal  Outcome: Progressing Towards Goal  Goal: *Demonstrates progressive activity  Outcome: Progressing Towards Goal  Goal: *Respiratory status stable  Outcome: Progressing Towards Goal     Problem: Complex Spine Procedure:  Post Op Day 1  Goal: Off Pathway (Use only if patient is Off Pathway)  Outcome: Progressing Towards Goal  Goal: Activity/Safety  Outcome: Progressing Towards Goal  Goal: Consults, if ordered  Outcome: Progressing Towards Goal  Goal: Diagnostic Test/Procedures  Outcome: Progressing Towards Goal  Goal: Nutrition/Diet  Outcome: Progressing Towards Goal  Goal: Discharge Planning  Outcome: Progressing Towards Goal  Goal: Medications  Outcome: Progressing Towards Goal  Goal: Respiratory  Outcome: Progressing Towards Goal  Goal: Treatments/Interventions/Procedures  Outcome: Progressing Towards Goal  Goal: Psychosocial  Outcome: Progressing Towards Goal  Goal: *Progress independence mobility/activities (eg: Mobility precautions)  Outcome: Progressing Towards Goal  Goal: *Verbalizes/demonstrates understanding of proper body mechanics and use of stabilization device if ordered  Outcome: Progressing Towards Goal  Goal: *Optimal pain control at patient's stated goal  Outcome: Progressing Towards Goal  Goal: *Resumes normal function of bladder and bowel  Outcome: Progressing Towards Goal  Goal: *Hemodynamically stable  Outcome: Progressing Towards Goal  Goal: *Tolerating diet  Outcome: Progressing Towards Goal  Goal: *Labs within defined limits  Outcome: Progressing Towards Goal     Problem: Complex Spine Procedure:  Post Op Day 2  Goal: Off Pathway (Use only if patient is Off Pathway)  Outcome: Progressing Towards Goal  Goal: Activity/Safety  Outcome: Progressing Towards Goal  Goal: Diagnostic Test/Procedures  Outcome: Progressing Towards Goal  Goal: Nutrition/Diet  Outcome: Progressing Towards Goal  Goal: Discharge Planning  Outcome: Progressing Towards Goal  Goal: Medications  Outcome: Progressing Towards Goal  Goal: Respiratory  Outcome: Progressing Towards Goal  Goal: Treatments/Interventions/Procedures  Outcome: Progressing Towards Goal  Goal: Psychosocial  Outcome: Progressing Towards Goal  Goal: *Progress independence mobility/activities (eg: Mobility precautions)  Outcome: Progressing Towards Goal  Goal: *Verbalizes/demonstrates understanding of proper body mechanics and use of stabilization device if ordered  Outcome: Progressing Towards Goal  Goal: *Optimal pain control at patient's stated goal  Outcome: Progressing Towards Goal  Goal: *Resumes normal function of bladder and bowel  Outcome: Progressing Towards Goal  Goal: *Hemodynamically stable  Outcome: Progressing Towards Goal  Goal: *Tolerating diet  Outcome: Progressing Towards Goal  Goal: *Labs within defined limits  Outcome: Progressing Towards Goal  Goal: *Able to place stabilization device  Outcome: Progressing Towards Goal     Problem: Complex Spine Procedure:  Post Op Day 3  Goal: Off Pathway (Use only if patient is Off Pathway)  Outcome: Progressing Towards Goal  Goal: Activity/Safety  Outcome: Progressing Towards Goal  Goal: Nutrition/Diet  Outcome: Progressing Towards Goal  Goal: Discharge Planning  Outcome: Progressing Towards Goal  Goal: Medications  Outcome: Progressing Towards Goal  Goal: Respiratory  Outcome: Progressing Towards Goal  Goal: Treatments/Interventions/Procedures  Outcome: Progressing Towards Goal  Goal: Psychosocial  Outcome: Progressing Towards Goal     Problem: Complex Spine Procedure:  Discharge Outcomes  Goal: *Optimal pain control at patient's stated goal  Outcome: Progressing Towards Goal  Goal: *Demonstrates ability to place and remove stabilization device  Outcome: Progressing Towards Goal  Goal: *Progress independence mobility/activities (eg: Mobility precautions)  Outcome: Progressing Towards Goal  Goal: *Resumes normal function of bladder and bowel  Outcome: Progressing Towards Goal  Goal: *Lungs clear or at baseline  Outcome: Progressing Towards Goal  Goal: *Verbalizes name, dosage, time, side effects, and number of days to continue medications  Outcome: Progressing Towards Goal  Goal: *Modified independence with transfers, ambulation on levels with assistance devices, stair climbing, ADL's  Outcome: Progressing Towards Goal  Goal: *Describes follow-up/return visits to physicians  Outcome: Progressing Towards Goal  Goal: *Describes available resources and support systems  Outcome: Progressing Towards Goal  Goal: *Labs within defined limits  Outcome: Progressing Towards Goal  Goal: *Tolerating diet  Outcome: Progressing Towards Goal

## 2020-12-17 NOTE — PROGRESS NOTES
Bedside shift change report given to [address] [place] (oncoming nurse) by Yenifer Frankel RN (offgoing nurse). Report included the following information SBAR, Intake/Output and Recent Results.

## 2020-12-17 NOTE — PROGRESS NOTES
12/17/2020  3:19 PM  RUR:  11%  Risk Level: [x]Low []Moderate []High  Value-based purchasing: [] Yes [x] No  Bundle patient: [] Yes [x] No   Specify:     Transition of care plan:  1. Awaiting medical clearance and DC order. PT/OT treating. 2. Home with [facility] with [facility]. 3. Pt provided second Medicare letter; however, she did not sign. 4. Outpatient follow-up.   5. Pt's family to transport

## 2020-12-17 NOTE — PROGRESS NOTES
Problem: Falls - Risk of  Goal: *Absence of Falls  Description: Document Robert Going Fall Risk and appropriate interventions in the flowsheet.   Outcome: Progressing Towards Goal  Note: Fall Risk Interventions:  Mobility Interventions: Bed/chair exit alarm, Communicate number of staff needed for ambulation/transfer    Medication Interventions: Patient to call before getting OOB, Teach patient to arise slowly    Elimination Interventions: Bed/chair exit alarm, Call light in reach    History of Falls Interventions: Bed/chair exit alarm    Problem: Patient Education: Go to Patient Education Activity  Goal: Patient/Family Education  Outcome: Progressing Towards Goal

## 2020-12-17 NOTE — PROGRESS NOTES
Problem: Self Care Deficits Care Plan (Adult)  Goal: *Acute Goals and Plan of Care (Insert Text)  Description: FUNCTIONAL STATUS PRIOR TO ADMISSION: Patient was independent and active without use of DME.    HOME SUPPORT: The patient lived with  but did not require assist.    Occupational Therapy Goals  Initiated 12/16/2020    1. Patient will perform lower body dressing and bathing with supervision/set-up using AE PRN within 7 days. 2.  Patient will perform toilet transfers with supervision/set-up using rolling walker within 7 days. 3.  Patient will perform all aspects of toileting at supervision/set-up within 7 days. 4.  Patient will don/doff back brace at modified [place] within 7 days. 5.  Patient will verbalize/demonstrate 3/3 back precautions during ADL tasks without cues within 7 days. Outcome: Progressing Towards Goal  OCCUPATIONAL THERAPY TREATMENT  Patient: Tarun Palacios (44 y.o. female)  Date: 12/17/2020  Diagnosis: Spondylolisthesis of lumbar region [M43.16]  Spinal stenosis of lumbar region with neurogenic claudication [M48.062]  Lumbar stenosis with neurogenic claudication [M48.062] <principal problem not specified>  Procedure(s) (LRB):  L2-S1 LAMINECTOMY, L2-L5 FUSION,  INSTRUMENTATION, TLIF, BONE GRAFT (N/A) 2 Days Post-Op  Precautions:    Chart, occupational therapy assessment, plan of care, and goals were reviewed. ASSESSMENT  Patient continues with skilled OT services and is progressing towards goals. Patient tolerated ADL activity well with additional time required to complete task, though minimal rest breaks. Patient verbalizes and demonstrates good understanding of all safety precautions, and has good problem solving skills to address transition home with back precautions. Limited somewhat by pain today, though participated well.      Current Level of Function Impacting Discharge (ADLs): Min A LB ADL- training required for efficient AE use, CGA in stand for safety Other factors to consider for discharge: Spouse is available for assistance          PLAN :  Patient continues to benefit from skilled intervention to address the above impairments. Continue treatment per established plan of care. to address goals. Recommend with staff: ADL participation     Recommend next OT session: continue POC    Recommendation for discharge: (in order for the patient to meet his/her long term goals)  No skilled occupational therapy/ follow up rehabilitation needs identified at this time. This discharge recommendation:  Has not yet been discussed the attending provider and/or case management    IF patient discharges home will need the following DME: may need tub bench- patient educated on online availability        SUBJECTIVE:   Patient stated I'd love to get cleaned up.     OBJECTIVE DATA SUMMARY:   Cognitive/Behavioral Status:  Neurologic State: Alert  Orientation Level: Oriented X4  Cognition: Follows commands; Appropriate decision making  Perception: Appears intact  Perseveration: No perseveration noted  Safety/Judgement: Awareness of environment; Insight into deficits    Functional Mobility and Transfers for ADLs:  Bed Mobility:       Transfers:     Functional Transfers  Bathroom Mobility: Contact guard assistance  Toilet Transfer : Contact guard assistance       Balance:  Sitting: Intact; Without support  Standing: Intact; Without support    ADL Intervention:            Upper Body Bathing  Bathing Assistance: Minimum assistance;Supervision  Position Performed: Seated in chair  Cues: Verbal cues provided    Lower Body Bathing  Bathing Assistance: Minimum assistance  Perineal  : Supervision  Position Performed: Seated on toilet  Cues: Verbal cues provided  Lower Body : Minimum assistance  Position Performed: Seated in chair  Cues: Verbal cues provided    Upper Body [address] Gown: Set-up    Lower Body Dressing Assistance  Dressing Assistance: Contact guard assistance  Underpants: Contact guard assistance  Position Performed: Seated in chair  Adaptive Equipment Used: Reacher    Toileting  Toileting Assistance: Contact guard assistance  Bladder Hygiene: Supervision  Bowel Hygiene: Contact guard assistance  Clothing Management: Contact guard assistance  Adaptive Equipment: Grab bars; Reacher    Cognitive Retraining  Safety/Judgement: Awareness of environment; Insight into deficits    The patient recalled and demonstrated 3/3 back precautions. Reviewed all 3 with patient. Bathing: Patient instructed and indicated understanding when bathing to not submerge wound in water, stand to shower or sponge bathe, cover wound with plastic and tape to ensure no water reaches bandage/wound without cues. Dressing brace: Patient instructed and demonstrated to don/doff velcro on brace using dominant side, keeping non-dominant side intact. Patient instructed and demonstrated in meantime of being able to stand with back against wall to don/doff brace, to don/doff seated using lap and bed/chair surface to support brace while manipulating. Dressing lower body: Patient instructed to don brace first and on the benefits to remain seated to don all clothing to increase independence with precautions and pain management. Toileting: Patient instructed on the benefits of using flushable wet wipes and toilet tongs if decreased reach or pain for norbert care. Also, the benefits of a reacher to aid in clothing management. Home safety: Patient instructed and indicated understanding on home modifications and safety (raise height of ADL objects, appropriate height of chair surfaces, recliner safety, change of floor surfaces, clear pathways) to increase independence and fall prevention with understanding reported.     Standing: Patient instructed and indicated understanding to walk up to sink/counter top/surfaces, step into walker, square off while using objects, slide objects along surfaces, to increase adherence to back precautions and fall prevention. Patient instructed to increase amount of time standing in order to increase independence and tolerance with ADLs. During prolonged standing, can open cabinet door or place foot on stool to decrease spinal pressure/increase pain. Tub transfer: Patient instructed and indicated understanding regarding when it is safe to begin transfer into tub (complete stairs with PT, advance exercises with PT high enough to clear tub height, and while clothes donned practice with another person present). Patient instructed and indicated understanding to use the same technique as used with stairs when entering and exiting tub (\"up with good leg, down with bad leg\"). Patient instructed and indicated understanding the benefits of maintaining activity tolerance, functional mobility, and independence with self care tasks during acute stay  to ensure safe return home and to baseline. Encouraged patient to increase frequency and duration OOB, not sitting longer than 30 mins without marching/walking with staff, be out of bed for all meals, perform daily ADLs (as approved by RN/MD regarding bathing etc), and performing functional mobility to/from bathroom. Patient instruction and indicated understanding on body mechanics, ergonomics and gravitational force on the spine during different body positions to plan activities in prep for return home to complete instrumental ADLs and back to work safely. Pain:  3/10 lower back    Activity Tolerance:   requires rest breaks    After treatment patient left in no apparent distress:   Sitting in chair and Call bell within reach    COMMUNICATION/COLLABORATION:   The patients plan of care was discussed with: Registered nurse.      Latia Gómez OT  Time Calculation: 45 mins

## 2020-12-17 NOTE — PROGRESS NOTES
Problem: Mobility Impaired (Adult and Pediatric)  Goal: *Acute Goals and Plan of Care (Insert Text)  Description: FUNCTIONAL STATUS PRIOR TO ADMISSION: Patient was modified independent using a single point cane for functional mobility. HOME SUPPORT PRIOR TO ADMISSION: The patient lived with spouse but did not require assist.    Physical Therapy Goals  Initiated 12/16/2020    1. Patient will move from supine to sit and sit to supine , scoot up and down, and roll side to side in bed with independence within 4 days. 2. Patient will perform sit to stand with independence within 4 days. 3. Patient will ambulate with modified independence for 200 feet with the least restrictive device within 4 days. 4. Patient will verbalize and demonstrate understanding of spinal precautions (No bending, lifting greater than 5 lbs, or twisting; log-roll technique; frequent repositioning as instructed) within 4 days. 12/17/2020 1616 by [facility]  Outcome: Progressing Towards Goal  Note:   PHYSICAL THERAPY TREATMENT  Patient: William Carreno (63 y.o. female)  Date: 12/17/2020  Diagnosis: Spondylolisthesis of lumbar region [M43.16]  Spinal stenosis of lumbar region with neurogenic claudication [M48.062]  Lumbar stenosis with neurogenic claudication [M48.062] <principal problem not specified>  Procedure(s) (LRB):  L2-S1 LAMINECTOMY, L2-L5 FUSION,  INSTRUMENTATION, TLIF, BONE GRAFT (N/A) 2 Days Post-Op  Precautions:   No bending, no lifting greater than 5 lbs, no twisting, log-roll technique, repositioning every 20-30 min except when sleeping, brace when OOB (if ordered)  Chart, physical therapy assessment, plan of care and goals were reviewed. ASSESSMENT  Patient continues with skilled PT services and is progressing towards goals. Requires up to Min A for bed mobility to manage BLE for sit<>supine on flat bed surface without handrails. CGA for functional transfers and gait training using RW on level surfaces.  Pt has zero stairs to enter or within the home and is cleared for discharge emma medically stable. Current Level of Function Impacting Discharge (mobility/balance): up to Min A    Other factors to consider for discharge: none         PLAN :  Patient continues to benefit from skilled intervention to address the above impairments. Continue treatment per established plan of care. to address goals. Recommendation for discharge: (in order for the patient to meet his/her long term goals)  Physical therapy at least 2 days/week in the home     This discharge recommendation:  Has been made in collaboration with the attending provider and/or case management    IF patient discharges home will need the following DME: rolling walker       SUBJECTIVE:   Patient stated doing better than this morning. Neda Eb    OBJECTIVE DATA SUMMARY:   Critical Behavior:  Neurologic State: Alert  Orientation Level: Oriented X4  Cognition: Follows commands, Appropriate decision making  Safety/Judgement: Awareness of environment, Insight into deficits    Spinal diagnosis intervention:  The patient stated 3/3 back precautions when prompted. Reviewed all 3 back precautions, log roll technique, and sitting for 30 minutes at a time. The patient required occasional cues to maintain back precautions during functional activity. Reviewed back brace application and wear schedule. Brace donned with supervision/set-up      Functional Mobility Training:    Bed Mobility:  Log Rolling: Contact guard assistance  Supine to Sit: Minimum assistance;Assist x1  Sit to Supine: Minimum assistance  Scooting: Contact guard assistance; Additional time        Transfers:  Sit to Stand: Contact guard assistance; Additional time  Stand to Sit: Contact guard assistance; Additional time                             Balance:  Sitting: Intact  Standing: Intact; With support  Ambulation/Gait Training:  Distance (ft): 80 Feet (ft)  Assistive Device: Walker, rolling;Gait belt  Ambulation - Level of Assistance: Contact guard assistance;Stand-by assistance        Gait Abnormalities: Decreased step clearance;Trunk sway increased        Base of Support: Narrowed     Speed/May: Pace decreased (<100 feet/min)                       Stairs: Therapeutic Exercises:     Pain Rating:  3/10    Activity Tolerance:   Good    After treatment patient left in no apparent distress:   Sitting in chair and Call bell within reach    COMMUNICATION/COLLABORATION:   The patients plan of care was discussed with: Registered nurseJean Evans   Time Calculation: 25 mins          12/17/2020 1115 by Nevaeh Murphy  Outcome: Progressing Towards Goal  Note:   PHYSICAL THERAPY TREATMENT  Patient: Chriss Oneal (31 y.o. female)  Date: 12/17/2020  Diagnosis: Spondylolisthesis of lumbar region [M43.16]  Spinal stenosis of lumbar region with neurogenic claudication [M48.062]  Lumbar stenosis with neurogenic claudication [M48.062] <principal problem not specified>  Procedure(s) (LRB):  L2-S1 LAMINECTOMY, L2-L5 FUSION,  INSTRUMENTATION, TLIF, BONE GRAFT (N/A) 2 Days Post-Op  Precautions:   No bending, no lifting greater than 5 lbs, no twisting, log-roll technique, repositioning every 20-30 min except when sleeping, brace when OOB (if ordered)  Chart, physical therapy assessment, plan of care and goals were reviewed. ASSESSMENT  Patient continues with skilled PT services and is progressing towards goals. HGB 6.9 mild dyspnea noted with gait training . Denies dizziness, lightheadedness during activity. Pt verbalized back precautions and wear schedule for LSO. Pt has zero stairs to enter of within home, pt is cleared for discharge from PT standpoint when medically stable Pt remained in chair at end of session.     Current Level of Function Impacting Discharge (mobility/balance): CGA    Other factors to consider for discharge: none         PLAN :  Patient continues to benefit from skilled intervention to address the above impairments. Continue treatment per established plan of care. to address goals. Recommendation for discharge: (in order for the patient to meet his/her long term goals)  Physical therapy at least 2 days/week in the home     This discharge recommendation:  Has been made in collaboration with the attending provider and/or case management    IF patient discharges home will need the following DME: rolling walker       SUBJECTIVE:   Patient stated i am feeling  more stiff today in my back.     OBJECTIVE DATA SUMMARY:   Critical Behavior:  Neurologic State: Alert  Orientation Level: Oriented X4  Cognition: Follows commands, Appropriate decision making  Safety/Judgement: Awareness of environment, Insight into deficits    Spinal diagnosis intervention:  The patient stated 3/3 back precautions when prompted. Reviewed all 3 back precautions, log roll technique, and sitting for 30 minutes at a time. The patient required few cues to maintain back precautions during functional activity. Reviewed back brace application and wear schedule. Brace donned with supervision/set-up      Functional Mobility Training:    Bed Mobility:  Log                   Transfers:  Sit to Stand: Contact guard assistance  Stand to Sit: Contact guard assistance                             Balance:  Sitting: Intact  Standing: Intact; With support  Ambulation/Gait Training:  Distance (ft): 80 Feet (ft)  Assistive Device: Walker, rolling;Gait belt  Ambulation - Level of Assistance: Contact guard assistance        Gait Abnormalities: Decreased step clearance;Trunk sway increased        Base of Support: Narrowed     Speed/May: Pace decreased (<100 feet/min)                       Stairs:               Therapeutic Exercises:     Pain Ratin/10    Activity Tolerance:   Good    After treatment patient left in no apparent distress:   Sitting in chair and Call bell within reach    COMMUNICATION/COLLABORATION:   The patients plan of care was discussed with: Registered nurse.      Claudette Dama   Time Calculation: 18 mins

## 2020-12-17 NOTE — PROGRESS NOTES
ORTHOPAEDIC LUMBAR FUSION PROGRESS NOTE    NAME:     Pennie Willams   :       1945   MRN:       [id number]   DATE:      2020    POD:    2 Days Post-Op  S/P:    Procedure(s):  L2-S1 LAMINECTOMY, L2-L5 FUSION,  INSTRUMENTATION, TLIF, BONE GRAFT    SUBJECTIVE:    C/O back pain along surgical incision  No leg pain or numbness  Denies nausea/vomiting, headache, chest pain or shortness of breath  Pain controlled    Recent Labs     20  0233 20  0836   HGB 6.9* 7.4*   HCT  --  26.1*     --    K 4.2  --    *  --    CO2 26  --    BUN 14  --    CREA 1.00  --    *  --      Patient Vitals for the past 12 hrs:   BP Temp Pulse Resp SpO2   20 0757 126/72 99.2 °F (37.3 °C) 97 16 98 %   20 0302 103/64 98.3 °F (36.8 °C) 95 16 98 %   20 2243 128/79 98.4 °F (36.9 °C) 80 16 100 %       EXAM:  Dressings clean, dry and intact   Positive strength/ROM bilat lower ext.   Neuro intact to sensation  Calves, soft & nontender  BL LEs NVID      PLAN:  hgb 6.9- CBC pending   Continue prn PO pain medications  PT/OT, OOB w/ assist  Tolerating diet  Monitor hemovac drain output      Katelyn Bender, [address]  Orthopaedic Surgery  Physician Assistant to Dr. Dede Torres

## 2020-12-18 LAB
ANION GAP SERPL CALC-SCNC: 5 MMOL/L (ref 5–15)
BUN SERPL-MCNC: 10 MG/DL (ref 6–20)
BUN/CREAT SERPL: 13 (ref 12–20)
CALCIUM SERPL-MCNC: 8.2 MG/DL (ref 8.5–10.1)
CHLORIDE SERPL-SCNC: 109 MMOL/L (ref 97–108)
CO2 SERPL-SCNC: 22 MMOL/L (ref 21–32)
CREAT SERPL-MCNC: 0.76 MG/DL (ref 0.55–1.02)
ERYTHROCYTE [DISTWIDTH] IN BLOOD BY AUTOMATED COUNT: 20.2 % (ref 11.5–14.5)
ERYTHROCYTE [DISTWIDTH] IN BLOOD BY AUTOMATED COUNT: 20.3 % (ref 11.5–14.5)
GLUCOSE SERPL-MCNC: 140 MG/DL (ref 65–100)
HCT VFR BLD AUTO: 21.7 % (ref 35–47)
HCT VFR BLD AUTO: 23.3 % (ref 35–47)
HGB BLD-MCNC: 6.3 G/DL (ref 11.5–16)
HGB BLD-MCNC: 6.4 G/DL (ref 11.5–16)
HGB BLD-MCNC: 6.8 G/DL (ref 11.5–16)
HISTORY CHECKED?,CKHIST: NORMAL
MCH RBC QN AUTO: 24.9 PG (ref 26–34)
MCH RBC QN AUTO: 25 PG (ref 26–34)
MCHC RBC AUTO-ENTMCNC: 29 G/DL (ref 30–36.5)
MCHC RBC AUTO-ENTMCNC: 29.2 G/DL (ref 30–36.5)
MCV RBC AUTO: 85.7 FL (ref 80–99)
MCV RBC AUTO: 85.8 FL (ref 80–99)
NRBC # BLD: 0.02 K/UL (ref 0–0.01)
NRBC # BLD: 0.03 K/UL (ref 0–0.01)
NRBC BLD-RTO: 0.2 PER 100 WBC
NRBC BLD-RTO: 0.3 PER 100 WBC
PLATELET # BLD AUTO: 240 K/UL (ref 150–400)
PLATELET # BLD AUTO: 261 K/UL (ref 150–400)
PMV BLD AUTO: 10.9 FL (ref 8.9–12.9)
PMV BLD AUTO: 9.5 FL (ref 8.9–12.9)
POTASSIUM SERPL-SCNC: 4.2 MMOL/L (ref 3.5–5.1)
RBC # BLD AUTO: 2.53 M/UL (ref 3.8–5.2)
RBC # BLD AUTO: 2.72 M/UL (ref 3.8–5.2)
SODIUM SERPL-SCNC: 136 MMOL/L (ref 136–145)
WBC # BLD AUTO: 9.5 K/UL (ref 3.6–11)
WBC # BLD AUTO: 9.9 K/UL (ref 3.6–11)

## 2020-12-18 PROCEDURE — 2709999900 HC NON-CHARGEABLE SUPPLY

## 2020-12-18 PROCEDURE — 65270000029 HC RM PRIVATE

## 2020-12-18 PROCEDURE — 30233N1 TRANSFUSION OF NONAUTOLOGOUS RED BLOOD CELLS INTO PERIPHERAL VEIN, PERCUTANEOUS APPROACH: ICD-10-PCS | Performed by: ORTHOPAEDIC SURGERY

## 2020-12-18 PROCEDURE — 74011250637 HC RX REV CODE- 250/637: Performed by: PHYSICIAN ASSISTANT

## 2020-12-18 PROCEDURE — 97116 GAIT TRAINING THERAPY: CPT

## 2020-12-18 PROCEDURE — 80048 BASIC METABOLIC PNL TOTAL CA: CPT

## 2020-12-18 PROCEDURE — 97535 SELF CARE MNGMENT TRAINING: CPT

## 2020-12-18 PROCEDURE — 74011000250 HC RX REV CODE- 250: Performed by: ORTHOPAEDIC SURGERY

## 2020-12-18 PROCEDURE — 74011250637 HC RX REV CODE- 250/637: Performed by: ORTHOPAEDIC SURGERY

## 2020-12-18 PROCEDURE — 36430 TRANSFUSION BLD/BLD COMPNT: CPT

## 2020-12-18 PROCEDURE — 94760 N-INVAS EAR/PLS OXIMETRY 1: CPT

## 2020-12-18 PROCEDURE — P9016 RBC LEUKOCYTES REDUCED: HCPCS

## 2020-12-18 PROCEDURE — 74011250637 HC RX REV CODE- 250/637: Performed by: NURSE PRACTITIONER

## 2020-12-18 PROCEDURE — 36415 COLL VENOUS BLD VENIPUNCTURE: CPT

## 2020-12-18 PROCEDURE — 97530 THERAPEUTIC ACTIVITIES: CPT

## 2020-12-18 PROCEDURE — 85027 COMPLETE CBC AUTOMATED: CPT

## 2020-12-18 PROCEDURE — 85018 HEMOGLOBIN: CPT

## 2020-12-18 PROCEDURE — 94640 AIRWAY INHALATION TREATMENT: CPT

## 2020-12-18 RX ORDER — SODIUM CHLORIDE 9 MG/ML
250 INJECTION, SOLUTION INTRAVENOUS AS NEEDED
Status: DISCONTINUED | OUTPATIENT
Start: 2020-12-18 | End: 2020-12-21 | Stop reason: HOSPADM

## 2020-12-18 RX ORDER — ADHESIVE BANDAGE
30 BANDAGE TOPICAL
Status: COMPLETED | OUTPATIENT
Start: 2020-12-18 | End: 2020-12-18

## 2020-12-18 RX ADMIN — Medication 10 ML: at 22:00

## 2020-12-18 RX ADMIN — DOCUSATE SODIUM 50MG AND SENNOSIDES 8.6MG 1 TABLET: 8.6; 5 TABLET, FILM COATED ORAL at 17:03

## 2020-12-18 RX ADMIN — PANTOPRAZOLE SODIUM 40 MG: 40 TABLET, DELAYED RELEASE ORAL at 15:41

## 2020-12-18 RX ADMIN — POLYETHYLENE GLYCOL 3350 17 G: 17 POWDER, FOR SOLUTION ORAL at 08:55

## 2020-12-18 RX ADMIN — MAGNESIUM HYDROXIDE 30 ML: 2400 SUSPENSION ORAL at 08:55

## 2020-12-18 RX ADMIN — CETIRIZINE HYDROCHLORIDE 10 MG: 10 TABLET, FILM COATED ORAL at 08:56

## 2020-12-18 RX ADMIN — MONTELUKAST SODIUM 10 MG: 10 TABLET, FILM COATED ORAL at 21:59

## 2020-12-18 RX ADMIN — ARFORMOTEROL TARTRATE: 15 SOLUTION RESPIRATORY (INHALATION) at 08:00

## 2020-12-18 RX ADMIN — CHOLECALCIFEROL TAB 25 MCG (1000 UNIT) 1 TABLET: 25 TAB at 09:00

## 2020-12-18 RX ADMIN — PANTOPRAZOLE SODIUM 40 MG: 40 TABLET, DELAYED RELEASE ORAL at 06:41

## 2020-12-18 RX ADMIN — Medication 400 MG: at 08:56

## 2020-12-18 RX ADMIN — FERROUS SULFATE TAB 325 MG (65 MG ELEMENTAL FE) 325 MG: 325 (65 FE) TAB at 08:55

## 2020-12-18 RX ADMIN — DOCUSATE SODIUM 50MG AND SENNOSIDES 8.6MG 1 TABLET: 8.6; 5 TABLET, FILM COATED ORAL at 08:55

## 2020-12-18 RX ADMIN — Medication 10 ML: at 06:34

## 2020-12-18 RX ADMIN — OXYCODONE 5 MG: 5 TABLET ORAL at 06:41

## 2020-12-18 RX ADMIN — FERROUS SULFATE TAB 325 MG (65 MG ELEMENTAL FE) 325 MG: 325 (65 FE) TAB at 16:16

## 2020-12-18 RX ADMIN — OXYCODONE 5 MG: 5 TABLET ORAL at 12:08

## 2020-12-18 RX ADMIN — OYSTER SHELL CALCIUM WITH VITAMIN D 1 TABLET: 500; 200 TABLET, FILM COATED ORAL at 16:16

## 2020-12-18 RX ADMIN — OXYCODONE 5 MG: 5 TABLET ORAL at 15:41

## 2020-12-18 RX ADMIN — OYSTER SHELL CALCIUM WITH VITAMIN D 1 TABLET: 500; 200 TABLET, FILM COATED ORAL at 08:56

## 2020-12-18 RX ADMIN — OXYCODONE 5 MG: 5 TABLET ORAL at 21:59

## 2020-12-18 RX ADMIN — Medication 10 ML: at 13:55

## 2020-12-18 RX ADMIN — ATORVASTATIN CALCIUM 10 MG: 10 TABLET, FILM COATED ORAL at 08:56

## 2020-12-18 RX ADMIN — ARFORMOTEROL TARTRATE: 15 SOLUTION RESPIRATORY (INHALATION) at 20:18

## 2020-12-18 RX ADMIN — LOSARTAN POTASSIUM 100 MG: 50 TABLET, FILM COATED ORAL at 21:59

## 2020-12-18 RX ADMIN — CHOLECALCIFEROL TAB 25 MCG (1000 UNIT) 1 TABLET: 25 TAB at 17:03

## 2020-12-18 NOTE — PROGRESS NOTES
Problem: Mobility Impaired (Adult and Pediatric)  Goal: *Acute Goals and Plan of Care (Insert Text)  Description: FUNCTIONAL STATUS PRIOR TO ADMISSION: Patient was modified independent using a single point cane for functional mobility. HOME SUPPORT PRIOR TO ADMISSION: The patient lived with spouse but did not require assist.    Physical Therapy Goals  Initiated 12/16/2020    1. Patient will move from supine to sit and sit to supine , scoot up and down, and roll side to side in bed with independence within 4 days. 2. Patient will perform sit to stand with independence within 4 days. 3. Patient will ambulate with modified independence for 200 feet with the least restrictive device within 4 days. 4. Patient will verbalize and demonstrate understanding of spinal precautions (No bending, lifting greater than 5 lbs, or twisting; log-roll technique; frequent repositioning as instructed) within 4 days. Outcome: Progressing Towards Goal  Note:   PHYSICAL THERAPY TREATMENT  Patient: Arthur Plummer (88 y.o. female)  Date: 12/18/2020  Diagnosis: Spondylolisthesis of lumbar region [M43.16]  Spinal stenosis of lumbar region with neurogenic claudication [M48.062]  Lumbar stenosis with neurogenic claudication [M48.062] <principal problem not specified>  Procedure(s) (LRB):  L2-S1 LAMINECTOMY, L2-L5 FUSION,  INSTRUMENTATION, TLIF, BONE GRAFT (N/A) 3 Days Post-Op  Precautions:   No bending, no lifting greater than 5 lbs, no twisting, log-roll technique, repositioning every 20-30 min except when sleeping, brace when OOB (if ordered)  Chart, physical therapy assessment, plan of care and goals were reviewed. ASSESSMENT  Patient continues with skilled PT services and is progressing towards goals. HBG 6.3 mild dyspnea with activity, denies dizziness, lightheadedness CGA for functional mobility using RW for steadying. Pt returned to chair. Pt is cleared for discharge when medically stable.     Current Level of Function Impacting Discharge (mobility/balance): CGA    Other factors to consider for discharge:          PLAN :  Patient continues to benefit from skilled intervention to address the above impairments. Continue treatment per established plan of care. to address goals. Recommendation for discharge: (in order for the patient to meet his/her long term goals)  Physical therapy at least 2 days/week in the home     This discharge recommendation:  Has been made in collaboration with the attending provider and/or case management    IF patient discharges home will need the following DME: rolling walker       SUBJECTIVE:   Patient stated Allyne Sample feel stronger today.     OBJECTIVE DATA SUMMARY:   Critical Behavior:  Neurologic State: Alert  Orientation Level: Oriented X4  Cognition: Appropriate decision making, Appropriate for age attention/concentration, Appropriate safety awareness  Safety/Judgement: Awareness of environment, Insight into deficits    Spinal diagnosis intervention:  The patient stated 3/3 back precautions when prompted. Reviewed all 3 back precautions, log roll technique, and sitting for 30 minutes at a time. The patient required few cues to maintain back precautions during functional activity. Reviewed back brace application and wear schedule. Brace donned with supervision/set-up      Functional Mobility Training:    Bed Mobility:  Log          Scooting: Contact guard assistance; Additional time        Transfers:  Sit to Stand: Contact guard assistance;Minimum assistance;Assist x1  Stand to Sit: Contact guard assistance;Stand-by assistance                             Balance:  Sitting: Intact  Standing: Intact; With support  Ambulation/Gait Training:  Distance (ft): 100 Feet (ft)  Assistive Device: Walker, rolling;Gait belt;Brace/Splint  Ambulation - Level of Assistance: Contact guard assistance        Gait Abnormalities: Decreased step clearance        Base of Support: Narrowed     Speed/May: Pace decreased (<100 feet/min)                       Stairs: Therapeutic Exercises:     Pain Ratin/10    Activity Tolerance:   Good    After treatment patient left in no apparent distress:   Sitting in chair and Call bell within reach    COMMUNICATION/COLLABORATION:   The patients plan of care was discussed with: Registered nurseJean Middleton   Time Calculation: 29 mins

## 2020-12-18 NOTE — PROGRESS NOTES
Problem: Self Care Deficits Care Plan (Adult)  Goal: *Acute Goals and Plan of Care (Insert Text)  Description: FUNCTIONAL STATUS PRIOR TO ADMISSION: Patient was independent and active without use of DME.    HOME SUPPORT: The patient lived with  but did not require assist.    Occupational Therapy Goals  Initiated 12/16/2020    1. Patient will perform lower body dressing and bathing with supervision/set-up using AE PRN within 7 days. 2.  Patient will perform toilet transfers with supervision/set-up using rolling walker within 7 days. 3.  Patient will perform all aspects of toileting at supervision/set-up within 7 days. 4.  Patient will don/doff back brace at modified independence within 7 days. 5.  Patient will verbalize/demonstrate 3/3 back precautions during ADL tasks without cues within 7 days. Outcome: Progressing Towards Goal   OCCUPATIONAL THERAPY TREATMENT  Patient: Dedrick Valente (17 y.o. female)  Date: 12/18/2020  Diagnosis: Spondylolisthesis of lumbar region [M43.16]  Spinal stenosis of lumbar region with neurogenic claudication [M48.062]  Lumbar stenosis with neurogenic claudication [M48.062] <principal problem not specified>  Procedure(s) (LRB):  L2-S1 LAMINECTOMY, L2-L5 FUSION,  INSTRUMENTATION, TLIF, BONE GRAFT (N/A) 3 Days Post-Op  Precautions:    Chart, occupational therapy assessment, plan of care, and goals were reviewed. ASSESSMENT  Patient continues with skilled OT services and is progressing towards goals. Pt seated in chair, verbalized all her back precautions and educated as to AE for bathing and dressing. She was able to doff/don socks with use of dressing stick, reacher, sock aide. Pt contact guard for ADL related mobility. Pt is clear from an OT standpoint. Current Level of Function Impacting Discharge (ADLs):  Mod I LB dress, contact guard for ADl related mobility    Other factors to consider for discharge:          PLAN :  Patient continues to benefit from skilled intervention to address the above impairments. Continue treatment per established plan of care. to address goals. Recommend with staff: Out of bed for meals, ADl's, therapeutic activity    Recommend next OT session: Pt clear from an OT standpoint    Recommendation for discharge: (in order for the patient to meet his/her long term goals)  No skilled occupational therapy/ follow up rehabilitation needs identified at this time. This discharge recommendation:  Has not yet been discussed the attending provider and/or case management    IF patient discharges home will need the following DME:       SUBJECTIVE:   Patient stated  I like this .  regarding sock aide    OBJECTIVE DATA SUMMARY:   Cognitive/Behavioral Status:  Neurologic State: Alert  Orientation Level: Oriented X4  Cognition: Appropriate decision making             Functional Mobility and Transfers for ADLs:  Bed Mobility:  Scooting: Contact guard assistance; Additional time    Transfers:  Sit to Stand: Contact guard assistance;Minimum assistance;Assist x1          Balance:  Sitting: Intact  Standing: Intact; With support    ADL Intervention:       Lower Body Dressing Assistance  Socks: Modified independent  Leg Crossed Method Used: No  Position Performed: Seated in chair  Adaptive Equipment Used: Sock aid         Activity Tolerance:   Good    After treatment patient left in no apparent distress:   Sitting in chair    COMMUNICATION/COLLABORATION:   The patients plan of care was discussed with: Physical therapy assistant and Occupational therapist.     GIORGIO Salazar  Time Calculation: 20 mins

## 2020-12-18 NOTE — PROGRESS NOTES
1:15 PM  12/18/20     RUR 13%    Transition care plan     1. Patient to go home with [facility]  2. Patient will follow up with PCP and specialities   2. Patient given [facility]   4.  [facility] will transport   5.  CM will continue to follow for discharge       Faiza Melo RN CCM

## 2020-12-18 NOTE — PROGRESS NOTES
On call DR Rosalva Ewing) notified of patients HGB at 6.4 this morning with AM labs; patient asymptomatic ; no new orders at this time until morning team assesses

## 2020-12-18 NOTE — PROGRESS NOTES
Bedside and Verbal shift change report given to Patrick Hernandez (oncoming nurse) by Stan Stockton (offgoing nurse). Report included the following information SBAR, Kardex, Intake/Output, MAR and Recent Results.

## 2020-12-18 NOTE — PROGRESS NOTES
Comprehensive Nutrition Assessment    Type and Reason for Visit: RD nutrition re-screen/LOS    Nutrition Recommendations/Plan:   Continue current diet order  Consider checking B12, replace PRN  Consider reducing Vit D3 to 1,000iu/d, recheck in 3-6 months    Nutrition Assessment:     69 yo female admitted for Spinal stenosis of lumbar region with neurogenic claudication POD 3 from laminectomy & fusion. Pt has a past medical history of Asthma, Chronic sinusitis, Fibromyalgia, GERD,HTN, Leg wound, left (11/1/20 to present), Morbid obesity with BMI of 40.0-44.9 (12/09/2020), Multiple allergies, Obstructive sleep apnea on CPAP, Osteopenia, and RA. RD visited with patient. She was sitting in chair in room. Pt reports good appetite and intake. Has some constipation and was given PRN medications for this. Pt is taught how to order meals and provided a menu to use. No c/o, likes meals and stable wt for several years. H/H down - she expected with hx of RA. Otherwise labs unremarkable. Malnutrition Assessment:  Malnutrition Status:  No malnutrition      Estimated Daily Nutrient Needs:  Energy (kcal): 2631-4082 kcal/d (MSJ xAF 1.2 - 1.3); Weight Used for Energy Requirements: Admission(96.4 kg)  Protein (g): 77-106g (0.8-1.1g/kg); Weight Used for Protein Requirements: Current(96.4 kg)  Fluid (ml/day): 1700ml; Method Used for Fluid Requirements: 1 ml/kcal    Nutrition Related Findings:    Last 12/15; ABD: soft, obese, flatus; Edema: none noted      Nutr.  Related Meds: os-winifred +D3, statin, D3 2,000iu/d, Iron, mag-ox, protonix, oxy PRN, pericolace, miralax, MOM (PRN)     Wounds:    Surgical incision       Current Nutrition Therapies:  DIET REGULAR    Meal Intake documented:   Patient Vitals for the past 168 hrs:   % Diet Eaten   12/16/20 1411 85 %   12/16/20 0810 100 %     Anthropometric Measures:  · Height:  5' 1\" (154.9 cm)  · Current Body Wt:  96.4 kg (212 lb 8.4 oz)   · Admission Body Wt:       · Usual Body Wt: · Ideal Body Wt:  105 lbs:  202.4 %   · Adjusted Body Weight:   ; Weight Adjustment for: No adjustment   · Adjusted BMI:       · BMI Category:  Obese class 3 (BMI 40.0 or greater)     Body mass index is 40.16 kg/m².     Wt Readings from Last 10 Encounters:   12/15/20 96.4 kg (212 lb 8.4 oz)   12/09/20 96.4 kg (212 lb 8 oz)   01/24/20 101.6 kg (224 lb)   10/24/19 101.6 kg (224 lb)   07/23/19 99.8 kg (220 lb)   04/23/19 99.8 kg (220 lb)   01/21/19 98 kg (216 lb)   09/28/17 95.3 kg (210 lb)   05/18/17 98 kg (216 lb)   02/20/17 99.4 kg (219 lb 3.2 oz)   ]    Nutrition Diagnosis:   · No nutrition diagnosis at this time          Nutrition Interventions:   Food and/or Nutrient Delivery: Continue current diet  Nutrition Education and Counseling: No recommendations at this time  Coordination of Nutrition Care: No recommendation at this time    Goals:  n/a       Nutrition Monitoring and Evaluation:   Behavioral-Environmental Outcomes: None identified  Food/Nutrient Intake Outcomes: Food and nutrient intake  Physical Signs/Symptoms Outcomes: Biochemical data, Fluid status or edema, Constipation - mild    Discharge Planning:    No discharge needs at this time     Lab Results   Component Value Date/Time    Hemoglobin A1c 5.9 (H) 12/09/2020 12:22 PM    Hemoglobin A1c, External 5.80 10/16/2015     Lab Results   Component Value Date/Time    Vitamin D 25-Hydroxy 57.6 12/09/2020 12:22 PM     No results found for: B12LT, FOL, RBCF      Electronically signed by James Galloway RD, MS on 12/18/2020 at 4:20 PM  Contact: perfect serve or office# [phone]

## 2020-12-18 NOTE — PROGRESS NOTES
ORTHOPAEDIC LUMBAR FUSION PROGRESS NOTE    NAME:     Isaac Tim   :       1945   MRN:       [id number]   DATE:      2020    POD:    3 Days Post-Op  S/P:    Procedure(s):  L2-S1 LAMINECTOMY, L2-L5 FUSION,  INSTRUMENTATION, TLIF, BONE GRAFT    SUBJECTIVE:    C/O back pain along surgical incision  No leg pain or numbness  Denies nausea/vomiting, dizziness, palpitations, near syncope, headache, chest pain or shortness of breath  Pain controlled    Recent Labs     20  0241 20  1040   HGB 6.4* 7.2*   HCT  --  24.9*     --    K 4.2  --    *  --    CO2 22  --    BUN 10  --    CREA 0.76  --    *  --      Patient Vitals for the past 12 hrs:   BP Temp Pulse Resp SpO2   20 0318 135/61 98.9 °F (37.2 °C) 85 16 97 %   20 0001 137/66 99.2 °F (37.3 °C) 90 18 98 %       EXAM:  Dressings clean, dry and intact   Positive strength/ROM bilat lower ext. Neuro intact to sensation  Calves, soft & nontender  BL LEs NVID      PLAN:  CBC pending (added on)  Continue prn PO pain medications  PT/OT, OOB w/ assist  Tolerating diet  Monitor hemovac drain output      7:28 AM  Hgb 6.3 from add on cbc. Will repeat. If still below 7, transfuse.      Maria Fernanda Stover, [address]  Orthopaedic Surgery  Physician Assistant to Dr. Court Singh

## 2020-12-18 NOTE — PROGRESS NOTES
4:42 PM  12/18/20     RW delivered to bedside. [facility]errals accepted by [facility] .  Signed deliver slips sent to [phone] [facility]

## 2020-12-19 ENCOUNTER — APPOINTMENT (OUTPATIENT)
Dept: VASCULAR SURGERY | Age: 75
DRG: 454 | End: 2020-12-19
Attending: STUDENT IN AN ORGANIZED HEALTH CARE EDUCATION/TRAINING PROGRAM
Payer: COMMERCIAL

## 2020-12-19 LAB
ABO + RH BLD: NORMAL
ABO + RH BLD: NORMAL
ALBUMIN SERPL-MCNC: 2.5 G/DL (ref 3.5–5)
ALBUMIN/GLOB SERPL: 0.8 {RATIO} (ref 1.1–2.2)
ALP SERPL-CCNC: 110 U/L (ref 45–117)
ALT SERPL-CCNC: 22 U/L (ref 12–78)
ANION GAP SERPL CALC-SCNC: 2 MMOL/L (ref 5–15)
AST SERPL-CCNC: 31 U/L (ref 15–37)
BILIRUB DIRECT SERPL-MCNC: 0.1 MG/DL (ref 0–0.2)
BILIRUB SERPL-MCNC: 0.6 MG/DL (ref 0.2–1)
BLD PROD TYP BPU: NORMAL
BLOOD GROUP ANTIBODIES SERPL: NORMAL
BLOOD GROUP ANTIBODIES SERPL: NORMAL
BPU ID: NORMAL
BUN SERPL-MCNC: 15 MG/DL (ref 6–20)
BUN/CREAT SERPL: 18 (ref 12–20)
CALCIUM SERPL-MCNC: 8.8 MG/DL (ref 8.5–10.1)
CHLORIDE SERPL-SCNC: 105 MMOL/L (ref 97–108)
CO2 SERPL-SCNC: 32 MMOL/L (ref 21–32)
CREAT SERPL-MCNC: 0.84 MG/DL (ref 0.55–1.02)
CROSSMATCH RESULT,%XM: NORMAL
ERYTHROCYTE [DISTWIDTH] IN BLOOD BY AUTOMATED COUNT: 19.3 % (ref 11.5–14.5)
GLOBULIN SER CALC-MCNC: 3.2 G/DL (ref 2–4)
GLUCOSE SERPL-MCNC: 155 MG/DL (ref 65–100)
HCT VFR BLD AUTO: 24 % (ref 35–47)
HGB BLD-MCNC: 7.2 G/DL (ref 11.5–16)
MCH RBC QN AUTO: 25.8 PG (ref 26–34)
MCHC RBC AUTO-ENTMCNC: 30 G/DL (ref 30–36.5)
MCV RBC AUTO: 86 FL (ref 80–99)
NRBC # BLD: 0.04 K/UL (ref 0–0.01)
NRBC BLD-RTO: 0.4 PER 100 WBC
PLATELET # BLD AUTO: 234 K/UL (ref 150–400)
PMV BLD AUTO: 10.2 FL (ref 8.9–12.9)
POTASSIUM SERPL-SCNC: 5.1 MMOL/L (ref 3.5–5.1)
PROT SERPL-MCNC: 5.7 G/DL (ref 6.4–8.2)
RBC # BLD AUTO: 2.79 M/UL (ref 3.8–5.2)
SODIUM SERPL-SCNC: 139 MMOL/L (ref 136–145)
SPECIMEN EXP DATE BLD: NORMAL
SPECIMEN EXP DATE BLD: NORMAL
STATUS OF UNIT,%ST: NORMAL
UNIT DIVISION, %UDIV: 0
WBC # BLD AUTO: 10.4 K/UL (ref 3.6–11)

## 2020-12-19 PROCEDURE — 94664 DEMO&/EVAL PT USE INHALER: CPT

## 2020-12-19 PROCEDURE — 97116 GAIT TRAINING THERAPY: CPT

## 2020-12-19 PROCEDURE — 80076 HEPATIC FUNCTION PANEL: CPT

## 2020-12-19 PROCEDURE — 85027 COMPLETE CBC AUTOMATED: CPT

## 2020-12-19 PROCEDURE — 77010033678 HC OXYGEN DAILY

## 2020-12-19 PROCEDURE — 74011250636 HC RX REV CODE- 250/636: Performed by: STUDENT IN AN ORGANIZED HEALTH CARE EDUCATION/TRAINING PROGRAM

## 2020-12-19 PROCEDURE — 74011000250 HC RX REV CODE- 250: Performed by: ORTHOPAEDIC SURGERY

## 2020-12-19 PROCEDURE — 80048 BASIC METABOLIC PNL TOTAL CA: CPT

## 2020-12-19 PROCEDURE — 74011250637 HC RX REV CODE- 250/637: Performed by: ORTHOPAEDIC SURGERY

## 2020-12-19 PROCEDURE — 74011250637 HC RX REV CODE- 250/637: Performed by: STUDENT IN AN ORGANIZED HEALTH CARE EDUCATION/TRAINING PROGRAM

## 2020-12-19 PROCEDURE — 65270000029 HC RM PRIVATE

## 2020-12-19 PROCEDURE — 94640 AIRWAY INHALATION TREATMENT: CPT

## 2020-12-19 PROCEDURE — 74011250637 HC RX REV CODE- 250/637: Performed by: NURSE PRACTITIONER

## 2020-12-19 PROCEDURE — 97530 THERAPEUTIC ACTIVITIES: CPT

## 2020-12-19 PROCEDURE — 36415 COLL VENOUS BLD VENIPUNCTURE: CPT

## 2020-12-19 PROCEDURE — 86900 BLOOD TYPING SEROLOGIC ABO: CPT

## 2020-12-19 RX ORDER — MAGNESIUM SULFATE 100 %
4 CRYSTALS MISCELLANEOUS AS NEEDED
Status: DISCONTINUED | OUTPATIENT
Start: 2020-12-19 | End: 2020-12-21 | Stop reason: HOSPADM

## 2020-12-19 RX ORDER — INSULIN LISPRO 100 [IU]/ML
INJECTION, SOLUTION INTRAVENOUS; SUBCUTANEOUS
Status: DISCONTINUED | OUTPATIENT
Start: 2020-12-19 | End: 2020-12-19

## 2020-12-19 RX ORDER — FUROSEMIDE 10 MG/ML
40 INJECTION INTRAMUSCULAR; INTRAVENOUS DAILY
Status: DISCONTINUED | OUTPATIENT
Start: 2020-12-20 | End: 2020-12-21

## 2020-12-19 RX ORDER — FOLIC ACID 1 MG/1
1 TABLET ORAL DAILY
Status: DISCONTINUED | OUTPATIENT
Start: 2020-12-19 | End: 2020-12-21 | Stop reason: HOSPADM

## 2020-12-19 RX ORDER — DEXTROSE 50 % IN WATER (D50W) INTRAVENOUS SYRINGE
12.5-25 AS NEEDED
Status: DISCONTINUED | OUTPATIENT
Start: 2020-12-19 | End: 2020-12-21 | Stop reason: HOSPADM

## 2020-12-19 RX ORDER — FUROSEMIDE 10 MG/ML
40 INJECTION INTRAMUSCULAR; INTRAVENOUS ONCE
Status: COMPLETED | OUTPATIENT
Start: 2020-12-19 | End: 2020-12-19

## 2020-12-19 RX ORDER — FUROSEMIDE 10 MG/ML
40 INJECTION INTRAMUSCULAR; INTRAVENOUS ONCE
Status: DISCONTINUED | OUTPATIENT
Start: 2020-12-20 | End: 2020-12-19

## 2020-12-19 RX ADMIN — PANTOPRAZOLE SODIUM 40 MG: 40 TABLET, DELAYED RELEASE ORAL at 17:16

## 2020-12-19 RX ADMIN — CETIRIZINE HYDROCHLORIDE 10 MG: 10 TABLET, FILM COATED ORAL at 08:22

## 2020-12-19 RX ADMIN — OXYCODONE 5 MG: 5 TABLET ORAL at 11:57

## 2020-12-19 RX ADMIN — POLYETHYLENE GLYCOL 3350 17 G: 17 POWDER, FOR SOLUTION ORAL at 08:22

## 2020-12-19 RX ADMIN — ARFORMOTEROL TARTRATE: 15 SOLUTION RESPIRATORY (INHALATION) at 07:53

## 2020-12-19 RX ADMIN — FERROUS SULFATE TAB 325 MG (65 MG ELEMENTAL FE) 325 MG: 325 (65 FE) TAB at 17:16

## 2020-12-19 RX ADMIN — ATORVASTATIN CALCIUM 10 MG: 10 TABLET, FILM COATED ORAL at 08:22

## 2020-12-19 RX ADMIN — LOSARTAN POTASSIUM 100 MG: 50 TABLET, FILM COATED ORAL at 20:24

## 2020-12-19 RX ADMIN — FERROUS SULFATE TAB 325 MG (65 MG ELEMENTAL FE) 325 MG: 325 (65 FE) TAB at 08:22

## 2020-12-19 RX ADMIN — FUROSEMIDE 40 MG: 10 INJECTION, SOLUTION INTRAMUSCULAR; INTRAVENOUS at 17:16

## 2020-12-19 RX ADMIN — OXYCODONE 5 MG: 5 TABLET ORAL at 20:24

## 2020-12-19 RX ADMIN — OYSTER SHELL CALCIUM WITH VITAMIN D 1 TABLET: 500; 200 TABLET, FILM COATED ORAL at 08:22

## 2020-12-19 RX ADMIN — MONTELUKAST SODIUM 10 MG: 10 TABLET, FILM COATED ORAL at 20:24

## 2020-12-19 RX ADMIN — FOLIC ACID 1 MG: 1 TABLET ORAL at 17:16

## 2020-12-19 RX ADMIN — DOCUSATE SODIUM 50MG AND SENNOSIDES 8.6MG 1 TABLET: 8.6; 5 TABLET, FILM COATED ORAL at 17:16

## 2020-12-19 RX ADMIN — Medication 400 MG: at 08:22

## 2020-12-19 RX ADMIN — CHOLECALCIFEROL TAB 25 MCG (1000 UNIT) 1 TABLET: 25 TAB at 08:22

## 2020-12-19 RX ADMIN — OYSTER SHELL CALCIUM WITH VITAMIN D 1 TABLET: 500; 200 TABLET, FILM COATED ORAL at 17:16

## 2020-12-19 RX ADMIN — DOCUSATE SODIUM 50MG AND SENNOSIDES 8.6MG 1 TABLET: 8.6; 5 TABLET, FILM COATED ORAL at 08:22

## 2020-12-19 RX ADMIN — ARFORMOTEROL TARTRATE: 15 SOLUTION RESPIRATORY (INHALATION) at 20:42

## 2020-12-19 RX ADMIN — Medication 10 ML: at 07:15

## 2020-12-19 RX ADMIN — PANTOPRAZOLE SODIUM 40 MG: 40 TABLET, DELAYED RELEASE ORAL at 07:16

## 2020-12-19 RX ADMIN — CHOLECALCIFEROL TAB 25 MCG (1000 UNIT) 1 TABLET: 25 TAB at 17:16

## 2020-12-19 NOTE — PROGRESS NOTES
Problem: Mobility Impaired (Adult and Pediatric)  Goal: *Acute Goals and Plan of Care (Insert Text)  Description: FUNCTIONAL STATUS PRIOR TO ADMISSION: Patient was modified independent using a single point cane for functional mobility. HOME SUPPORT PRIOR TO ADMISSION: The patient lived with spouse but did not require assist.    Physical Therapy Goals  Initiated 12/16/2020    1. Patient will move from supine to sit and sit to supine , scoot up and down, and roll side to side in bed with independence within 4 days. 2. Patient will perform sit to stand with independence within 4 days. 3. Patient will ambulate with modified independence for 200 feet with the least restrictive device within 4 days. 4. Patient will verbalize and demonstrate understanding of spinal precautions (No bending, lifting greater than 5 lbs, or twisting; log-roll technique; frequent repositioning as instructed) within 4 days. Note:   PHYSICAL THERAPY TREATMENT  Patient: Justyn Gore (89 y.o. female)  Date: 12/19/2020  Diagnosis: Spondylolisthesis of lumbar region [M43.16]  Spinal stenosis of lumbar region with neurogenic claudication [M48.062]  Lumbar stenosis with neurogenic claudication [M48.062] <principal problem not specified>  Procedure(s) (LRB):  L2-S1 LAMINECTOMY, L2-L5 FUSION,  INSTRUMENTATION, TLIF, BONE GRAFT (N/A) 4 Days Post-Op  Precautions: Back, Fall, Skin No bending, no lifting greater than 5 lbs, no twisting, log-roll technique, repositioning every 20-30 min except when sleeping, brace when OOB (if ordered)  Chart, physical therapy assessment, plan of care and goals were reviewed. ASSESSMENT  Patient continues with skilled PT services and is progressing towards goals. Patient was cleared on 12/18 and again today 12/19 for discharge to home. Recalls 100% spine precautions, don/doffs brace independently, and SBA for all mobility. Amb cut short this afternoon as family medicine physician arrived.  Patient is not being d/c'd today due to severe pitting edema BLEs such that her skin is weeping. Placed elevated up in bed and call bell in reach. Current Level of Function Impacting Discharge (mobility/balance): CG A overall for mobility    Other factors to consider for discharge: BLE pitting edema and weeping skin         PLAN :  Patient continues to benefit from skilled intervention to address the above impairments. Continue treatment per established plan of care. to address goals. Recommendation for discharge: (in order for the patient to meet his/her long term goals)  Physical therapy at least 2 days/week in the home     This discharge recommendation:  Has not yet been discussed the attending provider and/or case management    IF patient discharges home will need the following DME: rolling walker received       SUBJECTIVE:   Patient stated I don't know why my legs are doing that. I';ve been sitting up in the chair most of today.     OBJECTIVE DATA SUMMARY:   Critical Behavior:  Neurologic State: Alert, Appropriate for age, Eyes open spontaneously  Orientation Level: Oriented X4  Cognition: Appropriate decision making, Appropriate for age attention/concentration, Appropriate safety awareness, Follows commands  Safety/Judgement: Awareness of environment, Insight into deficits    Spinal diagnosis intervention:  The patient stated 3/3 back precautions when prompted. Reviewed all 3 back precautions, log roll technique, and sitting for 30 minutes at a time. The patient required 2 verbal cues to maintain back precautions during functional activity. Reviewed back brace application and wear schedule.  Brace donned with modified independence      Functional Mobility Training:    Bed Mobility:  Log Rolling: Contact guard assistance  Supine to Sit: (in chair on arrival)  Sit to Supine: Minimum assistance  Scooting: Stand-by assistance        Transfers:  Sit to Stand: Contact guard assistance  Stand to Sit: Contact guard assistance  Stand Pivot Transfers: Contact guard assistance     Bed to Chair: Contact guard assistance                    Balance:  Sitting: Intact  Standing: Intact; With support  Ambulation/Gait Training:  Distance (ft): 40 Feet (ft)  Assistive Device: Brace/Splint;Gait belt;Walker, rolling  Ambulation - Level of Assistance: Contact guard assistance        Gait Abnormalities: Antalgic        Base of Support: Narrowed     Speed/May: Pace decreased (<100 feet/min)  Step Length: Left shortened;Right shortened    Stairs:NT       Pain Rating:  3/10    Activity Tolerance:   Good and SpO2 stable on RA    After treatment patient left in no apparent distress:   Supine in bed, Heels elevated for pressure relief, Call bell within reach, and Side rails x 3    COMMUNICATION/COLLABORATION:   The patients plan of care was discussed with: Registered nurse.      Mitesh Ivory PT, DPT   Time Calculation: 30 mins

## 2020-12-19 NOTE — PROGRESS NOTES
ORTHOPAEDIC LUMBAR FUSION PROGRESS NOTE    NAME:     William Carreno   :       1945   MRN:       [id number]   DATE:      2020    POD:    4 Days Post-Op  S/P:    Procedure(s):  L2-S1 LAMINECTOMY, L2-L5 FUSION,  INSTRUMENTATION, TLIF, BONE GRAFT    SUBJECTIVE:    C/O back pain along surgical incision and peripheral edema  No leg pain or numbness  Denies nausea/vomiting, headache, palpitations, near-syncope, dizziness/lightheadedness, chest pain or shortness of breath  Pain controlled    1 unit PRBCs transfused yesterday, hgb 7.2    Recent Labs     20  0216   HGB 7.2*   HCT 24.0*      K 5.1      CO2 32   BUN 15   CREA 0.84   *     Patient Vitals for the past 12 hrs:   BP Temp Pulse Resp SpO2   20 1057 (!) 151/78 98.4 °F (36.9 °C) 79 16 100 %   20 0820 116/74 98.9 °F (37.2 °C) 84 16 95 %   20 0753 -- -- -- -- 95 %   20 0451 112/75 98.6 °F (37 °C) 85 16 95 %   20 0127 138/68 98.1 °F (36.7 °C) 82 20 95 %       EXAM:  Dressings clean, dry and intact   Positive strength/ROM bilat lower ext. Neuro intact to sensation  3+ peripheral edema in bilateral LEs. R lower leg is weeping a little bit.  No s/s of infx  Calves, soft & nontender  BL LEs NVID      PLAN:  Consult FP   Continue prn PO pain medications  PT/OT, OOB w/ assist  Tolerating diet  D/C hemovac drain      Miko Jang [place]  Orthopaedic Surgery  Physician Assistant to Dr. Lori Messina

## 2020-12-19 NOTE — CONSULTS
[address] [place] [address] with [facility] and [facility] [employer]  [address] [place] [address] [address] [place] [address]   Office [phone], Fax [phone]    Family Medicine Consult    Patient:  Rose Kim  MRN:  [id number]  YOB: 1945  Age:  70 y.o. Primary care provider:  Abiola Guzman MD    Date of admission:  12/15/2020    Date of consultation:  12/19/2020    Requesting physician:  Dr Giselle Cowan                   History of present illness  Rose Kim is a 70 y.o. female  s/p L2-S1 LAMINECTOMY, L2-L5 FUSION,  INSTRUMENTATION, TLIF, BONE GRAFT on 12/15. Pt had minimal blood loss (300ml) during the procedure but required 1 unit pRBC on 12/18. [facility] was consulted for medical management and bilateral lower extremity edema. The patient denies any fever, chills, chest pain, sob, n/v/d, cough, congestion, recent illness, palpitations, or dysuria. Patient reports that earlier today when walking she looked down and noticed that her calves were rubbing together and that she felt that her right leg was weeping and felt wet. She reports that she has seen her PCP for lower extremity edema in the past and was advised to use compression stockings. She has never had an echo but was previously worked up for  DVT with bilateral LE doppler which was reportedly negative. She reports that her PCP at the time prescribed ASA 81mg daily  that she has been taking since then. She denies history of MI, PE, stroke. Pain well controlled. Tolerating diet.         Past Medical History:   Diagnosis Date    Asthma     Chronic sinusitis     Fibromyalgia     GERD (gastroesophageal reflux disease)     Hypertension     Leg wound, left 11/1/20 to present    Morbid obesity with BMI of 40.0-44.9, adult ([facility] [address] 12/09/2020    Multiple allergies     Obstructive sleep apnea on CPAP     Osteopenia     RA (rheumatoid arthritis) ([facility] [address]       Past Surgical History:   Procedure Laterality Date    HX CARPAL TUNNEL RELEASE Right 2006         HX CARPAL TUNNEL RELEASE Left 2014    HX HERNIA REPAIR  2015    HX ROTATOR CUFF REPAIR Right          HX SEPTOPLASTY  2012    HX TONSILLECTOMY       Family History   Problem Relation Age of Onset    Diabetes Mother     Coronary Artery Disease Father     Anesth Problems Neg Hx     Clotting Disorder Neg Hx       Social History     Tobacco Use    Smoking status: Never Smoker    Smokeless tobacco: Never Used   Substance Use Topics    Alcohol use: No     Alcohol/week: 0.0 standard drinks       Prior to Admission Medications   Prescriptions Last Dose Informant Patient Reported? Taking? B.infantis-B.ani-B.long-B.bifi (Probiotic 4X) 10-15 mg TbEC 2020 at Unknown time  Yes Yes   Sig: Take  by mouth daily. Cholecalciferol, Vitamin D3, 1,000 unit cap 2020 at Unknown time  Yes Yes   Sig: Take 1,000 Units by mouth two (2) times a day. DULERA 200-5 mcg/actuation HFA inhaler 12/15/2020 at Unknown time  Yes Yes   Sig: Take 2 Puffs by inhalation two (2) times a day. OTHER 2020 at Unknown time  Yes Yes   Sig: Methycellulose 500mg PO daily   adalimumab (Humira,CF, Pen) 40 mg/0.4 mL injection pen 2020  No No   Si.4 mL by SubCUTAneous route every Wednesday. Indications: rheumatoid arthritis   albuterol (PROVENTIL HFA, VENTOLIN HFA, PROAIR HFA) 90 mcg/actuation inhaler Unknown at Unknown time  Yes No   Sig: Take 1 Puff by inhalation as needed for Wheezing. Used as rescue inhaler   aspirin delayed-release 81 mg tablet 2020  Yes No   Sig: Take  by mouth daily. atorvastatin (LIPITOR) 10 mg tablet 12/15/2020 at Unknown time  Yes Yes   Sig: Take  by mouth daily. calcium-cholecalciferol, D3, (CALTRATE 600+D) tablet 2020  Yes No   Sig: Take 1 Tab by mouth two (2) times a day. cetirizine (ZYRTEC) 10 mg tablet 12/15/2020 at Unknown time  Yes Yes   Sig: Take  by mouth daily.      esomeprazole (NEXIUM) 40 mg capsule 2020 at Unknown time  Yes Yes   Sig: Take  by mouth nightly. folic acid (FOLVITE) 1 mg tablet 2020  No No   Sig: Take 1 Tab by mouth daily. glucosamine-chondroit-vit c-mn (GLUCOSAMINE 1500 COMPLEX) 500-400 mg capsule 2020  Yes No   Sig: Take 2 Caps by mouth nightly. liraglutide (SAXENDA SC) 2020  Yes No   Si.06 mg by SubCUTAneous route nightly. Self inject   losartan (COZAAR) 100 mg tablet 2020 at Unknown time  Yes Yes   Sig: Take 100 mg by mouth daily. magnesium oxide (MAG-OX) 400 mg tablet 2020  Yes No   Sig: Take 400 mg by mouth daily. methotrexate 25 mg/mL chemo injection 2020  No No   Si mL by SubCUTAneous route every Wednesday. montelukast (SINGULAIR) 10 mg tablet 2020 at Unknown time  Yes Yes   Sig: Take 10 mg by mouth nightly. olopatadine (PATANASE) 0.6 % spry 12/15/2020 at Unknown time  Yes Yes   Sig: two (2) times a day.       Facility-Administered Medications: None       Current Facility-Administered Medications   Medication Dose Route Frequency Provider Last Rate Last Admin    0.9% sodium chloride infusion 250 mL  250 mL IntraVENous PRN Anai Vieyra NP        ferrous sulfate tablet 325 mg  1 Tab Oral BID WITH MEALS GRAY'Bradford, Temple Blood, NP   325 mg at 20 0895    arformoterol 15 mcg/budesonide 0.5 mg neb solution   Nebulization BID RT Idalia Jj MD   Given at 20 0753    cetirizine (ZYRTEC) tablet 10 mg  10 mg Oral DAILY Idalia Jj MD   10 mg at 20 9097    atorvastatin (LIPITOR) tablet 10 mg  10 mg Oral DAILY Idalia Jj MD   10 mg at 20 3281    losartan (COZAAR) tablet 100 mg  100 mg Oral DAILY Idalia Jj MD   100 mg at 20 3225    albuterol-ipratropium (DUO-NEB) 2.5 MG-0.5 MG/3 ML  3 mL Nebulization Q4H PRN Idalia Jj MD        calcium-vitamin D (OS-SUNNI +D3) 500 mg-200 unit per tablet 1 Tab  1 Tab Oral BID WITH MEALS Idalia Jj MD   1 Tab at 20 6342    montelukast (SINGULAIR) tablet 10 mg  10 mg Oral QHS Shilpi Wright MD   10 mg at 12/18/20 2159    magnesium oxide (MAG-OX) tablet 400 mg  400 mg Oral DAILY Shilpi Wright MD   400 mg at 12/19/20 5898    pantoprazole (PROTONIX) tablet 40 mg  40 mg Oral ACB&D Shilpi Wright MD   40 mg at 12/19/20 3066    cholecalciferol (VITAMIN D3) (1000 Units /25 mcg) tablet 1 Tab  1,000 Units Oral BID Shilpi Wright MD   1 Tab at 12/19/20 7897    benzocaine-menthoL (CEPACOL) lozenge 1 Lozenge  1 Lozenge Mucous Membrane PRN Shilpi Wright MD        sodium chloride (NS) flush 5-40 mL  5-40 mL IntraVENous Q8H Shilpi Wright MD   10 mL at 12/19/20 0715    sodium chloride (NS) flush 5-40 mL  5-40 mL IntraVENous PRN Shilpi Wright MD        naloxone [facility]) injection 0.4 mg  0.4 mg IntraVENous PRN Shilpi Wright MD        senna-docusate (PERICOLACE) 8.6-50 mg per tablet 1 Tab  1 Tab Oral BID Shilpi Wright MD   1 Tab at 12/19/20 0078    polyethylene glycol (MIRALAX) packet 17 g  17 g Oral DAILY Shilpi Wright MD   17 g at 12/19/20 0348    bisacodyL (DULCOLAX) suppository 10 mg  10 mg Rectal DAILY PRN Shilpi Wright MD        acetaminophen (TYLENOL) tablet 650 mg  650 mg Oral Q6H PRN Shilpi Wright MD        oxyCODONE IR (ROXICODONE) tablet 5 mg  5 mg Oral Q3H PRN Shilpi Wright MD   5 mg at 12/19/20 1157    oxyCODONE IR (ROXICODONE) tablet 10 mg  10 mg Oral Q3H PRN Shilpi Wright MD        diphenhydrAMINE (BENADRYL) injection 12.5 mg  12.5 mg IntraVENous Q6H PRN Shilpi Wright MD        cyclobenzaprine (FLEXERIL) tablet 10 mg  10 mg Oral TID PRN Shilpi Wright MD            Allergies   Allergen Reactions    Doxycycline Itching    Cottonseed Cough    Lamisil [Terbinafine] Rash    Malt Extract Cough                Immunization History   Administered Date(s) Administered    Influenza Vaccine 10/25/2015, 10/31/2016    Zoster Vaccine, Live 11/25/2013         Review of systems  A comprehensive review of systems was negative except for that written in the History of Present Illness. The remainder of the review of systems was reviewed and is non-contributory. Physical Examination   Visit Vitals  BP (!) 151/78 (BP 1 Location: Right arm, BP Patient Position: At rest)   Pulse 79   Temp 98.4 °F (36.9 °C)   Resp 16   Ht 5' 1\" (1.549 m)   Wt 212 lb 8.4 oz (96.4 kg)   LMP 11/25/2002   SpO2 100%   BMI 40.16 kg/m²       Intake and Output:    No intake/output data recorded. 12/17 1901 - 12/19 0700  In: 293.8   Out: 190 [Drains:190]    Visit Vitals  BP (!) 151/78 (BP 1 Location: Right arm, BP Patient Position: At rest)   Pulse 79   Temp 98.4 °F (36.9 °C)   Resp 16   Ht 5' 1\" (1.549 m)   Wt 212 lb 8.4 oz (96.4 kg)   LMP 11/25/2002   SpO2 100%   BMI 40.16 kg/m²     General:  Alert, cooperative, obese female in no distress, appears younger than stated age. Head:  Normocephalic, without obvious abnormality, atraumatic. Eyes:  Conjunctivae/corneas clear. Throat: Lips, mucosa, and tongue normal.    Neck: Supple, symmetrical, trachea midline, no adenopathy, thyroid: no enlargement/tenderness/nodules, no carotid bruit and no JVD. Back:   Symmetric. Bandage on back C/D/I. No drain in place   Lungs:   Bilaterally clear to auscultation bilaterally. Chest wall:  No tenderness or deformity. Heart:  Regular rate and rhythm, S1, S2 normal, grade 1 systolic murmur. No click, rub or gallop. Abdomen:   Soft, non-tender. Bowel sounds normal. No masses,  No organomegaly. Extremities: 4+ pitting bilateral lower extremity edema present with wound on anterior left calf with no erythema. Pulses: 2+ and symmetric all extremities. Skin: As above. Otherwise WNL. Neurologic: CNII-XII grossly intact. LE strength and sensation exam limited by ROM and postop pain. Upper extremity strength and sensation wnl.        Data Review:   Recent Results (from the past 24 hour(s))   TYPE & SCREEN    Collection Time: 12/19/20  2:16 AM   Result Value Ref Range    Crossmatch Expiration 12/22/2020,2359     ABO/Rh(D) O NEGATIVE     Antibody screen NEG    METABOLIC PANEL, BASIC    Collection Time: 12/19/20  2:16 AM   Result Value Ref Range    Sodium 139 136 - 145 mmol/L    Potassium 5.1 3.5 - 5.1 mmol/L    Chloride 105 97 - 108 mmol/L    CO2 32 21 - 32 mmol/L    Anion gap 2 (L) 5 - 15 mmol/L    Glucose 155 (H) 65 - 100 mg/dL    BUN 15 6 - 20 MG/DL    Creatinine 0.84 0.55 - 1.02 MG/DL    BUN/Creatinine ratio 18 12 - 20      GFR est AA >60 >60 ml/min/1.73m2    GFR est non-AA >60 >60 ml/min/1.73m2    Calcium 8.8 8.5 - 10.1 MG/DL   CBC W/O DIFF    Collection Time: 12/19/20  2:16 AM   Result Value Ref Range    WBC 10.4 3.6 - 11.0 K/uL    RBC 2.79 (L) 3.80 - 5.20 M/uL    HGB 7.2 (L) 11.5 - 16.0 g/dL    HCT 24.0 (L) 35.0 - 47.0 %    MCV 86.0 80.0 - 99.0 FL    MCH 25.8 (L) 26.0 - 34.0 PG    MCHC 30.0 30.0 - 36.5 g/dL    RDW 19.3 (H) 11.5 - 14.5 %    PLATELET 820 421 - 865 K/uL    MPV 10.2 8.9 - 12.9 FL    NRBC 0.4 (H) 0  WBC    ABSOLUTE NRBC 0.04 (H) 0.00 - 0.01 K/uL         24 Hour Results:  Recent Results (from the past 24 hour(s))   TYPE & SCREEN    Collection Time: 12/19/20  2:16 AM   Result Value Ref Range    Crossmatch Expiration 12/22/2020,2359     ABO/Rh(D) Kelle Passer NEGATIVE     Antibody screen NEG    METABOLIC PANEL, BASIC    Collection Time: 12/19/20  2:16 AM   Result Value Ref Range    Sodium 139 136 - 145 mmol/L    Potassium 5.1 3.5 - 5.1 mmol/L    Chloride 105 97 - 108 mmol/L    CO2 32 21 - 32 mmol/L    Anion gap 2 (L) 5 - 15 mmol/L    Glucose 155 (H) 65 - 100 mg/dL    BUN 15 6 - 20 MG/DL    Creatinine 0.84 0.55 - 1.02 MG/DL    BUN/Creatinine ratio 18 12 - 20      GFR est AA >60 >60 ml/min/1.73m2    GFR est non-AA >60 >60 ml/min/1.73m2    Calcium 8.8 8.5 - 10.1 MG/DL   CBC W/O DIFF    Collection Time: 12/19/20  2:16 AM   Result Value Ref Range    WBC 10.4 3.6 - 11.0 K/uL    RBC 2.79 (L) 3.80 - 5.20 M/uL    HGB 7.2 (L) 11.5 - 16.0 g/dL    HCT 24.0 (L) 35.0 - 47.0 %    MCV 86.0 80.0 - 99.0 FL MCH 25.8 (L) 26.0 - 34.0 PG    MCHC 30.0 30.0 - 36.5 g/dL    RDW 19.3 (H) 11.5 - 14.5 %    PLATELET 740 449 - 007 K/uL    MPV 10.2 8.9 - 12.9 FL    NRBC 0.4 (H) 0  WBC    ABSOLUTE NRBC 0.04 (H) 0.00 - 0.01 K/uL     Recent Labs     12/19/20  0216 12/18/20  0927 12/18/20  0241   WBC 10.4 9.9 9.5   HGB 7.2* 6.8* 6.3*  6.4*   HCT 24.0* 23.3* 21.7*    240 261     Recent Labs     12/19/20  0216 12/18/20  0241 12/17/20  0233    136 137   K 5.1 4.2 4.2    109* 109*   CO2 32 22 26   * 140* 173*   BUN 15 10 14   CREA 0.84 0.76 1.00   CA 8.8 8.2* 8.9         Impression/Recomendations   Bharath Lebron is a 70 y.o. female s/p L2-S1 LAMINECTOMY, L2-L5 FUSION,  INSTRUMENTATION, TLIF, BONE GRAFT on 12/15. The [address] [place] [address] team was consulted for medical management. S/p L2-S1 LAMINECTOMY, L2-L5 FUSION,  INSTRUMENTATION, TLIF, BONE GRAFT  - Management per Ortho    Bilateral lower extremity edema: Patient occasionally has LE edema at home, though never this severe. Ddx includes heart failure exacerbation, pulm HTN, bilateral DVT and oncotic due to liver failure. - Lasix 40mg IV now and tomorrow am  - LFTs to check albumin   - ECHO  - LE duplex    Anemia: Longstanding issue. Completely asymptomatic. HGB 7.2 this am Told by Dr Camilla Hilton, rheumatologist, that it was likely 2/2 methotrexate injections patient has been taking. She is S/p 1 unit pRBC yesterday. - CBC daily per ortho  - Continue iron BID per ortho  - Continue home folate daily  - Consider working up if patient develops symptoms    RA: Follows with Dr Camilla Hilton. Receives methotrexate and adalimumab injections once weekly. - Hold methotrexate and adalimumab    Obesity: Patients BMI is 40. 16. She is reportedly on liraglutide to aid in weight loss  - Hold liraglutide    Allergies: Patient on zyrtec daily for allergies  - Continue zyrtec    Asthma: PTA albuterol PRN, singulair and dulera.  Stable  - Aformeterol/budesonide neb BID  - Continue home singulair    GERD: PTA nexium. Stable  - Hold home nexium, consider switching to non PPI on discharge due to increased risk of fractures with longstanding PPI use  - Protonix 40    HTN: Stable. - Continue home cozaar    HLD: Stable  - Continue home lipitor 10    PAUL: On CPAP at home. Asked patient to have her spouse bring her CPAP.   [facility] up her [facility]    DM: Stable. Last HA1c 5.9 on 12/9. Hold home liraglutide (reportedly prescribed for weight loss)  - Daily BMP    Pain Management: Per Ortho    DVT Prophylaxis: Per Ortho    Disposition: Per Ortho     Thank you very much for allowing us to participate in the care of this pleasant patient. The family medicine service will continue to follow the patient's medical progress along with you. Please do not hesitate to page us at [phone] with any questions or concerns.       Signed by:     Yamile Hester MD   Family Medicine Resident    December 19, 2020 at 1:36 PM

## 2020-12-19 NOTE — PROGRESS NOTES
1910: Bedside shift change report given to Cleopatra Crigler, RN  (oncoming nurse) by Deepa Narvaez RN  (offgoing nurse). Report included the following information SBAR.

## 2020-12-19 NOTE — PROGRESS NOTES
[facility] RESIDENCY PROGRAM  PROGRESS NOTE     12/20/2020  PCP: Sammie Sotelo MD     Assessment/Plan:     Jovan Phelan is a 70 y.o. female with PMHx of lumbar spinal stenosis, fibromyalgia, obesity, DM type II, RA,  asthma, HTN, HLD, GERD, and PAUL who is s/p L2-S1 laminectomy and L2-L5 fusion on 12/15. The [address] [place] [address] team was consulted for medical management.     Overnight Events: No acute events overnight. S/p L2-S1 laminectomy and L2-L5 fusion: On 12/15 with Dr. Octavio Lino. - Management per Ortho     Bilateral lower extremity edema: Patient occasionally has LE edema at home, though never this severe. DDx includes heart failure exacerbation, pulm HTN, DVT, hypoalbuminemia, third spacing. LFTs wnl.   - Lasix 40mg IV daily   - Echo pending   - LE doppler US  - Add compression stockings      Acute on Chronic Normocytic Anemia: Asymptomatic. BL Hgb ~10.0. Told by Dr Claudean Spangle, rheumatologist, that it was likely 2/2 methotrexate. Hgb as low as 6.3 following surgery. S/p 1 unit PRBC on 12/18. Hgb stable at 7.7.   - CBC daily   - Continue iron BID per ortho  - Continue home folate daily  - Consider further work up if patient develops symptoms     RA: Follows with Dr Claudean Spangle. Receives methotrexate 25 mg and adalimumab 40 mg injections every Wednesday. - Hold methotrexate and adalimumab while inpatient      Obesity: BMI is 40. 16. She is reportedly on liraglutide to aid in weight loss  - Hold liraglutide     Allergies: Patient on zyrtec daily for allergies  - Continue zyrtec     Asthma: On home albuterol PRN, singulair and dulera. Stable  - Aformeterol/budesonide neb BID  - Continue home singulair     GERD: Stable. On Nexium at home. - Hold home nexium, consider switching to non PPI on discharge due to increased risk of fractures with longstanding PPI use  - Protonix 40 mg BID while inpatient      HTN: Stable. - Continue home cozaar     HLD: Stable  - Continue home lipitor 10 mg daily      PAUL: On CPAP at home. - Continue CPAP qhs     DM type II: Stable. Last HA1c 5.9 on . Hold home liraglutide (reportedly prescribed for weight loss)  - Daily BMP     Pain Management: Per Ortho     DVT Prophylaxis: Per Ortho     Disposition: Per Ortho  Subjective:   Pt was seen and examined at bedside. Afebrile and hemodynamically stable. She reports that her right leg swelling has improved slightly but it did have some weeping overnight. She denies any CP, SOB, nausea, vomiting, abdominal pain, leg pain. Her back pain is mild at 3/10. Objective:   Physical examination  Patient Vitals for the past 24 hrs:   Temp Pulse Resp BP SpO2   20 0342 98.3 °F (36.8 °C) 78 17 105/78 97 %   20 2241 99.1 °F (37.3 °C) 87 16 114/71 96 %   20 1947 99 °F (37.2 °C) 87 15 125/68 99 %   20 1534 98.6 °F (37 °C) 69 15 133/78 97 %   20 1057 98.4 °F (36.9 °C) 79 16 (!) 151/78 100 %   20 0820 98.9 °F (37.2 °C) 84 16 116/74 95 %   20 0753 -- -- -- -- 95 %      Temp (24hrs), Av.7 °F (37.1 °C), Min:98.3 °F (36.8 °C), Max:99.1 °F (37.3 °C)     O2 Flow Rate (L/min): 2 l/min   O2 Device: Room air    Date 20 - 20 - 20 0659   Shift [id number] 24 Hour Total 4755-1336 0807-9172 24 Hour Total   INTAKE   I.V.(mL/kg/hr) 0(0)  0        Volume (0.9% sodium chloride infusion 250 mL) 0  0      Shift Total(mL/kg) 0(0)  0(0)      OUTPUT   Drains 40  40        Output (ml) ([REMOVED] Hemovac Right Back) 40  40      Shift Total(mL/kg) 40(0.4)  40(0.4)      NET -40  -40      Weight (kg) 96.4 96.4 96.4 96.4 96.4 96.4     General:  Alert, cooperative, obese female in no distress, resting comfortably in bed    Head:  Normocephalic, atraumatic. Eyes:  Conjunctivae/corneas clear. Throat: Moist mucous membranes   Neck: Supple, symmetrical, trachea midline, no JVD. Back:   Bandage on back C/D/I. Lungs:   Clear to auscultation bilaterally. No wheezing, rales, rhonchi.     Heart: Regular rate and rhythm, no murmur   Abdomen:   Soft, non-tender. Bowel sounds normal.    Extremities: 3+ pitting edema in RLE, 2+ pitting edema in LLE. Full ROM. Pulses: 2+ and symmetric all extremities. Skin:  Wound noted on anterior left calf with no erythema. Neurologic: No focal deficits.         Data Review:     CBC:  Recent Labs     12/20/20  0020 12/19/20  0216 12/18/20  0927   WBC 10.7 10.4 9.9   HGB 7.7* 7.2* 6.8*   HCT 25.5* 24.0* 23.3*    160 236     Metabolic Panel:  Recent Labs     12/20/20  0020 12/19/20  0216 12/18/20  0241   * 139 136   K 3.7 5.1 4.2   CL 99 105 109*   CO2 31 32 22   BUN 18 15 10   CREA 0.87 0.84 0.76   * 155* 140*   CA 8.9 8.8 8.2*   ALB  --  2.5*  --    TBILI  --  0.6  --    ALT  --  22  --      Micro:  Lab Results   Component Value Date/Time    Culture result: MRSA NOT PRESENT 12/09/2020 12:22 PM    Culture result:  12/09/2020 12:22 PM         Screening of patient nares for MRSA is for surveillance purposes and, if positive, to facilitate isolation considerations in high risk settings. It is not intended for automatic decolonization interventions per se as regimens are not sufficiently effective to warrant routine use. Imaging:  Nc Xr Technologist Service    Result Date: 12/15/2020  INTERPRETATION PROVIDED FOR COMPLIANCE ONLY AT NO CHARGE FINDINGS: Intraoperative spot radiographs of the lumbar spine were obtained during L2-L5 posterior fusion. No operative complication is evident. IMPRESSION: Lumbar fusion in progress.     Medications reviewed  Current Facility-Administered Medications   Medication Dose Route Frequency    glucose chewable tablet 16 g  4 Tab Oral PRN    dextrose (D50W) injection syrg 12.5-25 g  12.5-25 g IntraVENous PRN    glucagon (GLUCAGEN) injection 1 mg  1 mg IntraMUSCular PRN    furosemide (LASIX) injection 40 mg  40 mg IntraVENous DAILY    folic acid (FOLVITE) tablet 1 mg  1 mg Oral DAILY    0.9% sodium chloride infusion 250 mL  250 mL IntraVENous PRN    ferrous sulfate tablet 325 mg  1 Tab Oral BID WITH MEALS    arformoterol 15 mcg/budesonide 0.5 mg neb solution   Nebulization BID RT    cetirizine (ZYRTEC) tablet 10 mg  10 mg Oral DAILY    atorvastatin (LIPITOR) tablet 10 mg  10 mg Oral DAILY    losartan (COZAAR) tablet 100 mg  100 mg Oral DAILY    albuterol-ipratropium (DUO-NEB) 2.5 MG-0.5 MG/3 ML  3 mL Nebulization Q4H PRN    calcium-vitamin D (OS-SUNNI +D3) 500 mg-200 unit per tablet 1 Tab  1 Tab Oral BID WITH MEALS    montelukast (SINGULAIR) tablet 10 mg  10 mg Oral QHS    magnesium oxide (MAG-OX) tablet 400 mg  400 mg Oral DAILY    pantoprazole (PROTONIX) tablet 40 mg  40 mg Oral ACB&D    cholecalciferol (VITAMIN D3) (1000 Units /25 mcg) tablet 1 Tab  1,000 Units Oral BID    benzocaine-menthoL (CEPACOL) lozenge 1 Lozenge  1 Lozenge Mucous Membrane PRN    sodium chloride (NS) flush 5-40 mL  5-40 mL IntraVENous Q8H    sodium chloride (NS) flush 5-40 mL  5-40 mL IntraVENous PRN    naloxone (NARCAN) injection 0.4 mg  0.4 mg IntraVENous PRN    senna-docusate (PERICOLACE) 8.6-50 mg per tablet 1 Tab  1 Tab Oral BID    polyethylene glycol (MIRALAX) packet 17 g  17 g Oral DAILY    bisacodyL (DULCOLAX) suppository 10 mg  10 mg Rectal DAILY PRN    acetaminophen (TYLENOL) tablet 650 mg  650 mg Oral Q6H PRN    oxyCODONE IR (ROXICODONE) tablet 5 mg  5 mg Oral Q3H PRN    oxyCODONE IR (ROXICODONE) tablet 10 mg  10 mg Oral Q3H PRN    diphenhydrAMINE (BENADRYL) injection 12.5 mg  12.5 mg IntraVENous Q6H PRN    cyclobenzaprine (FLEXERIL) tablet 10 mg  10 mg Oral TID PRN         Signed:   Kriss Marcelino MD   Resident, Family Medicine      Attending note: Attending note to follow. ..

## 2020-12-19 NOTE — PROGRESS NOTES
Bedside shift change report given to Prince Townsend RN (oncoming nurse) by Edson Reed RN (offgoing nurse). Report included the following information SBAR, Kardex and MAR.

## 2020-12-19 NOTE — PROGRESS NOTES
Bedside and Verbal shift change report given to Sherren Sneddon (oncoming nurse) by Sung Oates RN (offgoing nurse). Report included the following information SBAR, Kardex, ED Summary, Procedure Summary, Intake/Output, Recent Results, Med Rec Status and Cardiac Rhythm NSR. Bedside and Verbal shift change report given to Mara Treadwell RN (oncoming nurse) by Sherren Sneddon (offgoing nurse). Report included the following information SBAR, Kardex, ED Summary, Procedure Summary, Intake/Output, Recent Results and Med Rec Status.

## 2020-12-20 ENCOUNTER — APPOINTMENT (OUTPATIENT)
Dept: NON INVASIVE DIAGNOSTICS | Age: 75
DRG: 454 | End: 2020-12-20
Attending: STUDENT IN AN ORGANIZED HEALTH CARE EDUCATION/TRAINING PROGRAM
Payer: COMMERCIAL

## 2020-12-20 ENCOUNTER — APPOINTMENT (OUTPATIENT)
Dept: VASCULAR SURGERY | Age: 75
DRG: 454 | End: 2020-12-20
Attending: STUDENT IN AN ORGANIZED HEALTH CARE EDUCATION/TRAINING PROGRAM
Payer: COMMERCIAL

## 2020-12-20 LAB
ANION GAP SERPL CALC-SCNC: 4 MMOL/L (ref 5–15)
BUN SERPL-MCNC: 18 MG/DL (ref 6–20)
BUN/CREAT SERPL: 21 (ref 12–20)
CALCIUM SERPL-MCNC: 8.9 MG/DL (ref 8.5–10.1)
CHLORIDE SERPL-SCNC: 99 MMOL/L (ref 97–108)
CO2 SERPL-SCNC: 31 MMOL/L (ref 21–32)
CREAT SERPL-MCNC: 0.87 MG/DL (ref 0.55–1.02)
ECHO AO ASC DIAM: 2.92 CM
ECHO AO ROOT DIAM: 3.26 CM
ECHO AR MAX VEL PISA: 401.5 CENTIMETER/SECOND
ECHO AV AREA PEAK VELOCITY: 2.34 CM2
ECHO AV AREA VTI: 2.16 CM2
ECHO AV AREA/BSA PEAK VELOCITY: 1.2 CM2/M2
ECHO AV AREA/BSA VTI: 1.1 CM2/M2
ECHO AV MEAN GRADIENT: 9.83 MMHG
ECHO AV PEAK GRADIENT: 18.19 MMHG
ECHO AV PEAK VELOCITY: 213.26 CM/S
ECHO AV REGURGITANT PHT: 684.21 MS
ECHO AV VTI: 35.54 CM
ECHO IVC PROX: 2.01 CM
ECHO LA AREA 4C: 19.84 CM2
ECHO LA MAJOR AXIS: 3.98 CM
ECHO LA MINOR AXIS: 2.04 CM
ECHO LA VOL 2C: 45.85 ML (ref 22–52)
ECHO LA VOL 4C: 56.38 ML (ref 22–52)
ECHO LA VOL BP: 52.97 ML (ref 22–52)
ECHO LA VOL/BSA BIPLANE: 27.14 ML/M2 (ref 16–28)
ECHO LA VOLUME INDEX A2C: 23.49 ML/M2 (ref 16–28)
ECHO LA VOLUME INDEX A4C: 28.88 ML/M2 (ref 16–28)
ECHO LV E' LATERAL VELOCITY: 9.82 CENTIMETER/SECOND
ECHO LV E' SEPTAL VELOCITY: 9.46 CENTIMETER/SECOND
ECHO LV INTERNAL DIMENSION DIASTOLIC: 4.13 CM (ref 3.9–5.3)
ECHO LV INTERNAL DIMENSION SYSTOLIC: 2.38 CM
ECHO LV IVSD: 1.45 CM (ref 0.6–0.9)
ECHO LV MASS 2D: 238.5 G (ref 67–162)
ECHO LV MASS INDEX 2D: 122.2 G/M2 (ref 43–95)
ECHO LV POSTERIOR WALL DIASTOLIC: 1.51 CM (ref 0.6–0.9)
ECHO LVOT DIAM: 1.98 CM
ECHO LVOT PEAK GRADIENT: 10.6 MMHG
ECHO LVOT PEAK VELOCITY: 162.76 CM/S
ECHO LVOT SV: 76.9 ML
ECHO LVOT VTI: 25.1 CM
ECHO MV A VELOCITY: 112.32 CENTIMETER/SECOND
ECHO MV AREA PHT: 3.26 CM2
ECHO MV E DECELERATION TIME (DT): 232.83 MS
ECHO MV E VELOCITY: 82.96 CENTIMETER/SECOND
ECHO MV PRESSURE HALF TIME (PHT): 67.52 MS
ECHO PV MAX VELOCITY: 165.61 CM/S
ECHO PV PEAK INSTANTANEOUS GRADIENT SYSTOLIC: 11.19 MMHG
ECHO RV INTERNAL DIMENSION: 3.27 CM
ECHO RV TAPSE: 2.01 CM (ref 1.5–2)
ECHO TV REGURGITANT MAX VELOCITY: 194.72 CM/S
ECHO TV REGURGITANT PEAK GRADIENT: 15.17 MMHG
ERYTHROCYTE [DISTWIDTH] IN BLOOD BY AUTOMATED COUNT: 19.2 % (ref 11.5–14.5)
GLUCOSE SERPL-MCNC: 167 MG/DL (ref 65–100)
HCT VFR BLD AUTO: 25.5 % (ref 35–47)
HGB BLD-MCNC: 7.7 G/DL (ref 11.5–16)
LA VOL DISK BP: 50.83 ML (ref 22–52)
LVOT MG: 4.47 MMHG
MCH RBC QN AUTO: 25.5 PG (ref 26–34)
MCHC RBC AUTO-ENTMCNC: 30.2 G/DL (ref 30–36.5)
MCV RBC AUTO: 84.4 FL (ref 80–99)
NRBC # BLD: 0.03 K/UL (ref 0–0.01)
NRBC BLD-RTO: 0.3 PER 100 WBC
PLATELET # BLD AUTO: 266 K/UL (ref 150–400)
PMV BLD AUTO: 10.4 FL (ref 8.9–12.9)
POTASSIUM SERPL-SCNC: 3.7 MMOL/L (ref 3.5–5.1)
RBC # BLD AUTO: 3.02 M/UL (ref 3.8–5.2)
SODIUM SERPL-SCNC: 134 MMOL/L (ref 136–145)
WBC # BLD AUTO: 10.7 K/UL (ref 3.6–11)

## 2020-12-20 PROCEDURE — 65270000029 HC RM PRIVATE

## 2020-12-20 PROCEDURE — 74011250637 HC RX REV CODE- 250/637: Performed by: ORTHOPAEDIC SURGERY

## 2020-12-20 PROCEDURE — 74011250637 HC RX REV CODE- 250/637: Performed by: STUDENT IN AN ORGANIZED HEALTH CARE EDUCATION/TRAINING PROGRAM

## 2020-12-20 PROCEDURE — 74011250636 HC RX REV CODE- 250/636: Performed by: STUDENT IN AN ORGANIZED HEALTH CARE EDUCATION/TRAINING PROGRAM

## 2020-12-20 PROCEDURE — 93306 TTE W/DOPPLER COMPLETE: CPT | Performed by: SPECIALIST

## 2020-12-20 PROCEDURE — 80048 BASIC METABOLIC PNL TOTAL CA: CPT

## 2020-12-20 PROCEDURE — 94760 N-INVAS EAR/PLS OXIMETRY 1: CPT

## 2020-12-20 PROCEDURE — 94640 AIRWAY INHALATION TREATMENT: CPT

## 2020-12-20 PROCEDURE — 77030012890

## 2020-12-20 PROCEDURE — 93970 EXTREMITY STUDY: CPT

## 2020-12-20 PROCEDURE — 99222 1ST HOSP IP/OBS MODERATE 55: CPT | Performed by: FAMILY MEDICINE

## 2020-12-20 PROCEDURE — 36415 COLL VENOUS BLD VENIPUNCTURE: CPT

## 2020-12-20 PROCEDURE — 74011250637 HC RX REV CODE- 250/637: Performed by: NURSE PRACTITIONER

## 2020-12-20 PROCEDURE — 85027 COMPLETE CBC AUTOMATED: CPT

## 2020-12-20 PROCEDURE — 94664 DEMO&/EVAL PT USE INHALER: CPT

## 2020-12-20 PROCEDURE — 93306 TTE W/DOPPLER COMPLETE: CPT

## 2020-12-20 PROCEDURE — 74011000250 HC RX REV CODE- 250: Performed by: ORTHOPAEDIC SURGERY

## 2020-12-20 RX ADMIN — ARFORMOTEROL TARTRATE: 15 SOLUTION RESPIRATORY (INHALATION) at 20:11

## 2020-12-20 RX ADMIN — PANTOPRAZOLE SODIUM 40 MG: 40 TABLET, DELAYED RELEASE ORAL at 06:11

## 2020-12-20 RX ADMIN — CHOLECALCIFEROL TAB 25 MCG (1000 UNIT) 1 TABLET: 25 TAB at 17:53

## 2020-12-20 RX ADMIN — OXYCODONE 5 MG: 5 TABLET ORAL at 00:21

## 2020-12-20 RX ADMIN — POLYETHYLENE GLYCOL 3350 17 G: 17 POWDER, FOR SOLUTION ORAL at 08:41

## 2020-12-20 RX ADMIN — CETIRIZINE HYDROCHLORIDE 10 MG: 10 TABLET, FILM COATED ORAL at 08:42

## 2020-12-20 RX ADMIN — FUROSEMIDE 40 MG: 10 INJECTION, SOLUTION INTRAMUSCULAR; INTRAVENOUS at 08:42

## 2020-12-20 RX ADMIN — LOSARTAN POTASSIUM 100 MG: 50 TABLET, FILM COATED ORAL at 20:31

## 2020-12-20 RX ADMIN — PANTOPRAZOLE SODIUM 40 MG: 40 TABLET, DELAYED RELEASE ORAL at 17:53

## 2020-12-20 RX ADMIN — Medication 400 MG: at 08:41

## 2020-12-20 RX ADMIN — OYSTER SHELL CALCIUM WITH VITAMIN D 1 TABLET: 500; 200 TABLET, FILM COATED ORAL at 08:41

## 2020-12-20 RX ADMIN — FOLIC ACID 1 MG: 1 TABLET ORAL at 08:41

## 2020-12-20 RX ADMIN — CHOLECALCIFEROL TAB 25 MCG (1000 UNIT) 1 TABLET: 25 TAB at 08:42

## 2020-12-20 RX ADMIN — OXYCODONE 5 MG: 5 TABLET ORAL at 06:11

## 2020-12-20 RX ADMIN — FERROUS SULFATE TAB 325 MG (65 MG ELEMENTAL FE) 325 MG: 325 (65 FE) TAB at 08:41

## 2020-12-20 RX ADMIN — ATORVASTATIN CALCIUM 10 MG: 10 TABLET, FILM COATED ORAL at 08:42

## 2020-12-20 RX ADMIN — FERROUS SULFATE TAB 325 MG (65 MG ELEMENTAL FE) 325 MG: 325 (65 FE) TAB at 17:53

## 2020-12-20 RX ADMIN — DOCUSATE SODIUM 50MG AND SENNOSIDES 8.6MG 1 TABLET: 8.6; 5 TABLET, FILM COATED ORAL at 08:41

## 2020-12-20 RX ADMIN — ARFORMOTEROL TARTRATE: 15 SOLUTION RESPIRATORY (INHALATION) at 08:17

## 2020-12-20 RX ADMIN — DOCUSATE SODIUM 50MG AND SENNOSIDES 8.6MG 1 TABLET: 8.6; 5 TABLET, FILM COATED ORAL at 17:53

## 2020-12-20 RX ADMIN — OXYCODONE 5 MG: 5 TABLET ORAL at 20:31

## 2020-12-20 RX ADMIN — OXYCODONE HYDROCHLORIDE 10 MG: 5 TABLET ORAL at 12:55

## 2020-12-20 RX ADMIN — OYSTER SHELL CALCIUM WITH VITAMIN D 1 TABLET: 500; 200 TABLET, FILM COATED ORAL at 17:53

## 2020-12-20 RX ADMIN — MONTELUKAST SODIUM 10 MG: 10 TABLET, FILM COATED ORAL at 20:31

## 2020-12-20 NOTE — PROGRESS NOTES
Physical Therapy: pt scheduled for LE doppler to r/o DVT and Echo. Will defer PT this morning until assessment completed, and activity status updated/cleared to participate.    Thank you,  Kesha Zendejas, PT MS

## 2020-12-20 NOTE — PROGRESS NOTES
Subjective:    Tito Russell is a 70 y.o. female who is POD 5 L2-S1 LAMINECTOMY, L2-L5 FUSION,  INSTRUMENTATION, TLIF, BONE GRAFT    Major Events:. FP consulted yesterday for PO anemia. Pt continues to be asymptomatic and denies HA, dizziness, chest pain or SOB. Pain along surgical incisions improving    Objective:    Vital signs in last 24 hours:    Temp:  [97.6 °F (36.4 °C)-99.2 °F (37.3 °C)]   Pulse (Heart Rate):  []   BP: (105-151)/(60-79)   Resp Rate:  [15-20]   O2 Sat (%):  [90 %-100 %]   Weight: --    Temp (24hrs), Av.7 °F (37.1 °C), Min:98.3 °F (36.8 °C), Max:99.1 °F (37.3 °C)      Labs:    Lab Results   Component Value Date/Time    WBC 10.7 2020 12:20 AM    HGB 7.7 (L) 2020 12:20 AM    HCT 25.5 (L) 2020 12:20 AM    PLATELET 872  12:20 AM       Lab Results   Component Value Date/Time    Sodium 134 (L) 2020 12:20 AM    Potassium 3.7 2020 12:20 AM    Chloride 99 2020 12:20 AM    CO2 31 2020 12:20 AM    BUN 18 2020 12:20 AM       Physical Exam:    General: alert, cooperative, in NAD  Nonlabored resp    JOHNATHAN Lower extremity/ spine:     Global edema in BLE with small open wound on LLE with no drainage, erythema or fluctuance    Dressing:c/d/i    Motor: + ankle df/pf    Sensory: SILT    Vascular: Brisk capillary refill in toes    Assessment/Plan:  Consult FP:    Recc LE doppler to check for DVT as well as ECHO.  Both to be done  today  Continue prn PO pain medications  PT/OT, OOB w/ assist  Tolerating diet  DC to home when clear by ANIBAL Parekh PA-C

## 2020-12-20 NOTE — PROGRESS NOTES
Bedside shift change report given to Danielle Mcguire RN (oncoming nurse) by Jaclyn Gordon RN (offgoing nurse). Report included the following information SBAR, Kardex and MAR.

## 2020-12-20 NOTE — PROGRESS NOTES
Bedside shift change report given to Enriqueta Charles RN (oncoming nurse) by Israel Austin RN (offgoing nurse). Report included the following information SBAR, Kardex and MAR.

## 2020-12-21 VITALS
SYSTOLIC BLOOD PRESSURE: 136 MMHG | BODY MASS INDEX: 40.78 KG/M2 | RESPIRATION RATE: 16 BRPM | HEIGHT: 61 IN | TEMPERATURE: 98.3 F | HEART RATE: 89 BPM | OXYGEN SATURATION: 98 % | DIASTOLIC BLOOD PRESSURE: 61 MMHG | WEIGHT: 216 LBS

## 2020-12-21 LAB
ANION GAP SERPL CALC-SCNC: 5 MMOL/L (ref 5–15)
BUN SERPL-MCNC: 18 MG/DL (ref 6–20)
BUN/CREAT SERPL: 20 (ref 12–20)
CALCIUM SERPL-MCNC: 8.6 MG/DL (ref 8.5–10.1)
CHLORIDE SERPL-SCNC: 98 MMOL/L (ref 97–108)
CO2 SERPL-SCNC: 31 MMOL/L (ref 21–32)
CREAT SERPL-MCNC: 0.91 MG/DL (ref 0.55–1.02)
ERYTHROCYTE [DISTWIDTH] IN BLOOD BY AUTOMATED COUNT: 19.5 % (ref 11.5–14.5)
GLUCOSE SERPL-MCNC: 142 MG/DL (ref 65–100)
HCT VFR BLD AUTO: 25.4 % (ref 35–47)
HGB BLD-MCNC: 7.7 G/DL (ref 11.5–16)
MCH RBC QN AUTO: 25.6 PG (ref 26–34)
MCHC RBC AUTO-ENTMCNC: 30.3 G/DL (ref 30–36.5)
MCV RBC AUTO: 84.4 FL (ref 80–99)
NRBC # BLD: 0.03 K/UL (ref 0–0.01)
NRBC BLD-RTO: 0.3 PER 100 WBC
PLATELET # BLD AUTO: 270 K/UL (ref 150–400)
PMV BLD AUTO: 10.4 FL (ref 8.9–12.9)
POTASSIUM SERPL-SCNC: 3.6 MMOL/L (ref 3.5–5.1)
RBC # BLD AUTO: 3.01 M/UL (ref 3.8–5.2)
SODIUM SERPL-SCNC: 134 MMOL/L (ref 136–145)
WBC # BLD AUTO: 9.2 K/UL (ref 3.6–11)

## 2020-12-21 PROCEDURE — 74011250637 HC RX REV CODE- 250/637: Performed by: NURSE PRACTITIONER

## 2020-12-21 PROCEDURE — 74011250637 HC RX REV CODE- 250/637: Performed by: STUDENT IN AN ORGANIZED HEALTH CARE EDUCATION/TRAINING PROGRAM

## 2020-12-21 PROCEDURE — 97116 GAIT TRAINING THERAPY: CPT

## 2020-12-21 PROCEDURE — 99238 HOSP IP/OBS DSCHRG MGMT 30/<: CPT | Performed by: FAMILY MEDICINE

## 2020-12-21 PROCEDURE — 85027 COMPLETE CBC AUTOMATED: CPT

## 2020-12-21 PROCEDURE — 74011000250 HC RX REV CODE- 250: Performed by: ORTHOPAEDIC SURGERY

## 2020-12-21 PROCEDURE — 94640 AIRWAY INHALATION TREATMENT: CPT

## 2020-12-21 PROCEDURE — 74011250637 HC RX REV CODE- 250/637: Performed by: ORTHOPAEDIC SURGERY

## 2020-12-21 PROCEDURE — 77030012890

## 2020-12-21 PROCEDURE — 36415 COLL VENOUS BLD VENIPUNCTURE: CPT

## 2020-12-21 PROCEDURE — 80048 BASIC METABOLIC PNL TOTAL CA: CPT

## 2020-12-21 RX ORDER — CYCLOBENZAPRINE HCL 10 MG
10 TABLET ORAL
Qty: 60 TAB | Refills: 2 | Status: SHIPPED | OUTPATIENT
Start: 2020-12-21 | End: 2021-07-27 | Stop reason: ALTCHOICE

## 2020-12-21 RX ORDER — OXYCODONE HYDROCHLORIDE 5 MG/1
5-10 TABLET ORAL
Qty: 60 TAB | Refills: 0 | Status: SHIPPED | OUTPATIENT
Start: 2020-12-21 | End: 2020-12-28

## 2020-12-21 RX ORDER — FUROSEMIDE 20 MG/1
20 TABLET ORAL DAILY
Status: DISCONTINUED | OUTPATIENT
Start: 2020-12-21 | End: 2020-12-21 | Stop reason: HOSPADM

## 2020-12-21 RX ORDER — FUROSEMIDE 20 MG/1
20 TABLET ORAL DAILY
Qty: 30 TAB | Refills: 0 | Status: SHIPPED | OUTPATIENT
Start: 2020-12-21 | End: 2021-01-20

## 2020-12-21 RX ORDER — AMOXICILLIN 250 MG
1 CAPSULE ORAL
Qty: 60 TAB | Refills: 2 | Status: SHIPPED | OUTPATIENT
Start: 2020-12-21 | End: 2021-07-27 | Stop reason: ALTCHOICE

## 2020-12-21 RX ADMIN — CHOLECALCIFEROL TAB 25 MCG (1000 UNIT) 1 TABLET: 25 TAB at 09:07

## 2020-12-21 RX ADMIN — FUROSEMIDE 20 MG: 20 TABLET ORAL at 09:07

## 2020-12-21 RX ADMIN — OXYCODONE 5 MG: 5 TABLET ORAL at 11:24

## 2020-12-21 RX ADMIN — CETIRIZINE HYDROCHLORIDE 10 MG: 10 TABLET, FILM COATED ORAL at 09:07

## 2020-12-21 RX ADMIN — OXYCODONE 5 MG: 5 TABLET ORAL at 06:22

## 2020-12-21 RX ADMIN — OYSTER SHELL CALCIUM WITH VITAMIN D 1 TABLET: 500; 200 TABLET, FILM COATED ORAL at 09:07

## 2020-12-21 RX ADMIN — ARFORMOTEROL TARTRATE: 15 SOLUTION RESPIRATORY (INHALATION) at 08:00

## 2020-12-21 RX ADMIN — OXYCODONE 5 MG: 5 TABLET ORAL at 00:35

## 2020-12-21 RX ADMIN — FOLIC ACID 1 MG: 1 TABLET ORAL at 09:07

## 2020-12-21 RX ADMIN — ATORVASTATIN CALCIUM 10 MG: 10 TABLET, FILM COATED ORAL at 09:07

## 2020-12-21 RX ADMIN — Medication 400 MG: at 09:07

## 2020-12-21 RX ADMIN — FERROUS SULFATE TAB 325 MG (65 MG ELEMENTAL FE) 325 MG: 325 (65 FE) TAB at 09:07

## 2020-12-21 RX ADMIN — PANTOPRAZOLE SODIUM 40 MG: 40 TABLET, DELAYED RELEASE ORAL at 06:22

## 2020-12-21 RX ADMIN — DOCUSATE SODIUM 50MG AND SENNOSIDES 8.6MG 1 TABLET: 8.6; 5 TABLET, FILM COATED ORAL at 09:07

## 2020-12-21 RX ADMIN — POLYETHYLENE GLYCOL 3350 17 G: 17 POWDER, FOR SOLUTION ORAL at 09:07

## 2020-12-21 NOTE — PROGRESS NOTES
ORTHOPAEDIC LUMBAR FUSION PROGRESS NOTE    NAME:     Isaac Tim   :       1945   MRN:       [id number]   DATE:      2020    POD:    6 Days Post-Op  S/P:    Procedure(s):  L2-S1 LAMINECTOMY, L2-L5 FUSION,  INSTRUMENTATION, TLIF, BONE GRAFT    SUBJECTIVE:    C/O back pain along surgical incision  No leg pain or numbness  Denies nausea/vomiting, headache, dizziness, palpitations, near-syncope/syncope, chest pain or shortness of breath  Pain controlled    Hgb stable at 7.7, pt asymptomatic  FP also following for peripheral edema and medical mgmt    Recent Labs     20  0046   HGB 7.7*   HCT 25.4*   *   K 3.6   CL 98   CO2 31   BUN 18   CREA 0.91   *     Patient Vitals for the past 12 hrs:   BP Temp Pulse Resp SpO2   20 0725 123/78 98.7 °F (37.1 °C) 82 16 98 %   20 0414 136/74 99 °F (37.2 °C) 80 16 92 %   20 0000 120/71 98.1 °F (36.7 °C) 82 16 94 %   20 2043 (!) 142/64 98.8 °F (37.1 °C) 86 16 96 %   20 -- -- -- -- 97 %       EXAM:  Dressings clean, dry and intact   Positive strength/ROM bilat lower ext.   Neuro intact to sensation  BL LE peripheral edema improving, compression stockings in place  Calves, soft & nontender  BL LEs NVID      PLAN:  Continue prn PO pain medications  PT/OT, OOB w/ assist  Tolerating diet  D/c pending medical clearance from FP  Has been cleared for d/c by PT, hemovac drain is out      Maria Fernanda Stover [place]  Orthopaedic Surgery  Physician Assistant to Dr. Court Singh

## 2020-12-21 NOTE — PROGRESS NOTES
12/21/2020  Case Management Progress Note    1:57 PM   Noted that patient is discharging today, I have let [facility] know via [employer] that she is leaving. PCP appointment is set up. Patient has no other needs from CM standpoint at this time and is ready for discharge. Transition of Care Plan  1. Patient discharging home today  2.  will transport  3. [facility] home [facility]  4. Follow up with PCP, specialties as necessary    Care Management Interventions  PCP Verified by CM: Yes(Irene)  Mode of Transport at Discharge: Other (see comment)(Family)  Transition of Care Consult (CM Consult): [address]: No  Reason Outside [place] [address] [place]: Out of service area  [address]: No  Physical Therapy Consult: Yes  Occupational Therapy Consult: Yes  Speech Therapy Consult: No  Current Support Network: Lives with Spouse  Confirm Follow Up Transport: Family  Discharge Location  Discharge Placement: Home with home health    RL Mccormick    10:10 AM  Chart reviewed--no patient contact at this time  70year old female admitted 12/15 for spondylolisthesis of lumbar region  Patient's RUR is 12% making her a low risk for readmission  Patient is medically improving, has already been set up with [facility] and a rolling walker. Will need to let Brendon know when patient has a set discharge date. No other needs noted at this time. Will continue to follow. Transition of Care Plan  1. Patient is improving   2. Home with family assistance  3. [facility]  4. Follow up with PCP, specialties as necessary   5.  CM will continue to follow    RL Mccomrick

## 2020-12-21 NOTE — PROGRESS NOTES
I have reviewed discharge instructions with the patient. The patient verbalized understanding. Discharge medications reviewed with patient and appropriate educational materials and side effects teaching were provided. Pt's ride is 45 mins out from p/u. Pt does not have RX for pain for d/c- need to f/u w/ ortho on this prior to d/c. D/c instructions signed & placed in chart. IV removed. Pt to be assisted w/ dressing for d/c.

## 2020-12-21 NOTE — PROGRESS NOTES
[facility] RESIDENCY PROGRAM  PROGRESS NOTE     12/21/2020  PCP: Olya Acevedo MD     Assessment/Plan:     Rajiv Angel is a 70 y.o. female with PMHx of lumbar spinal stenosis, fibromyalgia, obesity, DM type II, RA,  asthma, HTN, HLD, GERD, and PAUL who is s/p L2-S1 laminectomy and L2-L5 fusion on 12/15. The [address] [place] [address] team was consulted for medical management.     Stable for discharge from a medical standpoint. Overnight Events: No acute events overnight. S/p L2-S1 laminectomy and L2-L5 fusion: On 12/15 with Dr. Dinorah Resendiz. - Management per Ortho     Bilateral lower extremity edema: Echo w/ EF 51-48%, mild LV diastolic dysfunction. BLLE doppler US prelim report with normal filling/compressibility and normal flow. Edema likely related to volume overload in the setting of underlying venous insufficiency. - Start Lasix 20 mg PO daily - recommend discharging patient home on this with close follow up with PCP   - Continue compression stockings as tolerated      Acute on Chronic Normocytic Anemia - Stable: BL Hgb ~10.0. S/p 1 unit PRBC on 12/18. Hgb stable at 7.7.   - Continue iron BID per ortho  - Continue home folate daily   - Follow up with PCP for repeat CBC outpatient      RA: Follows with Dr Trixie Leal (rheum). Receives methotrexate 25 mg and adalimumab 40 mg injections every Wednesday. - Hold methotrexate and adalimumab while inpatient   - Follow up with rheumatology      Asthma: Stable. On home albuterol PRN, Singulair, and dulera. - Aformeterol/budesonide neb BID  - Continue home singulair     GERD: Stable. On Nexium at home. - Protonix 40 mg BID while inpatient      HTN: Stable. - Continue home Cozaar 100 mg daily     HLD: Stable  - Continue home lipitor 10 mg daily     DM type II: Stable. Last HA1c 5.9 on 12/9. Hold home liraglutide.  - Follow up with PCP     PAUL: On CPAP at home. - Continue CPAP qhs    Obesity: BMI is 40. 16.  She is reportedly on liraglutide to aid in weight loss  - Hold liraglutide     Allergies: Patient on zyrtec daily for allergies  - Continue zyrtec     Pain Management: Per Ortho     DVT Prophylaxis: Per Ortho     Disposition: Per Ortho  Subjective:   Pt seen and examined at bedside. Afebrile and hemodynamically stable. She reports that her leg swelling has improved and she feels like they are almost back to baseline. She continues to deny any leg pain, CP, SOB, palpitations, dizziness, nausea, vomiting, abdominal discomfort. She would like to go home. Objective:   Physical examination  Patient Vitals for the past 24 hrs:   Temp Pulse Resp BP SpO2   20 0414 99 °F (37.2 °C) 80 16 136/74 92 %   20 0000 98.1 °F (36.7 °C) 82 16 120/71 94 %   20 2043 98.8 °F (37.1 °C) 86 16 (!) 142/64 96 %   20 -- -- -- -- 97 %   20 1352 -- -- -- 118/87 --   20 1240 98.2 °F (36.8 °C) 96 15 118/87 98 %   20 0817 -- -- -- -- 94 %   20 0757 98.4 °F (36.9 °C) 81 16 125/73 97 %      Temp (24hrs), Av.5 °F (36.9 °C), Min:98.1 °F (36.7 °C), Max:99 °F (37.2 °C)     O2 Flow Rate (L/min): 2 l/min   O2 Device: Room air    Date 20 - 20 - 20 0659   Shift [phone] 7831-4390 24 Hour Total 1573-7649 3356-7612 24 Hour Total   INTAKE   P.O. 1240  1240        P. O. 1240  1240      I. V.(mL/kg/hr) 0(0)  0        Volume (0.9% sodium chloride infusion 250 mL) 0  0      Shift Total(mL/kg) 1240(12.7)  9426(00.1)      OUTPUT   Urine(mL/kg/hr) 2050(1.7)  2050        Urine Voided 2050      Shift Total(mL/kg) 8450(72.1)  (20.9)      NET -810  -810      Weight (kg) 98 98 98 98 98 98     General:  Alert, cooperative, obese female in no distress, resting comfortably in bed    Head:  Normocephalic, atraumatic. Eyes:  Conjunctivae/corneas clear. Throat: Moist mucous membranes   Neck: Supple, symmetrical, trachea midline, no JVD. Lungs:   Clear to auscultation bilaterally. No wheezing, rales, rhonchi.     Heart: Regular rate and rhythm, no murmur   Abdomen:   Soft, non-tender. Bowel sounds normal.    Extremities: 1+ pitting edema in RLE, trace pitting edema in LLE. Compression stockings in place bilaterally. Pulses: 2+ and symmetric all extremities. Skin:  Compression stockings in place over bilateral lower extremities. Neurologic: No focal deficits. Alert and oriented x3.         Data Review:     CBC:  Recent Labs     12/21/20  0046 12/20/20 0020 12/19/20 0216   WBC 9.2 10.7 10.4   HGB 7.7* 7.7* 7.2*   HCT 25.4* 25.5* 24.0*    444 115     Metabolic Panel:  Recent Labs     12/21/20 0046 12/20/20 0020 12/19/20 0216   * 134* 139   K 3.6 3.7 5.1   CL 98 99 105   CO2 31 31 32   BUN 18 18 15   CREA 0.91 0.87 0.84   * 167* 155*   CA 8.6 8.9 8.8   ALB  --   --  2.5*   TBILI  --   --  0.6   ALT  --   --  22     Micro:  Lab Results   Component Value Date/Time    Culture result: MRSA NOT PRESENT 12/09/2020 12:22 PM    Culture result:  12/09/2020 12:22 PM         Screening of patient nares for MRSA is for surveillance purposes and, if positive, to facilitate isolation considerations in high risk settings. It is not intended for automatic decolonization interventions per se as regimens are not sufficiently effective to warrant routine use. Imaging:  Nc Xr Technologist Service    Result Date: 12/15/2020  INTERPRETATION PROVIDED FOR COMPLIANCE ONLY AT NO CHARGE FINDINGS: Intraoperative spot radiographs of the lumbar spine were obtained during L2-L5 posterior fusion. No operative complication is evident. IMPRESSION: Lumbar fusion in progress.     Medications reviewed  Current Facility-Administered Medications   Medication Dose Route Frequency    glucose chewable tablet 16 g  4 Tab Oral PRN    dextrose (D50W) injection syrg 12.5-25 g  12.5-25 g IntraVENous PRN    glucagon (GLUCAGEN) injection 1 mg  1 mg IntraMUSCular PRN    furosemide (LASIX) injection 40 mg  40 mg IntraVENous DAILY    folic acid (FOLVITE) tablet 1 mg  1 mg Oral DAILY    0.9% sodium chloride infusion 250 mL  250 mL IntraVENous PRN    ferrous sulfate tablet 325 mg  1 Tab Oral BID WITH MEALS    arformoterol 15 mcg/budesonide 0.5 mg neb solution   Nebulization BID RT    cetirizine (ZYRTEC) tablet 10 mg  10 mg Oral DAILY    atorvastatin (LIPITOR) tablet 10 mg  10 mg Oral DAILY    losartan (COZAAR) tablet 100 mg  100 mg Oral DAILY    albuterol-ipratropium (DUO-NEB) 2.5 MG-0.5 MG/3 ML  3 mL Nebulization Q4H PRN    calcium-vitamin D (OS-SUNNI +D3) 500 mg-200 unit per tablet 1 Tab  1 Tab Oral BID WITH MEALS    montelukast (SINGULAIR) tablet 10 mg  10 mg Oral QHS    magnesium oxide (MAG-OX) tablet 400 mg  400 mg Oral DAILY    pantoprazole (PROTONIX) tablet 40 mg  40 mg Oral ACB&D    cholecalciferol (VITAMIN D3) (1000 Units /25 mcg) tablet 1 Tab  1,000 Units Oral BID    benzocaine-menthoL (CEPACOL) lozenge 1 Lozenge  1 Lozenge Mucous Membrane PRN    sodium chloride (NS) flush 5-40 mL  5-40 mL IntraVENous Q8H    sodium chloride (NS) flush 5-40 mL  5-40 mL IntraVENous PRN    naloxone (NARCAN) injection 0.4 mg  0.4 mg IntraVENous PRN    senna-docusate (PERICOLACE) 8.6-50 mg per tablet 1 Tab  1 Tab Oral BID    polyethylene glycol (MIRALAX) packet 17 g  17 g Oral DAILY    bisacodyL (DULCOLAX) suppository 10 mg  10 mg Rectal DAILY PRN    acetaminophen (TYLENOL) tablet 650 mg  650 mg Oral Q6H PRN    oxyCODONE IR (ROXICODONE) tablet 5 mg  5 mg Oral Q3H PRN    oxyCODONE IR (ROXICODONE) tablet 10 mg  10 mg Oral Q3H PRN    diphenhydrAMINE (BENADRYL) injection 12.5 mg  12.5 mg IntraVENous Q6H PRN    cyclobenzaprine (FLEXERIL) tablet 10 mg  10 mg Oral TID PRN         Signed:   Malka Laguerre MD   Resident, Family Medicine      Attending note: Attending note to follow. ..

## 2020-12-21 NOTE — PROGRESS NOTES
Spoke w/ Celeste DOMINGUEZ, orders for pain medication were sent to pt's pharmacy, pt given updated copy of AVS- medications reviewed w/ pt.

## 2020-12-21 NOTE — PROGRESS NOTES
Problem: Mobility Impaired (Adult and Pediatric)  Goal: *Acute Goals and Plan of Care (Insert Text)  Description: FUNCTIONAL STATUS PRIOR TO ADMISSION: Patient was modified independent using a single point cane for functional mobility. HOME SUPPORT PRIOR TO ADMISSION: The patient lived with spouse but did not require assist.    Physical Therapy Goals  Initiated 12/16/2020    1. Patient will move from supine to sit and sit to supine , scoot up and down, and roll side to side in bed with independence within 4 days. 2. Patient will perform sit to stand with independence within 4 days. 3. Patient will ambulate with modified independence for 200 feet with the least restrictive device within 4 days. 4. Patient will verbalize and demonstrate understanding of spinal precautions (No bending, lifting greater than 5 lbs, or twisting; log-roll technique; frequent repositioning as instructed) within 4 days. Outcome: Progressing Towards Goal  Note:   PHYSICAL THERAPY TREATMENT  Patient: William Carreno (89 y.o. female)  Date: 12/21/2020  Diagnosis: Spondylolisthesis of lumbar region [M43.16]  Spinal stenosis of lumbar region with neurogenic claudication [M48.062]  Lumbar stenosis with neurogenic claudication [M48.062] <principal problem not specified>  Procedure(s) (LRB):  L2-S1 LAMINECTOMY, L2-L5 FUSION,  INSTRUMENTATION, TLIF, BONE GRAFT (N/A) 6 Days Post-Op  Precautions: Back, Fall, Skin  Chart, physical therapy assessment, plan of care and goals were reviewed. ASSESSMENT  Patient continues with skilled PT services and is progressing towards goals. (-) DVT thigh high compression stockings in place. Pt required up to CGA for functional mobility using RW with increased time. Denies dizziness, lightheadedness, SOB or increased pain with mobility tasks. Pt recall and maintain back precautions throughout session. Pt has zero steps to enter or within her home.  Pt is cleared for discharge from PT standpoint when medically stable    Current Level of Function Impacting Discharge (mobility/balance): CGA    Other factors to consider for discharge:          PLAN :  Patient continues to benefit from skilled intervention to address the above impairments. Continue treatment per established plan of care. to address goals. Recommendation for discharge: (in order for the patient to meet his/her long term goals)  Physical therapy at least 2 days/week in the home     This discharge recommendation:  Has been made in collaboration with the attending provider and/or case management    IF patient discharges home will need the following DME: RW has been delivered for discharge       SUBJECTIVE:   Patient stated I am doing ok, my legs swelled after I got that blood.     OBJECTIVE DATA SUMMARY:   Critical Behavior:  Neurologic State: Alert  Orientation Level: Oriented X4  Cognition: Appropriate decision making, Appropriate safety awareness, Follows commands  Safety/Judgement: Awareness of environment, Insight into deficits  Functional Mobility Training:  Bed Mobility:  Rolling: Additional time;Stand-by assistance  Supine to Sit: Additional time;Stand-by assistance  Sit to Supine: Contact guard assistance; Additional time  Scooting: Stand-by assistance        Transfers:  Sit to Stand: Contact guard assistance  Stand to Sit: Contact guard assistance                             Balance:  Sitting: Intact  Standing: Intact; With support  Ambulation/Gait Training:  Distance (ft): 80 Feet (ft)  Assistive Device: Walker, rolling;Gait belt;Brace/Splint  Ambulation - Level of Assistance: Contact guard assistance        Gait Abnormalities: Antalgic;Decreased step clearance        Base of Support: Narrowed     Speed/May: Pace decreased (<100 feet/min)                       Stairs:               Therapeutic Exercises:     Pain Rating:  3/10    Activity Tolerance:   Good    After treatment patient left in no apparent distress:   Supine in bed and Call bell within reach    COMMUNICATION/COLLABORATION:   The patients plan of care was discussed with: Registered nurse.      Oly Dodson   Time Calculation: 21 mins

## 2020-12-21 NOTE — DISCHARGE INSTRUCTIONS
Family Practice recommendations:      Start Lasix 20 mg daily - recommend discharging patient home on this with close follow up with PCP   - Continue compression stockings as tolerated      Hgb stable at 7.7.   - Follow up with PCP for repeat CBC outpatient       YOUR POST-OPERATIVE [address]                       Lumbar Spinal Surgery Discharge Instructions   Dr. Latrell Harp  [phone]    Activity:    Fu.Los You are going home a well person, be as active as possible. Your only exercise should be walking. Start with short frequent walks and increase your walking distance each day. Start with walking twice a day for 5 minutes and increase your distance each day 2-3 minutes until you reach 25 minutes twice a day. Limit the amount of time you sit to 20-30 minute intervals. Sitting for prolonged periods of time will be uncomfortable for you following your surgery.  No bending, lifting (of 5lbs or more), twisting, or straining.  Do not drive until your doctor states you may do so. However, you may ride in a car for short distances.  If you are required to wear a brace, you should wear it at all times when you are out of bed. You may remove it when sleeping unless your physician advises you against it.  When you are in the bed, you may lay on your back or on either side. Do not lie on your stomach.  You may resume sexual relations 3-4 weeks after surgery depending on how you are feeling.  Continue to use your incentive spirometer for deep breathing exercises. Driving:   You may not drive or return to work until instructed by your physician. However, you may ride in the car for short periods of time. Brace:   If you have a back brace, you should wear your brace at all times when you are out of bed. Do not wear the brace while in bed or showering.  Remember to always wear a cotton t-shirt underneath your brace.    Do not bend or twist when your brace is off. Diet:   You may resume your regular home diet as tolerated. If your throat is sore, you should eat soft foods for the first couple of days.  Be sure to drink plenty of fluids; it is important to keep yourself hydrated.  Avoid alcoholic beverages and ABSOLUTELY NO tobacco products. Tobacco products will interfere with your healing. If you continue to use tobacco, you may end up needing another surgery in the future. Dressing: You have on a Prineo dressing. This is a waterproof bacteriostatic dressing that  requires no post-operative care. Please do not peel the dressing off, or apply any  oil based products, as they may expedite the deterioration of the mesh. The  dressing will slowly chip off on its own.  Do not rub or apply lotion or ointments to incision site. Shower:   You may shower 5 days after your surgery.  You may remove your brace during showers.  NO not use tub baths, swimming pools or Jacuzzis. Medications:   Check with your physician before taking any anti-inflammatory medications. (Advil, Aleve, Ibuprofen, Aspirin)   Take your pain medication as directed   Do NOT take additional Tylenol if your prescribed pain medication has acetaminophen in it (Endocet/Percocet, Lortab, Norco)   It is important to have regular bowel movements. Pain medications can be constipating. Stool softeners, warm prune juice, increasing your water intake, and increasing fiber in your diet can help in preventing constipation.  Do NOT take laxatives if at all possible except in severe situations. It can result in a vicious cycle of constipation and diarrhea. Follow-Up    Please call ASAP to schedule your 1st post-operative appointment. This should be approximately 3 weeks from your surgery date. Notify your physician in you develop any of the following conditions:   Fever above 101 degrees for 24 hours.  Nausea or vomiting.  Severe headache.    Inability to urinate   Loss of bowel or bladder function (sudden onset of incontinence)   Changes in sensation in your extremities (numbness, tingling, loss of color).  Severe pain or pain not relieved by medications.  Redness, swelling, or drainage from your incision.  Persistent pain in the chest.    Pain in the calf of either leg.  Increased weakness (if this is greater than before your surgery). If you have any questions about your dressing contact your Orthopaedic Surgeons office. OFFICE OF DR. Radha Solo   [phone]  OUR NEW ADDRESS IS [address] [place] [address], [facility] [address] [facility]     ** IMPORTANT: UNDER YOUR OPTIFOAM DRESSING, YOU HAVE AN ADHESIVE MESH DIRECTLY OVER YOUR INCISION. THIS MESH WAS STERILELY GLUED TO YOUR SKIN DURING SURGERY. DO NOT REMOVE THIS. NO ONE ELSE SHOULD REMOVE IT EITHER. IT WILL COME OFF ON ITS OWN OVER TIME    YOUR OPTIFOAM DRESSING SHOULD REMAIN IN PLACE FOR 1 WEEK. IT IS A WATERPROOF DRESSING AS LONG AS IT'S PLACEMENT IS INTACT. YOU CAN SHOWER AS INDICATED BELOW IN YOUR DISCHARGE INSTRUCTIONS    * WEAR YOUR BRACE AS ADVISED    * NO DRIVING UNTIL YOU ARE CLEARED TO DO SO BY YOUR SURGEON    * LIMIT LIFTING, BENDING AND TWISTING.  NO LIFTING MORE THAN 5 LBS    * MAKE SURE YOU ARE GETTING GOOD NUTRITION (Lean Protein, Vitamin D AND Calcium)    * DO NOT TAKE ANY NSAIDs FOR THE FIRST 3 MONTHS AFTER SURGERY (such as Advil/Ibuprofen/Motrin, Aleve/Naproxen/Naprosyn, Diclofenac, Celebrex, Meloxicam, Indomethacin, Goody's powder, BC powder etc.)    * NO NICOTINE PRODUCTS    * FULLY READ YOUR DISCHARGE INSTRUCTIONS

## 2021-01-04 NOTE — DISCHARGE SUMMARY
DISCHARGE SUMMARY     Patient: Sophie Hurst                             Medical Record Number: [id number]                : 1945  Age: 70 y.o. Admit Date: 12/15/2020  Discharge Date: 2020  Admission Diagnosis: Spondylolisthesis of lumbar region [M43.16]  Spinal stenosis of lumbar region with neurogenic claudication [M48.062]  Lumbar stenosis with neurogenic claudication [M48.062]  Discharge Diagnosis: Spondylolisthesis of lumbar region   Procedures: Procedure(s):  L2-S1 LAMINECTOMY, L2-L5 FUSION,  INSTRUMENTATION, TLIF, BONE GRAFT  Surgeon: Aundrea Garcia MD  Co-surgeon:   Assistants:   Anesthesia: General  Complications: None     History of Present Illness:  Sophie Hurst is a 70 y.o. female with a history of intractable low back and radiculopathy. Despite conservative management and after clinical and radiographic evaluation, it was determined that she suffered from lumbar stenosis  and lumbar spondylolisthesis and would benefit from Procedure(s):  L2-S1 LAMINECTOMY, L2-L5 FUSION,  INSTRUMENTATION, TLIF, BONE GRAFT, which she consented to undergo after a discussion of the risks, benefits, alternatives, rehab concerns, and potential complications of surgery. Hospital Course:  Shana Francis tolerated the procedure well. She was transferred  to the recovery room in stable condition. After a brief stay, the patient was then transferred to the Orthopedic floor. On postoperative day #1, the dressing was clean and dry and she was neurovascularly intact. The patient was afebrile and vital signs were stable. Calves were soft and non-tender bilaterally. Sophie Hurst developed post-operative anemia due to surgical blood loss. A blood transfusion was administered. Her hemoglobin did improve. Also during her admission, Ms. Francis developed peripheral edema in her lower extremities. Family Practice was consulted. Her work-up was unremarkable. She was given lasix. She was medically cleared for discharge.    Shana Prado Tito made excellent progress with physical therapy and was discharged to Home with home health in stable condition on postoperative day 6. She was provided with routine postoperative instructions and advised to follow up in my office in 2-3 weeks following discharge from the hospital.  She was given prescriptions for medication to control post-operative symptoms. She will also follow up with her primary care physician    Discharge Medications:  Discharge Medication List as of 12/21/2020  1:40 PM      START taking these medications    Details   furosemide (LASIX) 20 mg tablet Take 1 Tab by mouth daily for 30 days. , Normal, Disp-30 Tab, R-0      cyclobenzaprine (FLEXERIL) 10 mg tablet Take 1 Tab by mouth three (3) times daily as needed for Muscle Spasm(s). , Normal, Disp-60 Tab, R-2      senna-docusate (PERICOLACE) 8.6-50 mg per tablet Take 1 Tab by mouth two (2) times daily as needed for Constipation. , Normal, Disp-60 Tab, R-2      oxyCODONE IR (ROXICODONE) 5 mg immediate release tablet Take 1-2 Tabs by mouth every four (4) hours as needed (post-operative pain) for up to 7 days. Max Daily Amount: 60 mg., Normal, Disp-60 Tab, R-0         CONTINUE these medications which have NOT CHANGED    Details   B.infantis-B.ani-B.long-B.bifi (Probiotic 4X) 10-15 mg TbEC Take  by mouth daily. , Historical Med      OTHER Methycellulose 500mg PO daily, Historical Med      olopatadine (PATANASE) 0.6 % spry two (2) times a day., Historical Med      atorvastatin (LIPITOR) 10 mg tablet Take  by mouth daily. , Historical Med      losartan (COZAAR) 100 mg tablet Take 100 mg by mouth daily. , Historical Med      Cholecalciferol, Vitamin D3, 1,000 unit cap Take 1,000 Units by mouth two (2) times a day., Historical Med      DULERA 200-5 mcg/actuation HFA inhaler Take 2 Puffs by inhalation two (2) times a day., Historical Med      montelukast (SINGULAIR) 10 mg tablet Take 10 mg by mouth nightly., Historical Med      esomeprazole (NEXIUM) 40 mg capsule Take  by mouth nightly., Historical Med      cetirizine (ZYRTEC) 10 mg tablet Take  by mouth daily. , Historical Med      liraglutide (SAXENDA SC) 0.06 mg by SubCUTAneous route nightly. Self inject, Historical Med      albuterol (PROVENTIL HFA, VENTOLIN HFA, PROAIR HFA) 90 mcg/actuation inhaler Take 1 Puff by inhalation as needed for Wheezing. Used as rescue inhaler, Historical Med      methotrexate 25 mg/mL chemo injection 1 mL by SubCUTAneous route every Wednesday., Normal, Disp-12 Vial,R-1WITH PRESERVATIVE      adalimumab (Humira,CF, Pen) 40 mg/0.4 mL injection pen 0.4 mL by SubCUTAneous route every Wednesday. Indications: rheumatoid arthritis, Normal, Disp-2 JZE,W-20      folic acid (FOLVITE) 1 mg tablet Take 1 Tab by mouth daily. , Normal, Disp-90 Tab, R-4      aspirin delayed-release 81 mg tablet Take  by mouth daily. , Historical Med      calcium-cholecalciferol, D3, (CALTRATE 600+D) tablet Take 1 Tab by mouth two (2) times a day., Historical Med      magnesium oxide (MAG-OX) 400 mg tablet Take 400 mg by mouth daily. , Historical Med      glucosamine-chondroit-vit c-mn (GLUCOSAMINE 1500 COMPLEX) 500-400 mg capsule Take 2 Caps by mouth nightly., Historical Med             Tresia Bloch, [place]  12/21/2020  Orthopaedic Surgery  Physician Assistant to Dr. Justo Starr

## 2021-01-26 ENCOUNTER — DOCUMENTATION ONLY (OUTPATIENT)
Dept: RHEUMATOLOGY | Age: 76
End: 2021-01-26

## 2021-04-23 ENCOUNTER — VIRTUAL VISIT (OUTPATIENT)
Dept: RHEUMATOLOGY | Age: 76
End: 2021-04-23
Payer: COMMERCIAL

## 2021-04-23 DIAGNOSIS — M06.09 SERONEGATIVE RHEUMATOID ARTHRITIS OF MULTIPLE SITES (HCC): Primary | ICD-10-CM

## 2021-04-23 DIAGNOSIS — Z79.60 LONG-TERM USE OF IMMUNOSUPPRESSANT MEDICATION: ICD-10-CM

## 2021-04-23 DIAGNOSIS — E55.9 VITAMIN D DEFICIENCY: ICD-10-CM

## 2021-04-23 DIAGNOSIS — D63.8 ANEMIA, CHRONIC DISEASE: ICD-10-CM

## 2021-04-23 PROCEDURE — G8427 DOCREV CUR MEDS BY ELIG CLIN: HCPCS | Performed by: INTERNAL MEDICINE

## 2021-04-23 PROCEDURE — 99215 OFFICE O/P EST HI 40 MIN: CPT | Performed by: INTERNAL MEDICINE

## 2021-04-23 PROCEDURE — G8432 DEP SCR NOT DOC, RNG: HCPCS | Performed by: INTERNAL MEDICINE

## 2021-04-23 PROCEDURE — 1090F PRES/ABSN URINE INCON ASSESS: CPT | Performed by: INTERNAL MEDICINE

## 2021-04-23 PROCEDURE — G8399 PT W/DXA RESULTS DOCUMENT: HCPCS | Performed by: INTERNAL MEDICINE

## 2021-04-23 PROCEDURE — 1101F PT FALLS ASSESS-DOCD LE1/YR: CPT | Performed by: INTERNAL MEDICINE

## 2021-04-23 PROCEDURE — 3017F COLORECTAL CA SCREEN DOC REV: CPT | Performed by: INTERNAL MEDICINE

## 2021-04-23 NOTE — PROGRESS NOTES
REASON FOR VISIT    This is a follow-up visit for Ms. Francis for     ICD-10-CM   1. Seronegative rheumatoid arthritis of multiple sites [facility]) M06.09     The patient has consented for synchronous (real-time) Telemedicine (audio-video technology) on 4/23/2021 for their care to be delivered over telemedicine in place of their regularly scheduled office visit pursuant to the emergency declaration under the [address] [place] [address] authority and the [employer] Act, this Virtual  Visit was conducted, with patient's consent, to reduce the patient's risk of exposure to COVID-19 and provide continuity of care for an established patient. Services were provided through a video synchronous discussion virtually to substitute for in-person clinic visit.     Inflammatory arthritis phenotype includes:  Anti-CCP positive: no  Rheumatoid factor positive: no  Erosive disease: yes   Extra-articular manifestations include: none    Immunosuppression Screening (1/24/2020):  Quantiferon TB: negative  PPD:  Not performed  Hepatitis B: negative   Hepatitis C: negative      Therapy History includes:  Current DMARD therapy include: methotrexate 25 mg SubQ every Wednesday (1/30/2020 to 12/2020 due to back surgery, 1/2021 to present), Humira 40 mg every Wednesday (7/23/2019 to 12/2020 due to back surgery, 1/2021 to present)  Prior DMARD therapy include: hydroxychloroquine 400 mg daily (1/2016 to 12/2017), hydroxychloroquine 600 mg daily (12/2017 to 1/21/2019; per PCP), methotrexate 20 mg every Wednesday (10/30/2019 to 1/24/2020), Humira 40 mg every 14 days (2/2019 to 7/23/2019)  Discontinued DMARDs because of inefficacy: hydroxychloroquine   Discontinued DMARDs because of side effects: None  Relative Contra-Indicated DMARDs because of fatty liver/hepatitis: hydroxychloroquine, sulfasalazine, methotrexate, leflunomide    HISTORY OF PRESENT ILLNESS    Ms. Jose C Henderson returns for a follow-up. On her last visit, I continued methotrexate 25 mg SubQ every Wednesday and Humira 40 mg every Wednesday, which she has been good tolerance. She had back surgery on 12/15/2020 with Dr. Debi Copeland, who helped her. She had blood transfusion during her hospitalization and was told to take iron. Her Hct was in the 20's per report. She has not seen a hematologist.     Today, she feels very well. She has pain and swelling in her left 5th PIP and right 2nd PIP for the past several weeks and week, respectively. The pain comes and goes and is not related to use. She has been taking care of her her  who had hip surgery. She denies pain, swelling, or stiffness in her hands or wrists. She received her COVID vaccines. She denies fever, weight loss, blurred vision, vision loss, oral ulcers, ankle swelling, dry cough, dyspnea, nausea, vomiting, dysphagia, abdominal pain, black or bloody stool, fall since last visit, rash, easy bruising and increased thirst.    Most recent toxicity monitoring by blood work was done on 12/19/2020and did not reveal any significant adverse effects, except albumin 2.5 g/dL. Hct on 12/21/2020 HCT 25.4%    Most recent inflammatory markers from 1/24/2020 revealed a ESR 10 mm/hr and CRP <1 mg/L. The patient has had interval hospital admissions for L2-S1 LAMINECTOMY, L2-L5 FUSION,  INSTRUMENTATION, TLIF, BONE GRAFT surgery, but no infection. REVIEW OF SYSTEMS    A comprehensive review of systems was performed and pertinent results are documented in the HPI, review of systems is otherwise non-contributory.     PAST MEDICAL HISTORY    She has a past medical history of Asthma, Chronic sinusitis, Fibromyalgia, GERD (gastroesophageal reflux disease), Hypertension, Leg wound, left (11/1/20 to present), Morbid obesity with BMI of 40.0-44.9, adult ([facility] [address] (12/09/2020), Multiple allergies, Obstructive sleep apnea on CPAP, Osteopenia, and RA (rheumatoid arthritis) [facility]).    FAMILY HISTORY    Her family history includes Coronary Artery Disease in her father; Diabetes in her mother. SOCIAL HISTORY    She reports that she has never smoked. She has never used smokeless tobacco. She reports that she does not drink alcohol or use drugs. MEDICATIONS    Current Outpatient Medications   Medication Sig    cyclobenzaprine (FLEXERIL) 10 mg tablet Take 1 Tab by mouth three (3) times daily as needed for Muscle Spasm(s).  senna-docusate (PERICOLACE) 8.6-50 mg per tablet Take 1 Tab by mouth two (2) times daily as needed for Constipation.  liraglutide (SAXENDA SC) 0.06 mg by SubCUTAneous route nightly. Self inject    B.infantis-B.ani-B.long-B.bifi (Probiotic 4X) 10-15 mg TbEC Take  by mouth daily.  OTHER Methycellulose 500mg PO daily    albuterol (PROVENTIL HFA, VENTOLIN HFA, PROAIR HFA) 90 mcg/actuation inhaler Take 1 Puff by inhalation as needed for Wheezing. Used as rescue inhaler    methotrexate 25 mg/mL chemo injection 1 mL by SubCUTAneous route every Wednesday.  adalimumab (Humira,CF, Pen) 40 mg/0.4 mL injection pen 0.4 mL by SubCUTAneous route every Wednesday. Indications: rheumatoid arthritis    folic acid (FOLVITE) 1 mg tablet Take 1 Tab by mouth daily.  aspirin delayed-release 81 mg tablet Take  by mouth daily.  olopatadine (PATANASE) 0.6 % spry two (2) times a day.  atorvastatin (LIPITOR) 10 mg tablet Take  by mouth daily.  losartan (COZAAR) 100 mg tablet Take 100 mg by mouth daily.  calcium-cholecalciferol, D3, (CALTRATE 600+D) tablet Take 1 Tab by mouth two (2) times a day.  magnesium oxide (MAG-OX) 400 mg tablet Take 400 mg by mouth daily.  Cholecalciferol, Vitamin D3, 1,000 unit cap Take 1,000 Units by mouth two (2) times a day.  DULERA 200-5 mcg/actuation HFA inhaler Take 2 Puffs by inhalation two (2) times a day.  glucosamine-chondroit-vit c-mn (GLUCOSAMINE 1500 COMPLEX) 500-400 mg capsule Take 2 Caps by mouth nightly.     montelukast (SINGULAIR) 10 mg tablet Take 10 mg by mouth nightly.  esomeprazole (NEXIUM) 40 mg capsule Take  by mouth nightly.  cetirizine (ZYRTEC) 10 mg tablet Take  by mouth daily. No current facility-administered medications for this visit. ALLERGIES    Allergies   Allergen Reactions    Doxycycline Itching    Cottonseed Cough    Lamisil [Terbinafine] Rash    Malt Extract Cough              PHYSICAL EXAMINATION    There were no vitals taken for this visit. There is no height or weight on file to calculate BMI. General: Patient is alert, oriented x 3, not in acute distress    HEENT:   Sclerae are not injected and appear moist.  There is no alopecia. Chest:  Breathing comfortably at room air. Musculoskeletal:  A comprehensive musculoskeletal exam was NOT performed for all joints of each upper and lower extremity and assessed for swelling, tenderness and range of motion.       Previous Exam  Bilateral Armando and [place] nodes  Bilateral knee crepitus without effusion with patellar laxity and crepitus    Z-Deformities:   no  [place] Neck Deformities:  no  [place]ierre's Deformities:  no  Ulnar Deviation:   yes  MCP Subluxation:  no    Joint Count 1/24/2020 10/24/2019 7/23/2019 4/23/2019 1/21/2019   Patient pain (0-100) 5 10 5 5 10   MHAQ 0.25 0.5 0.5 0.25 0.5   Left wrist- Tender 1 1 1 - -   Left wrist- Swollen 0 1 0 - -   Left 1st MCP - Tender - 1 1 1 1   Left 1st MCP - Swollen - 1 1 1 1   Left 2nd MCP - Tender 1 0 1 1 1   Left 2nd MCP - Swollen 1 1 1 1 1   Left 3rd MCP - Tender 1 1 1 1 1   Left 3rd MCP - Swollen - 1 1 1 1   Left 4th MCP - Tender - - 1 - -   Left 4th MCP - Swollen - - 0 - -   Left 5th MCP - Tender - - 1 - 1   Left 5th MCP - Swollen - - 1 - 0   Left 2nd PIP - Tender - - 1 1 1   Left 2nd PIP - Swollen - - 1 0 1   Left 3rd PIP - Tender 1 - 1 1 1   Left 3rd PIP - Swollen 1 - 1 1 1   Left 4th PIP - Tender - - 1 - 1   Left 4th PIP - Swollen - - 1 - -   Left 5th PIP - Tender - - 1 - -   Left 5th PIP - Swollen - - 0 - -   Right elbow - Tender - - - - 1   Right elbow - Swollen - - - - 0   Right wrist- Tender - 1 0 1 -   Right wrist- Swollen - 0 1 1 -   Right 1st MCP - Tender 1 1 0 1 1   Right 1st MCP - Swollen 1 1 1 1 1   Right 2nd MCP - Tender - - 0 1 1   Right 2nd MCP - Swollen - - 1 0 1   Right 3rd MCP - Tender 1 1 0 1 1   Right 3rd MCP - Swollen 1 1 1 1 1   Right 4th MCP - Tender 1 1 - 1 1   Right 4th MCP - Swollen 1 0 - 1 1   Right 5th MCP - Tender 0 - 0 1 1   Right 5th MCP - Swollen 1 - 1 1 1   Right 2nd PIP - Tender 0 1 1 1 1   Right 2nd PIP - Swollen 1 1 1 1 1   Right 3rd PIP - Tender 1 1 1 1 1   Right 3rd PIP - Swollen 1 1 1 1 1   Right 4th PIP - Tender 1 1 1 1 1   Right 4th PIP - Swollen 1 1 1 1 1   Right 5th PIP - Tender - - - - 1   Right 5th PIP - Swollen - - - - 1   Tender Joint Count (Total) 9 10 13 14 17   Swollen Joint Count (Total) 9 9 15 12 14   Physician Assessment (0-10) 3 3 5 4 5   Patient Assessment (0-10) 0.5 1 5.5 0.5 2   CDAI Total (calculated) 21.5 23 38.5 30.5 38     DATA REVIEW    Laboratory     Recent laboratory results were reviewed, summarized, and discussed with the patient. Imaging    Musculoskeletal Ultrasound    None    Radiographs    Bilateral Hand 1/21/2019: RIGHT: osteopenia. No acute fracture or dislocation. Radiocarpal and intercarpal joints are age-appropriate. No erosion of intercarpal or CMC joints. First MCP joint space narrowing is increased. There are small marginal osteophytes. No erosion. Anterior subluxation of the third MCP joint and joint space narrowing are increased. There are are increased subtle erosions of the third metacarpal head. Fifth MCP joint erosion is unchanged. Third PIP joint space narrowing is mild and unchanged. The DIP joints are within normal limits. LEFT: osteopenia. No acute fracture or dislocation. Mild anterior subluxation of the third MCP joint is increased.  Radiocarpal and intercarpal joints are within normal limits. First CMC joint osteoarthritis is mild and new. Third MCP joint space narrowing and subtle erosions are increased. First MCP joint osteoarthritis is mild and new. IP joints are within normal limits. No periosteal reaction or chondrocalcinosis. Bilateral Foot 1/21/2019: RIGHT: osteopenia. No acute fracture or dislocation. Intertarsal and TMT joints are within normal limits. Hallux valgus angulation measures 45 degrees. Mild first MTP joint osteoarthritis. MTS joint subluxation and arthritis are partially imaged. Remaining MTP joints are within normal limits. IP joints are difficult to visualize given the toe positioning. There are soft tissue calcifications adjacent to the distal tibia. LEFT: osteopenia. No acute fracture or dislocation. Moderate plantar calcaneal spur. Mild talonavicular joint osteoarthritis. Possible third TMT joint osteoarthritis. Hallux valgus angulation measures 40 degrees. Mild first MTP joint osteoarthritis. MTS joint subluxation and arthritis are partially imaged. Mild third PIP and DIP joint osteoarthritis. No evidence of IP joint erosion. Soft tissue calcifications are adjacent to the distal tibia. No periosteal reaction or chondrocalcinosis. Bilateral Hand 11/25/2015: RIGHT: no acute fracture or dislocation. Alignment is anatomic. Severe joint space narrowing is seen at the third MCP joint. Small erosions are suspected in the head of the third metacarpal. Mild joint space narrowing is seen in the first MCP joint. Generalized osteopenia is noted. Soft tissue swelling surrounds the third MCP joint. LEFT: no acute fracture or dislocation. Alignment is anatomic. Severe joint space is seen at the third  MCP joint mild joint space. Mild joint space narrowing is seen at the first MCP joint. No erosions are seen. Generalized osteopenia is noted. There is the suggestion of mild soft tissue swelling surrounding the third MCP joint.       CT Imaging    CT Chest without contrast 1/16/2017: The thoracic inlet is unremarkable. No mediastinal nor hilar masses nor adenopathy. No thoracic aortic aneurysm. The central airways are patent. Minimal hypoventilation is appreciated panel placed. The lungs are otherwise clear. There is diffuse low attenuation within the liver parenchyma. The remaining visualized upper abdominal viscera are unremarkable. There are no aggressive appearing osseous lesions. Degenerative changes within the mid thoracic spine. MR Imaging    None    DXA      DXA 11/01/2016: (excluded L1, right hip, distal radius) lumbar spine L1-L3 T score 1.5 (BMD 1.183 g/cm2), left femoral neck T score: -0.5 (0.880 g/cm2). FRAX score 17 % probability in 10 years for major osteoporotic fracture and 3.7 % 10 year probability of hip fracture. ASSESSMENT AND PLAN    This is a follow-up visit for Ms. Francis. 1) Seronegative Erosive Rheumatoid Arthritis. She is maintained on methotrexate 25 mg every Wednesday and Humira 40 mg every Wednesday with good tolerance. She denies inflammatory symptoms. Her CDAI was previously 21.5 (previously 23, 38.5, 30.5, 38) with 9 tender and 9 swollen joints, consistent with high disease activity. I will continue treatment. 2) Long Term Use of Immunosuppressants. The patient remains on high risk immunomodulatory medications (methotrexate, Humira) and requires frequent toxicity monitoring by blood work to evaluate for toxicities. Respective labs were ordered (see below orders for details). 3) Bilateral Patellofemoral Arthritis. This was not an active issue. I had referred her to physical therapy, which helped. 4) Bilateral Knee Osteoarthritis. This was not an active issue. 5) Bilateral Hand Osteoarthritis. This was not an active issue. 6) Chronic Back Pain. She follows with Dr. Bo Bean and is s/p surgery and feels well. 7) Long Term Use of Bisphosphonates.  She reports taking Fosamax for around 5 years or more. Felice Better al reported that the risk of atypical femoral fractures is low, with an incidence of up to 50 per 100,000 person-years during the first 5 years of bisphosphonate use, resulting in a clear positive benefit/risk ratio within this time frame (J Bone  Res. 2016 Jan;31(1):16-35). I asked her to send me her most recent DXA with Dr. Erum Huffman. 8) Severe Anemia. I referred her to hematology. The patient voiced understanding of the aforementioned assessment and plan. The patient has consented for synchronous (real-time) Telemedicine (audio-video technology) on 4/23/2021 for their care to be delivered over telemedicine in place of their regularly scheduled office visit pursuant to the emergency declaration under the [address] [place] [address], [facility] waiver authority and the [employer] Act, this Virtual  Visit was conducted, with patient's consent, to reduce the patient's risk of exposure to COVID-19 and provide continuity of care for an established patient. A total of 45 minutes was spent on this visit, reviewing interval notes, interval testing results, ordering tests, refilling medications, documenting the findings in the note, patient education, counseling, and coordination of care as described above. All questions asked and answered. Services were provided through a video synchronous discussion virtually to substitute for in-person clinic visit. TODAY'S ORDERS    Orders Placed This Encounter    QUANTIFERON-TB GOLD PLUS    CBC WITH AUTOMATED DIFF    METABOLIC PANEL, COMPREHENSIVE    C REACTIVE PROTEIN, QT    SED RATE (ESR)    METHOTREXATE POLYGLUTAMATES    CHRONIC HEPATITIS PANEL    VITAMIN D, 25 HYDROXY    [facility]     No future appointments.    Florecita Colorado MD, [facility]    Adult Rheumatology   Rheumatology Ultrasound Certified  General acute hospital  A Part of Ayde Wright, [facility]   Phone [phone]  Fax [phone]

## 2021-05-20 LAB
25(OH)D3+25(OH)D2 SERPL-MCNC: 54.3 NG/ML (ref 30–100)
ALBUMIN SERPL-MCNC: 4.2 G/DL (ref 3.7–4.7)
ALBUMIN/GLOB SERPL: 2.1 {RATIO} (ref 1.2–2.2)
ALP SERPL-CCNC: 182 IU/L (ref 39–117)
ALT SERPL-CCNC: 25 IU/L (ref 0–32)
AST SERPL-CCNC: 21 IU/L (ref 0–40)
BASOPHILS # BLD AUTO: 0 X10E3/UL (ref 0–0.2)
BASOPHILS NFR BLD AUTO: 0 %
BILIRUB SERPL-MCNC: 0.5 MG/DL (ref 0–1.2)
BUN SERPL-MCNC: 18 MG/DL (ref 8–27)
BUN/CREAT SERPL: 18 (ref 12–28)
CALCIUM SERPL-MCNC: 9.8 MG/DL (ref 8.7–10.3)
CHLORIDE SERPL-SCNC: 104 MMOL/L (ref 96–106)
CO2 SERPL-SCNC: 24 MMOL/L (ref 20–29)
COMMENT, 144067: NORMAL
CREAT SERPL-MCNC: 1 MG/DL (ref 0.57–1)
CRP SERPL-MCNC: 2 MG/L (ref 0–10)
EOSINOPHIL # BLD AUTO: 0.3 X10E3/UL (ref 0–0.4)
EOSINOPHIL NFR BLD AUTO: 4 %
ERYTHROCYTE [DISTWIDTH] IN BLOOD BY AUTOMATED COUNT: 15.3 % (ref 11.7–15.4)
ERYTHROCYTE [SEDIMENTATION RATE] IN BLOOD BY WESTERGREN METHOD: 5 MM/HR (ref 0–40)
GAMMA INTERFERON BACKGROUND BLD IA-ACNC: 0.02 IU/ML
GLOBULIN SER CALC-MCNC: 2 G/DL (ref 1.5–4.5)
GLUCOSE SERPL-MCNC: 132 MG/DL (ref 65–99)
HBV CORE AB SERPL QL IA: NEGATIVE
HBV CORE IGM SERPL QL IA: NEGATIVE
HBV E AB SERPL QL IA: NEGATIVE
HBV E AG SERPL QL IA: NEGATIVE
HBV SURFACE AB SER QL: NON REACTIVE
HBV SURFACE AG SERPL QL IA: NEGATIVE
HCT VFR BLD AUTO: 38.9 % (ref 34–46.6)
HCV AB S/CO SERPL IA: <0.1 S/CO RATIO (ref 0–0.9)
HGB BLD-MCNC: 13 G/DL (ref 11.1–15.9)
IMM GRANULOCYTES # BLD AUTO: 0 X10E3/UL (ref 0–0.1)
IMM GRANULOCYTES NFR BLD AUTO: 0 %
LYMPHOCYTES # BLD AUTO: 1.8 X10E3/UL (ref 0.7–3.1)
LYMPHOCYTES NFR BLD AUTO: 25 %
M TB IFN-G BLD-IMP: NEGATIVE
M TB IFN-G CD4+ BCKGRND COR BLD-ACNC: 0.03 IU/ML
MCH RBC QN AUTO: 33.2 PG (ref 26.6–33)
MCHC RBC AUTO-ENTMCNC: 33.4 G/DL (ref 31.5–35.7)
MCV RBC AUTO: 100 FL (ref 79–97)
METHOTREXATE PG1: 237 NMOL/L RBC
METHOTREXATE PG2: 19 NMOL/L RBC
METHOTREXATE PG3: 74 NMOL/L RBC
METHOTREXATE PG4: 55 NMOL/L RBC
METHOTREXATE PG5: 22 NMOL/L RBC
METHOTREXATE-PG TOTAL: 406 NMOL/L RBC
MITOGEN IGNF BLD-ACNC: >10 IU/ML
MONOCYTES # BLD AUTO: 0.7 X10E3/UL (ref 0.1–0.9)
MONOCYTES NFR BLD AUTO: 10 %
MTXPGSRA INTERPRETATION: NORMAL
NEUTROPHILS # BLD AUTO: 4.3 X10E3/UL (ref 1.4–7)
NEUTROPHILS NFR BLD AUTO: 61 %
PLATELET # BLD AUTO: 217 X10E3/UL (ref 150–450)
POTASSIUM SERPL-SCNC: 4.9 MMOL/L (ref 3.5–5.2)
PROT SERPL-MCNC: 6.2 G/DL (ref 6–8.5)
QUANTIFERON INCUBATION, QF1T: NORMAL
QUANTIFERON TB2 AG: 0.03 IU/ML
RBC # BLD AUTO: 3.91 X10E6/UL (ref 3.77–5.28)
SERVICE CMNT-IMP: NORMAL
SODIUM SERPL-SCNC: 141 MMOL/L (ref 134–144)
WBC # BLD AUTO: 7 X10E3/UL (ref 3.4–10.8)

## 2021-05-21 DIAGNOSIS — M06.09 SERONEGATIVE RHEUMATOID ARTHRITIS OF MULTIPLE SITES (HCC): ICD-10-CM

## 2021-05-21 RX ORDER — FOLIC ACID 1 MG/1
1 TABLET ORAL DAILY
Qty: 90 TABLET | Refills: 4 | Status: SHIPPED
Start: 2021-05-21 | End: 2021-10-15 | Stop reason: SDUPTHER

## 2021-05-21 RX ORDER — METHOTREXATE 25 MG/ML
25 INJECTION, SOLUTION INTRA-ARTERIAL; INTRAMUSCULAR; INTRAVENOUS
Qty: 12 VIAL | Refills: 0 | Status: SHIPPED | OUTPATIENT
Start: 2021-05-26 | End: 2021-07-27 | Stop reason: ALTCHOICE

## 2021-07-14 DIAGNOSIS — M06.09 SERONEGATIVE RHEUMATOID ARTHRITIS OF MULTIPLE SITES (HCC): ICD-10-CM

## 2021-07-15 RX ORDER — ADALIMUMAB 40MG/0.4ML
40 KIT SUBCUTANEOUS
Qty: 2 KIT | Refills: 11 | Status: SHIPPED | OUTPATIENT
Start: 2021-07-21 | End: 2021-11-05 | Stop reason: SDUPTHER

## 2021-07-22 NOTE — PROGRESS NOTES
[facility] [place] at [address] [facility]  Nehal Passer: [phone]  F: [phone]      Reason for Visit:   Jacqueline Vieyra is a 70 y.o. female who is seen in consultation at the request of Dr. Gema Moran for evaluation of anemia. History of Present Illness:   Jacqueline Vieyra is a pleasant 70 y.o. female who presents today for evaluation of anemia. She follows with Dr. Gema Moran for RA, taking MTX and Humira. She was hospitalized 12/2020 for back surgery. She had mild anemia prior to surgery which worsened after surgery, requiring transfusion. She has been on oral iron BID since January. Tolerating this reasonably well. More recently she was also started on oral and IM B12 therapy. She recalls issues with anemia years ago when trying to donate blood. No workup for this in the past.  She denies significant bleeding. She has been having considerable fatigue lately. She reports colonoscopies in the past, last about 2 years ago. EGD done at that time as well. Review of systems was obtained and pertinent findings reviewed above. Past medical history, social history, family history, medications, and allergies are located in the electronic medical record. Physical Exam:     Visit Vitals  BP (!) 179/82 (BP 1 Location: Left upper arm, BP Patient Position: Sitting, BP Cuff Size: Large adult) Comment: .    Pulse 78   Temp 97 °F (36.1 °C) (Temporal)   Resp 20   Ht 5' 1\" (1.549 m)   Wt 207 lb (93.9 kg)   LMP 11/25/2002   SpO2 98%   BMI 39.11 kg/m²       General: no distress  Eyes: anicteric sclerae  HENT: oropharynx clear  Neck: supple  Lymphatic: no cervical, supraclavicular, or inguinal adenopathy  Respiratory: normal respiratory effort  CV: no peripheral edema  GI: soft, nontender, nondistended, no masses  Skin: no rashes, no ecchymoses, no petechiae    Results:     Lab Results   Component Value Date/Time    WBC 7.0 05/14/2021 04:18 PM    HGB 13.0 05/14/2021 04:18 PM    HCT 38.9 05/14/2021 04:18 PM    PLATELET 280 19/58/8548 04:18 PM     (H) 05/14/2021 04:18 PM    ABS. NEUTROPHILS 4.3 05/14/2021 04:18 PM     Lab Results   Component Value Date/Time    Sodium 141 05/14/2021 04:18 PM    Potassium 4.9 05/14/2021 04:18 PM    Chloride 104 05/14/2021 04:18 PM    CO2 24 05/14/2021 04:18 PM    Glucose 132 (H) 05/14/2021 04:18 PM    BUN 18 05/14/2021 04:18 PM    Creatinine 1.00 05/14/2021 04:18 PM    GFR est AA 65 05/14/2021 04:18 PM    GFR est non-AA 56 (L) 05/14/2021 04:18 PM    Calcium 9.8 05/14/2021 04:18 PM     Lab Results   Component Value Date/Time    Bilirubin, total 0.5 05/14/2021 04:18 PM    ALT (SGPT) 25 05/14/2021 04:18 PM    Alk. phosphatase 182 (H) 05/14/2021 04:18 PM    Protein, total 6.2 05/14/2021 04:18 PM    Albumin 4.2 05/14/2021 04:18 PM    Globulin 3.2 12/19/2020 02:16 AM     Lab Results   Component Value Date/Time    Sed rate (ESR) 5 05/14/2021 04:18 PM    C-Reactive Protein, Qt 2 05/14/2021 04:18 PM    TSH 2.920 11/14/2016 11:19 AM    M-Kar Not Observed 01/21/2019 04:25 PM    HEP C VIRUS AB <0.1 11/25/2015 03:55 PM     Lab Results   Component Value Date/Time    INR 1.0 12/09/2020 12:22 PM    aPTT 24.2 12/09/2020 12:22 PM     HGB (g/dL)   Date Value   05/14/2021 13.0   12/21/2020 7.7 (L)   12/20/2020 7.7 (L)   12/19/2020 7.2 (L)   12/18/2020 6.8 (L)   12/18/2020 6.4 (L)   12/18/2020 6.3 (L)   12/17/2020 7.2 (L)   12/17/2020 6.9 (L)   12/16/2020 7.4 (L)   12/16/2020 6.8 (L)   12/09/2020 10.0 (L)   01/24/2020 10.0 (L)   01/21/2019 10.9 (L)   05/04/2017 12.0       5/16/2021  Ferritin: 69  Folate: >20  B12: 253      Records from Dr. Matthew Patel and from hospitalization reviewed and summarized above. Test results above have been reviewed. Assessment:   1) Anemia, normocytic  Chronic since 1/2019, worsened after back surgery, though resolved on most recent check in 5/2021.   This was most likely due to iron deficiency, along with blood loss from surgery, improved now after oral iron replacement. Labs from May showed a borderline B12 level and new macrocytosis. She is now on B12 supplements from her PCP. Macrocytosis could also be related to the MTX, now uncovered after correcting her iron deficiency. I recommend continuing with oral iron and IM B12 for now and repeating labs in 6 months to look for improvement. 2) Fatigue  Uncertain etiology. No improvement despite correction of her anemia and iron deficiency. Perhaps related to her B12 deficiency or to her RA or medications? Continue treatment of these and monitor.     Plan:     · Continue IM B12 with PCP  · Continue oral iron  · Labs in 6 months: CBC, iron profile, ferritin, B12  · Return to see me in 6 months    Signed By: Fabi Childs MD

## 2021-07-27 ENCOUNTER — OFFICE VISIT (OUTPATIENT)
Dept: ONCOLOGY | Age: 76
End: 2021-07-27
Payer: COMMERCIAL

## 2021-07-27 VITALS
HEART RATE: 78 BPM | HEIGHT: 61 IN | SYSTOLIC BLOOD PRESSURE: 179 MMHG | RESPIRATION RATE: 20 BRPM | BODY MASS INDEX: 39.08 KG/M2 | OXYGEN SATURATION: 98 % | DIASTOLIC BLOOD PRESSURE: 82 MMHG | TEMPERATURE: 97 F | WEIGHT: 207 LBS

## 2021-07-27 DIAGNOSIS — D64.9 ANEMIA, UNSPECIFIED TYPE: Primary | ICD-10-CM

## 2021-07-27 DIAGNOSIS — E61.1 IRON DEFICIENCY: ICD-10-CM

## 2021-07-27 DIAGNOSIS — E53.8 B12 DEFICIENCY: ICD-10-CM

## 2021-07-27 PROCEDURE — G8417 CALC BMI ABV UP PARAM F/U: HCPCS | Performed by: INTERNAL MEDICINE

## 2021-07-27 PROCEDURE — G8399 PT W/DXA RESULTS DOCUMENT: HCPCS | Performed by: INTERNAL MEDICINE

## 2021-07-27 PROCEDURE — 1090F PRES/ABSN URINE INCON ASSESS: CPT | Performed by: INTERNAL MEDICINE

## 2021-07-27 PROCEDURE — 1101F PT FALLS ASSESS-DOCD LE1/YR: CPT | Performed by: INTERNAL MEDICINE

## 2021-07-27 PROCEDURE — G8427 DOCREV CUR MEDS BY ELIG CLIN: HCPCS | Performed by: INTERNAL MEDICINE

## 2021-07-27 PROCEDURE — G8432 DEP SCR NOT DOC, RNG: HCPCS | Performed by: INTERNAL MEDICINE

## 2021-07-27 PROCEDURE — 99204 OFFICE O/P NEW MOD 45 MIN: CPT | Performed by: INTERNAL MEDICINE

## 2021-07-27 PROCEDURE — G8536 NO DOC ELDER MAL SCRN: HCPCS | Performed by: INTERNAL MEDICINE

## 2021-07-27 PROCEDURE — 3017F COLORECTAL CA SCREEN DOC REV: CPT | Performed by: INTERNAL MEDICINE

## 2021-10-15 ENCOUNTER — VIRTUAL VISIT (OUTPATIENT)
Dept: RHEUMATOLOGY | Age: 76
End: 2021-10-15
Payer: COMMERCIAL

## 2021-10-15 DIAGNOSIS — Z79.60 LONG-TERM USE OF IMMUNOSUPPRESSANT MEDICATION: ICD-10-CM

## 2021-10-15 DIAGNOSIS — M06.09 SERONEGATIVE RHEUMATOID ARTHRITIS OF MULTIPLE SITES (HCC): Primary | ICD-10-CM

## 2021-10-15 PROCEDURE — 99215 OFFICE O/P EST HI 40 MIN: CPT | Performed by: INTERNAL MEDICINE

## 2021-10-15 NOTE — PROGRESS NOTES
REASON FOR VISIT    This is a follow-up visit for Ms. Francis for     ICD-10-CM   1. Seronegative rheumatoid arthritis of multiple sites [facility]) M06.09     The patient has consented for synchronous (real-time) Telemedicine (audio-video technology) on 10/15/2021 for their care to be delivered over telemedicine in place of their regularly scheduled office visit pursuant to the emergency declaration under the [address] [place] [address] authority and the [employer] Act, this Virtual  Visit was conducted, with patient's consent, to reduce the patient's risk of exposure to COVID-19 and provide continuity of care for an established patient. Services were provided through a video synchronous discussion virtually to substitute for in-person clinic visit. Inflammatory arthritis phenotype includes:  Anti-CCP positive: no  Rheumatoid factor positive: no  Erosive disease: yes   Extra-articular manifestations include: none    Immunosuppression Screening (5/14/2021):   Quantiferon TB: negative  PPD:  Not performed  Hepatitis B: negative   Hepatitis C: negative      Therapy History includes:  Current DMARD therapy include: methotrexate 25 mg SubQ every Wednesday (1/30/2020 to 12/2020 due to back surgery, 1/2021 to present), Humira 40 mg every Wednesday (7/23/2019 to 12/2020 due to back surgery, 1/2021 to present)  Prior DMARD therapy include: hydroxychloroquine 400 mg daily (1/2016 to 12/2017), hydroxychloroquine 600 mg daily (12/2017 to 1/21/2019; per PCP), methotrexate 20 mg PO every Wednesday (10/30/2019 to 1/24/2020), Humira 40 mg every 14 days (2/2019 to 7/23/2019)  Discontinued DMARDs because of inefficacy: hydroxychloroquine   Discontinued DMARDs because of side effects: None  Relative Contra-Indicated DMARDs because of fatty liver/hepatitis: hydroxychloroquine, sulfasalazine, methotrexate, leflunomide    HISTORY OF PRESENT ILLNESS    Ms. Serge Leach returns for a follow-up. On her last visit, I continued methotrexate 25 mg SubQ every Wednesday and Humira 40 mg every Wednesday, which she has been good tolerance. She notes itching sores on her face that she applies an anti-fungal cream that lasts 3 days. She thinks is from methotrexate. I requested a copy of her DXA. Today, she feels very well. She denies morning pain in her hands but has stiffness in her right hand hand lasting up to an hour. There is no swelling apart from her left 5th PIP and right 2nd PIP. She has right hand stiffness with some difficulty opening jars and occasional pain in her hands with use. Her knees her hurt her less and she has lost 28 lbs. Most recent toxicity monitoring by blood work was done on 5/14/2021 and did not reveal any significant adverse effects, except . I reviewed the patient's interval medical history, surgical history, medications, and allergies. The patient has not had interval hospital admissions, surgeries,. She had two besides of RLW cellulitis in 7/2021 and 9/2021 treated with ABx. REVIEW OF SYSTEMS    A comprehensive review of systems was performed and pertinent results are documented in the HPI, review of systems is otherwise non-contributory. PAST MEDICAL HISTORY    She has a past medical history of Asthma, Chronic sinusitis, Fibromyalgia, GERD (gastroesophageal reflux disease), Hypertension, Leg wound, left (11/1/20 to present), Morbid obesity with BMI of 40.0-44.9, adult ([facility] [address] (12/09/2020), Multiple allergies, Obstructive sleep apnea on CPAP, Osteopenia, and RA (rheumatoid arthritis) ([facility] [address] FAMILY HISTORY    Her family history includes Coronary Artery Disease in her father; Diabetes in her mother. SOCIAL HISTORY    She reports that she has never smoked. She has never used smokeless tobacco. She reports that she does not drink alcohol and does not use drugs.     MEDICATIONS    Current Outpatient Medications   Medication Sig    adalimumab (Humira,CF, Pen) 40 mg/0.4 mL injection pen 0.4 mL by SubCUTAneous route every Wednesday. Indications: rheumatoid arthritis    folic acid (FOLVITE) 1 mg tablet TAKE 1 TABLET DAILY    liraglutide (SAXENDA SC) 0.06 mg by SubCUTAneous route nightly. Self inject    B.infantis-B.ani-B.long-B.bifi (Probiotic 4X) 10-15 mg TbEC Take  by mouth daily.  aspirin delayed-release 81 mg tablet Take  by mouth daily.  olopatadine (PATANASE) 0.6 % spry two (2) times a day.  atorvastatin (LIPITOR) 10 mg tablet Take  by mouth daily.  losartan (COZAAR) 100 mg tablet Take 100 mg by mouth daily.  magnesium oxide (MAG-OX) 400 mg tablet Take 400 mg by mouth daily.  Cholecalciferol, Vitamin D3, 1,000 unit cap Take 1,000 Units by mouth two (2) times a day.  DULERA 200-5 mcg/actuation HFA inhaler Take 2 Puffs by inhalation two (2) times a day.  glucosamine-chondroit-vit c-mn (GLUCOSAMINE 1500 COMPLEX) 500-400 mg capsule Take 2 Caps by mouth nightly.  montelukast (SINGULAIR) 10 mg tablet Take 10 mg by mouth nightly.  esomeprazole (NEXIUM) 40 mg capsule Take  by mouth nightly.  cetirizine (ZYRTEC) 10 mg tablet Take  by mouth daily. No current facility-administered medications for this visit. ALLERGIES    Allergies   Allergen Reactions    Doxycycline Itching    Cottonseed Cough    Lamisil [Terbinafine] Rash    Malt Extract Cough              PHYSICAL EXAMINATION    There were no vitals taken for this visit. There is no height or weight on file to calculate BMI. General: Patient is alert, oriented x 3, not in acute distress    HEENT:   Sclerae are not injected and appear moist.  There is no alopecia. Chest:  Breathing comfortably at room air. Musculoskeletal:  A comprehensive musculoskeletal exam was NOT performed for all joints of each upper and lower extremity and assessed for swelling, tenderness and range of motion.       Previous Exam  Bilateral Armando and Heberden nodes  Bilateral knee crepitus without effusion with patellar laxity and crepitus    Z-Deformities:   no  Riddle Neck Deformities:  no  Boutonierre's Deformities:  no  Ulnar Deviation:   yes  MCP Subluxation:  no    Joint Count 1/24/2020 10/24/2019 7/23/2019 4/23/2019 1/21/2019   Patient pain (0-100) 5 10 5 5 10   MHAQ 0.25 0.5 0.5 0.25 0.5   Left wrist- Tender 1 1 1 - -   Left wrist- Swollen 0 1 0 - -   Left 1st MCP - Tender - 1 1 1 1   Left 1st MCP - Swollen - 1 1 1 1   Left 2nd MCP - Tender 1 0 1 1 1   Left 2nd MCP - Swollen 1 1 1 1 1   Left 3rd MCP - Tender 1 1 1 1 1   Left 3rd MCP - Swollen - 1 1 1 1   Left 4th MCP - Tender - - 1 - -   Left 4th MCP - Swollen - - 0 - -   Left 5th MCP - Tender - - 1 - 1   Left 5th MCP - Swollen - - 1 - 0   Left 2nd PIP - Tender - - 1 1 1   Left 2nd PIP - Swollen - - 1 0 1   Left 3rd PIP - Tender 1 - 1 1 1   Left 3rd PIP - Swollen 1 - 1 1 1   Left 4th PIP - Tender - - 1 - 1   Left 4th PIP - Swollen - - 1 - -   Left 5th PIP - Tender - - 1 - -   Left 5th PIP - Swollen - - 0 - -   Right elbow - Tender - - - - 1   Right elbow - Swollen - - - - 0   Right wrist- Tender - 1 0 1 -   Right wrist- Swollen - 0 1 1 -   Right 1st MCP - Tender 1 1 0 1 1   Right 1st MCP - Swollen 1 1 1 1 1   Right 2nd MCP - Tender - - 0 1 1   Right 2nd MCP - Swollen - - 1 0 1   Right 3rd MCP - Tender 1 1 0 1 1   Right 3rd MCP - Swollen 1 1 1 1 1   Right 4th MCP - Tender 1 1 - 1 1   Right 4th MCP - Swollen 1 0 - 1 1   Right 5th MCP - Tender 0 - 0 1 1   Right 5th MCP - Swollen 1 - 1 1 1   Right 2nd PIP - Tender 0 1 1 1 1   Right 2nd PIP - Swollen 1 1 1 1 1   Right 3rd PIP - Tender 1 1 1 1 1   Right 3rd PIP - Swollen 1 1 1 1 1   Right 4th PIP - Tender 1 1 1 1 1   Right 4th PIP - Swollen 1 1 1 1 1   Right 5th PIP - Tender - - - - 1   Right 5th PIP - Swollen - - - - 1   Tender Joint Count (Total) 9 10 13 14 17   Swollen Joint Count (Total) 9 9 15 12 14   Physician Assessment (0-10) 3 3 5 4 5   Patient Assessment (0-10) 0.5 1 5.5 0.5 2   CDAI Total (calculated) 21.5 23 38.5 30.5 38     DATA REVIEW    Laboratory     Recent laboratory results were reviewed, summarized, and discussed with the patient. Imaging    Musculoskeletal Ultrasound    None    Radiographs    Bilateral Hand 1/21/2019: RIGHT: osteopenia. No acute fracture or dislocation. Radiocarpal and intercarpal joints are age-appropriate. No erosion of intercarpal or CMC joints. First MCP joint space narrowing is increased. There are small marginal osteophytes. No erosion. Anterior subluxation of the third MCP joint and joint space narrowing are increased. There are are increased subtle erosions of the third metacarpal head. Fifth MCP joint erosion is unchanged. Third PIP joint space narrowing is mild and unchanged. The DIP joints are within normal limits. LEFT: osteopenia. No acute fracture or dislocation. Mild anterior subluxation of the third MCP joint is increased. Radiocarpal and intercarpal joints are within normal limits. First CMC joint osteoarthritis is mild and new. Third MCP joint space narrowing and subtle erosions are increased. First MCP joint osteoarthritis is mild and new. IP joints are within normal limits. No periosteal reaction or chondrocalcinosis. Bilateral Foot 1/21/2019: RIGHT: osteopenia. No acute fracture or dislocation. Intertarsal and TMT joints are within normal limits. Hallux valgus angulation measures 45 degrees. Mild first MTP joint osteoarthritis. MTS joint subluxation and arthritis are partially imaged. Remaining MTP joints are within normal limits. IP joints are difficult to visualize given the toe positioning. There are soft tissue calcifications adjacent to the distal tibia. LEFT: osteopenia. No acute fracture or dislocation. Moderate plantar calcaneal spur. Mild talonavicular joint osteoarthritis. Possible third TMT joint osteoarthritis. Hallux valgus angulation measures 40 degrees.  Mild first MTP joint osteoarthritis. MTS joint subluxation and arthritis are partially imaged. Mild third PIP and DIP joint osteoarthritis. No evidence of IP joint erosion. Soft tissue calcifications are adjacent to the distal tibia. No periosteal reaction or chondrocalcinosis. Bilateral Hand 11/25/2015: RIGHT: no acute fracture or dislocation. Alignment is anatomic. Severe joint space narrowing is seen at the third MCP joint. Small erosions are suspected in the head of the third metacarpal. Mild joint space narrowing is seen in the first MCP joint. Generalized osteopenia is noted. Soft tissue swelling surrounds the third MCP joint. LEFT: no acute fracture or dislocation. Alignment is anatomic. Severe joint space is seen at the third  MCP joint mild joint space. Mild joint space narrowing is seen at the first MCP joint. No erosions are seen. Generalized osteopenia is noted. There is the suggestion of mild soft tissue swelling surrounding the third MCP joint. CT Imaging    CT Chest without contrast 1/16/2017: The thoracic inlet is unremarkable. No mediastinal nor hilar masses nor adenopathy. No thoracic aortic aneurysm. The central airways are patent. Minimal hypoventilation is appreciated panel placed. The lungs are otherwise clear. There is diffuse low attenuation within the liver parenchyma. The remaining visualized upper abdominal viscera are unremarkable. There are no aggressive appearing osseous lesions. Degenerative changes within the mid thoracic spine. MR Imaging    None    DXA      DXA 11/01/2016: (excluded L1, right hip, distal radius) lumbar spine L1-L3 T score 1.5 (BMD 1.183 g/cm2), left femoral neck T score: -0.5 (0.880 g/cm2). FRAX score 17 % probability in 10 years for major osteoporotic fracture and 3.7 % 10 year probability of hip fracture. ASSESSMENT AND PLAN    This is a follow-up visit for Ms. Francis. 1) Seronegative Erosive Rheumatoid Arthritis.  She is maintained on methotrexate 25 mg every Wednesday and Humira 40 mg every Wednesday with good tolerance. She feels SubQ is causing her skin rash on her face that lasts several days. She has inflammatory symptoms in her right hand described as stiffness. Her CDAI was previously 21.5 (previously 23, 38.5, 30.5, 38) with 9 tender and 9 swollen joints, consistent with high disease activity. I will change SubQ methotrexate to PO and asked her to let me know if that resolves her rash, but asked her to skip next week's dose to make sure Humira is not causing the rash, which she takes the same day, and to also get her COVID booster vaccine. 2) Long Term Use of Immunosuppressants. The patient remains on high risk immunomodulatory medications (methotrexate, Humira) and requires frequent toxicity monitoring by blood work to evaluate for toxicities. Respective labs were ordered (see below orders for details). 3) Bilateral Patellofemoral Arthritis. This was not an active issue. I had referred her to physical therapy, which helped. 4) Bilateral Knee Osteoarthritis. This was not an active issue. 5) Bilateral Hand Osteoarthritis. This was not an active issue. 6) Chronic Back Pain. She follows with Dr. Kevon Mccrary and is s/p surgery and feels well. 7) Long Term Use of Bisphosphonates. She reports taking Fosamax for around 5 years or more. Inocencio Foxer al reported that the risk of atypical femoral fractures is low, with an incidence of up to 50 per 100,000 person-years during the first 5 years of bisphosphonate use, resulting in a clear positive benefit/risk ratio within this time frame (J Bone  Res. 2016 Jan;31(1):16-35). I asked her to send me her most recent DXA with Dr. Nate Alvarez. 8) Severe Anemia. This resolved. The patient voiced understanding of the aforementioned assessment and plan.      The patient has consented for synchronous (real-time) Telemedicine (audio-video technology) on 10/15/2021 for their care to be delivered over telemedicine in place of their regularly scheduled office visit pursuant to the emergency declaration under the [address] waiver authority and the [employer] Act, this Virtual  Visit was conducted, with patient's consent, to reduce the patient's risk of exposure to COVID-19 and provide continuity of care for an established patient. A total of 43 minutes was spent on this visit, reviewing interval notes, interval testing results, ordering tests, refilling medications, documenting the findings in the note, patient education, counseling, and coordination of care as described above. All questions asked and answered. Services were provided through a video synchronous discussion virtually to substitute for in-person clinic visit.     TODAY'S ORDERS    Orders Placed This Encounter    ADALIMUMAB+AB    CBC WITH AUTOMATED DIFF    METABOLIC PANEL, COMPREHENSIVE    METHOTREXATE POLYGLUTAMATES     Future Appointments   Date Time Provider Dionisio Reny   1/26/2022 10:00 AM Marisol Cantu MD [facility] BS AMB   1/27/2022 10:30 AM Martha Medrano MD [facility] BS AMB      Elsie Bone MD, [address] [facility] [address]    Adult Rheumatology   Rheumatology Ultrasound Certified  [facility]  A Part of [facility], [address] [place] [address] Road   Phone [phone]  Fax [phone]

## 2021-10-29 LAB
ADALIMUMAB DRUG LEVEL, 503866: 15 UG/ML
ALBUMIN SERPL-MCNC: 4 G/DL (ref 3.7–4.7)
ALBUMIN/GLOB SERPL: 1.7 {RATIO} (ref 1.2–2.2)
ALP SERPL-CCNC: 148 IU/L (ref 44–121)
ALT SERPL-CCNC: 22 IU/L (ref 0–32)
ANTI-ADALIMUMAB ANTIBODY, 503867: <25 NG/ML
AST SERPL-CCNC: 17 IU/L (ref 0–40)
BASOPHILS # BLD AUTO: 0 X10E3/UL (ref 0–0.2)
BASOPHILS NFR BLD AUTO: 0 %
BILIRUB SERPL-MCNC: 0.6 MG/DL (ref 0–1.2)
BUN SERPL-MCNC: 14 MG/DL (ref 8–27)
BUN/CREAT SERPL: 20 (ref 12–28)
CALCIUM SERPL-MCNC: 9.9 MG/DL (ref 8.7–10.3)
CHLORIDE SERPL-SCNC: 104 MMOL/L (ref 96–106)
CO2 SERPL-SCNC: 25 MMOL/L (ref 20–29)
CREAT SERPL-MCNC: 0.7 MG/DL (ref 0.57–1)
EOSINOPHIL # BLD AUTO: 0.3 X10E3/UL (ref 0–0.4)
EOSINOPHIL NFR BLD AUTO: 4 %
ERYTHROCYTE [DISTWIDTH] IN BLOOD BY AUTOMATED COUNT: 12.3 % (ref 11.7–15.4)
GLOBULIN SER CALC-MCNC: 2.3 G/DL (ref 1.5–4.5)
GLUCOSE SERPL-MCNC: 123 MG/DL (ref 65–99)
HCT VFR BLD AUTO: 41.4 % (ref 34–46.6)
HGB BLD-MCNC: 14.3 G/DL (ref 11.1–15.9)
IMM GRANULOCYTES # BLD AUTO: 0 X10E3/UL (ref 0–0.1)
IMM GRANULOCYTES NFR BLD AUTO: 0 %
LYMPHOCYTES # BLD AUTO: 1.8 X10E3/UL (ref 0.7–3.1)
LYMPHOCYTES NFR BLD AUTO: 27 %
MCH RBC QN AUTO: 35.8 PG (ref 26.6–33)
MCHC RBC AUTO-ENTMCNC: 34.5 G/DL (ref 31.5–35.7)
MCV RBC AUTO: 104 FL (ref 79–97)
METHOTREXATE PG1: 42 NMOL/L RBC
METHOTREXATE PG2: 22 NMOL/L RBC
METHOTREXATE PG3: 93 NMOL/L RBC
METHOTREXATE PG4: 66 NMOL/L RBC
METHOTREXATE PG5: 31 NMOL/L RBC
METHOTREXATE-PG TOTAL: 253 NMOL/L RBC
MONOCYTES # BLD AUTO: 0.6 X10E3/UL (ref 0.1–0.9)
MONOCYTES NFR BLD AUTO: 9 %
MTXPGSRA INTERPRETATION: NORMAL
NEUTROPHILS # BLD AUTO: 4 X10E3/UL (ref 1.4–7)
NEUTROPHILS NFR BLD AUTO: 60 %
PLATELET # BLD AUTO: 193 X10E3/UL (ref 150–450)
POTASSIUM SERPL-SCNC: 5.1 MMOL/L (ref 3.5–5.2)
PROT SERPL-MCNC: 6.3 G/DL (ref 6–8.5)
RBC # BLD AUTO: 4 X10E6/UL (ref 3.77–5.28)
SODIUM SERPL-SCNC: 142 MMOL/L (ref 134–144)
WBC # BLD AUTO: 6.8 X10E3/UL (ref 3.4–10.8)

## 2021-11-05 DIAGNOSIS — M06.09 SERONEGATIVE RHEUMATOID ARTHRITIS OF MULTIPLE SITES (HCC): ICD-10-CM

## 2021-11-05 RX ORDER — METHOTREXATE 2.5 MG/1
20 TABLET ORAL
Qty: 96 TABLET | Refills: 1 | Status: SHIPPED | OUTPATIENT
Start: 2021-11-10 | End: 2022-01-28 | Stop reason: SDUPTHER

## 2021-11-05 RX ORDER — ADALIMUMAB 40MG/0.4ML
40 KIT SUBCUTANEOUS
Qty: 2 KIT | Refills: 11 | Status: SHIPPED | OUTPATIENT
Start: 2021-11-10 | End: 2021-11-05 | Stop reason: SDUPTHER

## 2021-11-05 RX ORDER — ADALIMUMAB 40MG/0.4ML
40 KIT SUBCUTANEOUS
Qty: 2 KIT | Refills: 11 | Status: SHIPPED | OUTPATIENT
Start: 2021-11-10 | End: 2021-12-10 | Stop reason: SDUPTHER

## 2021-11-09 ENCOUNTER — TELEPHONE (OUTPATIENT)
Dept: RHEUMATOLOGY | Age: 76
End: 2021-11-09

## 2021-11-09 NOTE — TELEPHONE ENCOUNTER
Spoke with pt regarding a rash that she has on her face that come and goes. She stated that it is mostly itchy but a little painful. She uses clobetasol cream on it and it gets better but keep coming back. It does not correlate to when she gets her Humira or her methotrexate injections and she has had this for over 6 months. I advised her to check with a dermatologist to see if they can help because we are not sure what it is from. She stated that she thinks she used to see a dermatologist in the group that we would refer her to so she is going to call them and see if she can make an appt and if she needs a referral from us she will call us back.

## 2021-12-10 ENCOUNTER — DOCUMENTATION ONLY (OUTPATIENT)
Dept: PHARMACY | Age: 76
End: 2021-12-10

## 2021-12-10 DIAGNOSIS — M06.09 SERONEGATIVE RHEUMATOID ARTHRITIS OF MULTIPLE SITES (HCC): ICD-10-CM

## 2021-12-10 RX ORDER — ADALIMUMAB 40MG/0.4ML
40 KIT SUBCUTANEOUS
Qty: 2 KIT | Refills: 11 | Status: SHIPPED | OUTPATIENT
Start: 2021-12-15

## 2021-12-10 NOTE — PROGRESS NOTES
[facility] at [address] [facility] Update    Date: 12/10/21    Anamaria Can 1945    Medication: Humira Pen 40 mg/0.4 ml    Prior Authorization: through 1/7/22    Prescription needs to be transferred to [address] (phone: [phone]). [facility] ([phone]) was notified that this specialty prescription needs to be transferred to the insurance mandated specialty pharmacy above.      Nubia Mckeon, [address] at [facility], [address] [place] [address], [address]  phone: [phone]   fax: [phone]

## 2022-01-17 ENCOUNTER — TELEPHONE (OUTPATIENT)
Dept: ONCOLOGY | Age: 77
End: 2022-01-17

## 2022-01-17 NOTE — TELEPHONE ENCOUNTER
[facility] [employer] at [facility]  [phone]    01/17/22- Phone call placed to pt to remind pt to have labs drawn prior to her follow up appointment with . Pt verbalized understanding.

## 2022-01-18 NOTE — PROGRESS NOTES
[facility] [place] [facility] [place] [address] [facility] [address] [facility] [place]: [phone]  F: [phone]      Reason for Visit:   Elina Huffman is a 78 y.o. female who is seen for follow up of anemia. History of Present Illness:   She remains on B12 PO and IM. She has not noticed any improvement in her energy. She is working on weight loss, lost 43 pounds since 7/2021. Exercising BIW in a chair fitness class. Continues with problems related to he RA, on MTX and Humira. She is taking oral iron BID. Lots of gas from this, no constipation. No bleeding. She reports colonoscopies in the past, last about 2 years ago. EGD done at that time as well. Review of systems was obtained and pertinent findings reviewed above. Past medical history, social history, family history, medications, and allergies are located in the electronic medical record. Physical Exam:     Visit Vitals  BP (!) 150/76 (BP 1 Location: Left upper arm, BP Patient Position: Sitting, BP Cuff Size: Large adult) Comment: . Pulse 73   Temp 97.2 °F (36.2 °C) (Temporal)   Resp 16   LMP 11/25/2002   SpO2 98%     General: no distress  Respiratory: normal respiratory effort  CV: no peripheral edema  Skin: no rashes; no ecchymoses; no petechiae      Results:     Lab Results   Component Value Date/Time    WBC 5.7 01/19/2022 09:48 AM    HGB 13.8 01/19/2022 09:48 AM    HCT 39.1 01/19/2022 09:48 AM    PLATELET 703 80/64/7267 09:48 AM     (H) 01/19/2022 09:48 AM    ABS.  NEUTROPHILS 2.7 01/19/2022 09:48 AM     Lab Results   Component Value Date/Time    Sodium 142 10/21/2021 10:19 AM    Potassium 5.1 10/21/2021 10:19 AM    Chloride 104 10/21/2021 10:19 AM    CO2 25 10/21/2021 10:19 AM    Glucose 123 (H) 10/21/2021 10:19 AM    BUN 14 10/21/2021 10:19 AM    Creatinine 0.70 10/21/2021 10:19 AM    GFR est AA 99 10/21/2021 10:19 AM    GFR est non-AA 86 10/21/2021 10:19 AM    Calcium 9.9 10/21/2021 10:19 AM Lab Results   Component Value Date/Time    Bilirubin, total 0.6 10/21/2021 10:19 AM    ALT (SGPT) 22 10/21/2021 10:19 AM    Alk. phosphatase 148 (H) 10/21/2021 10:19 AM    Protein, total 6.3 10/21/2021 10:19 AM    Albumin 4.0 10/21/2021 10:19 AM    Globulin 3.2 12/19/2020 02:16 AM     Lab Results   Component Value Date/Time    Iron % saturation 31 01/19/2022 09:48 AM    TIBC 284 01/19/2022 09:48 AM    Ferritin 219 (H) 01/19/2022 09:48 AM    Vitamin B12 >2000 (H) 01/19/2022 09:48 AM    Sed rate (ESR) 5 05/14/2021 04:18 PM    C-Reactive Protein, Qt 2 05/14/2021 04:18 PM    TSH 2.920 11/14/2016 11:19 AM    M-Kar Not Observed 01/21/2019 04:25 PM    HEP C VIRUS AB <0.1 11/25/2015 03:55 PM     Lab Results   Component Value Date/Time    INR 1.0 12/09/2020 12:22 PM    aPTT 24.2 12/09/2020 12:22 PM     HGB (g/dL)   Date Value   01/19/2022 13.8   10/21/2021 14.3   05/14/2021 13.0   12/21/2020 7.7 (L)   12/20/2020 7.7 (L)   12/19/2020 7.2 (L)   12/18/2020 6.8 (L)   12/18/2020 6.4 (L)   12/18/2020 6.3 (L)   12/17/2020 7.2 (L)   12/17/2020 6.9 (L)   12/16/2020 7.4 (L)   12/16/2020 6.8 (L)   12/09/2020 10.0 (L)   01/24/2020 10.0 (L)   01/21/2019 10.9 (L)   05/04/2017 12.0       5/16/2021  Ferritin: 69  Folate: >20  B12: 253        Assessment:   1) Anemia  Chronic since 1/2019, worsened after back surgery, though resolved on most recent checks in 5/2021 and 10/2021. This was most likely due to iron deficiency, along with blood loss from surgery, improved now after oral iron replacement. Her HGB remains normal now at 13.8. No recurrent iron deficiency. She can stop her oral iron at this time. Repeat labs in a year. If stable at that time off iron, I will release her to her PCP. 2) Macrocytosis  Persists despitee B12 IM supplementation from her PCP. Likely due to her MTX, now uncovered after correcting her iron deficiency. MCV improved somewhat on recheck. Monitor. 3) Fatigue  Uncertain etiology.  No improvement despite correction of her anemia and iron deficiency. Perhaps related to her B12 deficiency or to her RA or medications? Continue treatment of these and monitor. 4) B12 deficiency  Level is now >2000. She would like to try stopping her B12 IM therapy and continue on PO alone. Repeat labs in 1 year.     Plan:     · Labs in 1 year: CBC, iron profile, ferritin, B12  · Return to see me in 1 year        Signed By: Thiago Andrade MD

## 2022-01-20 LAB
BASOPHILS # BLD AUTO: 0 X10E3/UL (ref 0–0.2)
BASOPHILS NFR BLD AUTO: 1 %
EOSINOPHIL # BLD AUTO: 0.3 X10E3/UL (ref 0–0.4)
EOSINOPHIL NFR BLD AUTO: 6 %
ERYTHROCYTE [DISTWIDTH] IN BLOOD BY AUTOMATED COUNT: 12.5 % (ref 11.7–15.4)
FERRITIN SERPL-MCNC: 219 NG/ML (ref 15–150)
HCT VFR BLD AUTO: 39.1 % (ref 34–46.6)
HGB BLD-MCNC: 13.8 G/DL (ref 11.1–15.9)
IMM GRANULOCYTES # BLD AUTO: 0 X10E3/UL (ref 0–0.1)
IMM GRANULOCYTES NFR BLD AUTO: 0 %
IRON SATN MFR SERPL: 31 % (ref 15–55)
IRON SERPL-MCNC: 88 UG/DL (ref 27–139)
LYMPHOCYTES # BLD AUTO: 2 X10E3/UL (ref 0.7–3.1)
LYMPHOCYTES NFR BLD AUTO: 35 %
MCH RBC QN AUTO: 35.3 PG (ref 26.6–33)
MCHC RBC AUTO-ENTMCNC: 35.3 G/DL (ref 31.5–35.7)
MCV RBC AUTO: 100 FL (ref 79–97)
MONOCYTES # BLD AUTO: 0.7 X10E3/UL (ref 0.1–0.9)
MONOCYTES NFR BLD AUTO: 12 %
NEUTROPHILS # BLD AUTO: 2.7 X10E3/UL (ref 1.4–7)
NEUTROPHILS NFR BLD AUTO: 46 %
PLATELET # BLD AUTO: 182 X10E3/UL (ref 150–450)
RBC # BLD AUTO: 3.91 X10E6/UL (ref 3.77–5.28)
TIBC SERPL-MCNC: 284 UG/DL (ref 250–450)
UIBC SERPL-MCNC: 196 UG/DL (ref 118–369)
VIT B12 SERPL-MCNC: >2000 PG/ML (ref 232–1245)
WBC # BLD AUTO: 5.7 X10E3/UL (ref 3.4–10.8)

## 2022-01-27 ENCOUNTER — OFFICE VISIT (OUTPATIENT)
Dept: ONCOLOGY | Age: 77
End: 2022-01-27
Payer: COMMERCIAL

## 2022-01-27 VITALS
OXYGEN SATURATION: 98 % | RESPIRATION RATE: 16 BRPM | HEART RATE: 73 BPM | TEMPERATURE: 97.2 F | SYSTOLIC BLOOD PRESSURE: 150 MMHG | DIASTOLIC BLOOD PRESSURE: 76 MMHG

## 2022-01-27 DIAGNOSIS — E53.8 B12 DEFICIENCY: ICD-10-CM

## 2022-01-27 DIAGNOSIS — D64.9 ANEMIA, UNSPECIFIED TYPE: Primary | ICD-10-CM

## 2022-01-27 DIAGNOSIS — E61.1 IRON DEFICIENCY: ICD-10-CM

## 2022-01-27 PROCEDURE — 99214 OFFICE O/P EST MOD 30 MIN: CPT | Performed by: INTERNAL MEDICINE

## 2022-01-27 RX ORDER — UREA 10 %
100 LOTION (ML) TOPICAL DAILY
COMMUNITY

## 2022-01-27 NOTE — PROGRESS NOTES
Katlyn Hardin is a 78 y.o. female follow up for anemia. 1. Have you been to the ER, urgent care clinic since your last visit? Hospitalized since your last visit?no     2. Have you seen or consulted any other health care providers outside of the [address] [place] [address] since your last visit? Include any pap smears or colon screening.  no

## 2022-01-28 ENCOUNTER — HOSPITAL ENCOUNTER (OUTPATIENT)
Dept: GENERAL RADIOLOGY | Age: 77
Discharge: HOME | End: 2022-01-28
Payer: COMMERCIAL

## 2022-01-28 ENCOUNTER — OFFICE VISIT (OUTPATIENT)
Dept: RHEUMATOLOGY | Age: 77
End: 2022-01-28
Payer: COMMERCIAL

## 2022-01-28 VITALS
OXYGEN SATURATION: 98 % | HEIGHT: 61 IN | DIASTOLIC BLOOD PRESSURE: 83 MMHG | HEART RATE: 84 BPM | BODY MASS INDEX: 34.48 KG/M2 | SYSTOLIC BLOOD PRESSURE: 136 MMHG | TEMPERATURE: 97.9 F | WEIGHT: 182.6 LBS | RESPIRATION RATE: 18 BRPM

## 2022-01-28 DIAGNOSIS — M06.09 SERONEGATIVE RHEUMATOID ARTHRITIS OF MULTIPLE SITES (HCC): Primary | ICD-10-CM

## 2022-01-28 DIAGNOSIS — R74.8 ELEVATED ALKALINE PHOSPHATASE LEVEL: ICD-10-CM

## 2022-01-28 DIAGNOSIS — M06.09 SERONEGATIVE RHEUMATOID ARTHRITIS OF MULTIPLE SITES (HCC): ICD-10-CM

## 2022-01-28 DIAGNOSIS — Z79.60 LONG-TERM USE OF IMMUNOSUPPRESSANT MEDICATION: ICD-10-CM

## 2022-01-28 PROCEDURE — 73130 X-RAY EXAM OF HAND: CPT

## 2022-01-28 PROCEDURE — 99215 OFFICE O/P EST HI 40 MIN: CPT | Performed by: INTERNAL MEDICINE

## 2022-01-28 RX ORDER — HYDROXYCHLOROQUINE SULFATE 200 MG/1
400 TABLET, FILM COATED ORAL DAILY
Qty: 180 TABLET | Refills: 0 | Status: SHIPPED | OUTPATIENT
Start: 2022-01-28 | End: 2022-05-11 | Stop reason: SDUPTHER

## 2022-01-28 RX ORDER — FOLIC ACID 1 MG/1
TABLET ORAL
Qty: 90 TABLET | Refills: 5 | Status: SHIPPED | OUTPATIENT
Start: 2022-01-28

## 2022-01-28 RX ORDER — METHOTREXATE 2.5 MG/1
20 TABLET ORAL
Qty: 96 TABLET | Refills: 0 | Status: SHIPPED | OUTPATIENT
Start: 2022-02-02 | End: 2022-07-21 | Stop reason: SDUPTHER

## 2022-01-28 NOTE — PROGRESS NOTES
Chief Complaint   Patient presents with    Arthritis     hand, elbow     1. Have you been to the ER, urgent care clinic since your last visit? Hospitalized since your last visit? Yes Where: [facility] urgent care    2. Have you seen or consulted any other health care providers outside of the [address] [place] [address] since your last visit? Include any pap smears or colon screening.  Yes Where: PCP

## 2022-01-28 NOTE — PATIENT INSTRUCTIONS
1. For now, let's try adding Plaquenil (hydroxychloroquine). Start Plaquenil (hydroxychloroquine), 400mg/day. I'd recommend starting with 1 pill twice a day with food, and if that doesn't upset your stomach then you can consolidate to 2 pills once a day. Remember to check in with your ophthalmologist for a baseline eye exam--there is a 1 in 5000 chance this could affect your vision within the first five years of use, though as long as you see ophthalmology at least annually they should be able to  on any changes before you would notice them in your vision. 2. Continue methotrexate 8 pills once a week, folic acid daily, and Humira weekly. 3. Labs ASAP    4. Xrays, any [facility] location would be OK, such as those associated with the [facility] ([address] [place] [address], [address]). . you don't need an appointment. 5. Return in 3 months. Call if your joints worsen in the meantime.

## 2022-01-28 NOTE — PROGRESS NOTES
REASON FOR VISIT    This is an in-office new Rheumatology provider follow-up visit for Ms. Francis for     ICD-10-CM   1. Seronegative rheumatoid arthritis of multiple sites ([facility] M06.09               Inflammatory arthritis phenotype includes:  Anti-CCP positive: no (1/21/19)  Rheumatoid factor positive: no (1/21/19)  Other serologies: 9/3/15 direct NEMESIO RNP+, NEMESIO IFA negative  Erosive disease: yes   Extra-articular manifestations include: none    Immunosuppression Screening (5/14/2021): Quantiferon TB: negative 5/14/21  PPD:  Not performed  Hepatitis B: negative   Hepatitis C: negative      Therapy History includes:  Current DMARD therapy include: methotrexate 25 mg SubQ every Wednesday (1/30/2020 to 12/2020 due to back surgery, 1/2021 to present), Humira 40 mg every Wednesday (7/23/2019 to 12/2020 due to back surgery, 1/2021 to present)  Prior DMARD therapy include: hydroxychloroquine 400 mg daily (1/2016 to 12/2017), hydroxychloroquine 600 mg daily (12/2017 to 1/21/2019; per PCP), methotrexate 20 mg PO every Wednesday (10/30/2019 to 1/24/2020), Humira 40 mg every 14 days (2/2019 to 7/23/2019)  Discontinued DMARDs because of inefficacy: hydroxychloroquine   Discontinued DMARDs because of side effects: None  Relative Contra-Indicated DMARDs because of fatty liver/hepatitis: hydroxychloroquine, sulfasalazine, methotrexate, leflunomide    HISTORY OF PRESENT ILLNESS    Ms. Ynes Tadeo returns for a follow-up. Once switched from methotrexate subQ to PO, her small pruritic facial papules have resolved. Hands stay at a 2/10 in pain severity. Wears copper fit gloves. Both elbows can be intermittently painful, also hips. Doesn't usually last longer than a few hours. Takes a rare ibuprofen for this though isn't sure it is making a major difference. With weight loss, knee pains have resolved. . has lost 42 pounds with intermittent fasting, Saxenda, and \"chair fitness\".     Has had problems with iron and B12 deficiencies, now repleted with Dr. Diane Hurst. Fatigue has persisted through this. Taking daytime naps. No relation with timing of methotrexate. REVIEW OF SYSTEMS    A comprehensive review of systems was performed and pertinent results are documented in the HPI, review of systems is otherwise non-contributory. PAST MEDICAL HISTORY    She has a past medical history of Asthma, Chronic sinusitis, Fibromyalgia, GERD (gastroesophageal reflux disease), Hypertension, Leg wound, left (11/1/20 to present), Morbid obesity with BMI of 40.0-44.9, adult ([facility] (12/09/2020), Multiple allergies, Obstructive sleep apnea on CPAP, Osteopenia, and RA (rheumatoid arthritis) ([facility] FAMILY HISTORY    Her family history includes Coronary Art Dis in her father; Diabetes in her mother. SOCIAL HISTORY    She reports that she has never smoked. She has never used smokeless tobacco. She reports that she does not drink alcohol and does not use drugs. MEDICATIONS    Current Outpatient Medications   Medication Sig    cyanocobalamin (VITAMIN B12) 100 mcg tablet Take 100 mcg by mouth daily.  adalimumab (Humira,CF, Pen) 40 mg/0.4 mL injection pen 0.4 mL by SubCUTAneous route every Wednesday. Indications: rheumatoid arthritis    methotrexate (RHEUMATREX) 2.5 mg tablet Take 8 Tablets by mouth every Wednesday.  folic acid (FOLVITE) 1 mg tablet TAKE 1 TABLET DAILY    liraglutide (SAXENDA SC) 0.06 mg by SubCUTAneous route nightly. Self inject    B.infantis-B.ani-B.long-B.bifi (Probiotic 4X) 10-15 mg TbEC Take  by mouth daily.  aspirin delayed-release 81 mg tablet Take  by mouth daily.  olopatadine (PATANASE) 0.6 % spry two (2) times a day.  atorvastatin (LIPITOR) 10 mg tablet Take  by mouth daily.  losartan (COZAAR) 100 mg tablet Take 100 mg by mouth daily.  magnesium oxide (MAG-OX) 400 mg tablet Take 400 mg by mouth daily.  Cholecalciferol, Vitamin D3, 1,000 unit cap Take 1,000 Units by mouth two (2) times a day.    Javed Rendon 200-5 mcg/actuation HFA inhaler Take 2 Puffs by inhalation two (2) times a day.  glucosamine-chondroit-vit c-mn (GLUCOSAMINE 1500 COMPLEX) 500-400 mg capsule Take 2 Caps by mouth nightly.  montelukast (SINGULAIR) 10 mg tablet Take 10 mg by mouth nightly.  esomeprazole (NEXIUM) 40 mg capsule Take  by mouth nightly.  cetirizine (ZYRTEC) 10 mg tablet Take  by mouth daily. No current facility-administered medications for this visit. ALLERGIES    Allergies   Allergen Reactions    Doxycycline Itching    Cottonseed Cough    Lamisil [Terbinafine] Rash    Malt Extract Cough              PHYSICAL EXAMINATION    Visit Vitals  /83 (BP 1 Location: Left upper arm, BP Patient Position: Sitting)   Pulse 84   Temp 97.9 °F (36.6 °C) (Oral)   Resp 18   Ht 5' 1\" (1.549 m)   Wt 182 lb 9.6 oz (82.8 kg)   SpO2 98%   BMI 34.50 kg/m²     Body mass index is 34.5 kg/m². General:  The patient is well developed, well nourished, alert, and in no apparent distress. Eyes: Sclera are anicteric. No conjunctival injection. HEENT:  Oropharynx is clear. No oral ulcers. Adequate salivary pooling. No cervical or supraclavicular lymphadenopathy. Lungs:  Clear to auscultation bilaterally, without wheeze or stridor. Normal respiratory effort. Cor:  Regular rate and rhythm. No murmur rub or gallop. Abdomen: Soft, non-tender, without hepatomegaly or masses. Extremities: No calf tenderness, trace B LE edema. Warm and well perfused. Skin:  No significant abnormalities. Neuro: Nonfocal  Musculoskeletal:    A comprehensive musculoskeletal exam was performed for all joints of each upper and lower extremity and assessed for swelling, tenderness and range of motion. Results are documented as below:  No evidence of synovitis in the small joints of the hands, wrists, shoulders, elbows, hips, knees or ankles.    Synovitis and tenderness of right MCP2  Synovial prominence bilateral MCP2-3  Ulnar deviation of MCPs  Bilateral knee crepitus without warmth or swelling    DATA REVIEW    Laboratory     Recent laboratory results were reviewed, summarized, and discussed with the patient. Imaging    Musculoskeletal Ultrasound    None    Radiographs    Bilateral Hand 1/21/2019: RIGHT: osteopenia. No acute fracture or dislocation. Radiocarpal and intercarpal joints are age-appropriate. No erosion of intercarpal or CMC joints. First MCP joint space narrowing is increased. There are small marginal osteophytes. No erosion. Anterior subluxation of the third MCP joint and joint space narrowing are increased. There are are increased subtle erosions of the third metacarpal head. Fifth MCP joint erosion is unchanged. Third PIP joint space narrowing is mild and unchanged. The DIP joints are within normal limits. LEFT: osteopenia. No acute fracture or dislocation. Mild anterior subluxation of the third MCP joint is increased. Radiocarpal and intercarpal joints are within normal limits. First CMC joint osteoarthritis is mild and new. Third MCP joint space narrowing and subtle erosions are increased. First MCP joint osteoarthritis is mild and new. IP joints are within normal limits. No periosteal reaction or chondrocalcinosis. Bilateral Foot 1/21/2019: RIGHT: osteopenia. No acute fracture or dislocation. Intertarsal and TMT joints are within normal limits. Hallux valgus angulation measures 45 degrees. Mild first MTP joint osteoarthritis. MTS joint subluxation and arthritis are partially imaged. Remaining MTP joints are within normal limits. IP joints are difficult to visualize given the toe positioning. There are soft tissue calcifications adjacent to the distal tibia. LEFT: osteopenia. No acute fracture or dislocation. Moderate plantar calcaneal spur. Mild talonavicular joint osteoarthritis. Possible third TMT joint osteoarthritis. Hallux valgus angulation measures 40 degrees. Mild first MTP joint osteoarthritis.  MTS joint subluxation and arthritis are partially imaged. Mild third PIP and DIP joint osteoarthritis. No evidence of IP joint erosion. Soft tissue calcifications are adjacent to the distal tibia. No periosteal reaction or chondrocalcinosis. Bilateral Hand 11/25/2015: RIGHT: no acute fracture or dislocation. Alignment is anatomic. Severe joint space narrowing is seen at the third MCP joint. Small erosions are suspected in the head of the third metacarpal. Mild joint space narrowing is seen in the first MCP joint. Generalized osteopenia is noted. Soft tissue swelling surrounds the third MCP joint. LEFT: no acute fracture or dislocation. Alignment is anatomic. Severe joint space is seen at the third  MCP joint mild joint space. Mild joint space narrowing is seen at the first MCP joint. No erosions are seen. Generalized osteopenia is noted. There is the suggestion of mild soft tissue swelling surrounding the third MCP joint. CT Imaging    CT Chest without contrast 1/16/2017: The thoracic inlet is unremarkable. No mediastinal nor hilar masses nor adenopathy. No thoracic aortic aneurysm. The central airways are patent. Minimal hypoventilation is appreciated panel placed. The lungs are otherwise clear. There is diffuse low attenuation within the liver parenchyma. The remaining visualized upper abdominal viscera are unremarkable. There are no aggressive appearing osseous lesions. Degenerative changes within the mid thoracic spine. MR Imaging    None    DXA      DXA 11/01/2016: (excluded L1, right hip, distal radius) lumbar spine L1-L3 T score 1.5 (BMD 1.183 g/cm2), left femoral neck T score: -0.5 (0.880 g/cm2). FRAX score 17 % probability in 10 years for major osteoporotic fracture and 3.7 % 10 year probability of hip fracture. ASSESSMENT AND PLAN    This is a follow-up visit for Ms. Francis for seronegative rheumatoid arthritis, with mild breakthrough synovitis in the index MCP today.  Reviewed alternatives, will start with adding Plaquenil (hydroxychloroquine) to her current regimen of methotrexate and Humira. 1) Seronegative Erosive Rheumatoid Arthritis. -Starting Plaquenil (hydroxychloroquine)   -Cont methotrexate 20mg weekly  -Cont Humira weekly    2) Long Term Use of Immunosuppressants.   -Overdue for labs, reordered  -Reviewed need for at least annual ophthalmologic exams. 3)  Elevated alkaline phosphatase level  - GGT; Future    Patient Instructions   1. For now, let's try adding Plaquenil (hydroxychloroquine). Start Plaquenil (hydroxychloroquine), 400mg/day. I'd recommend starting with 1 pill twice a day with food, and if that doesn't upset your stomach then you can consolidate to 2 pills once a day. Remember to check in with your ophthalmologist for a baseline eye exam--there is a 1 in 5000 chance this could affect your vision within the first five years of use, though as long as you see ophthalmology at least annually they should be able to  on any changes before you would notice them in your vision. 2. Continue methotrexate 8 pills once a week, folic acid daily, and Humira weekly. 3. Labs ASAP    4. Xrays, any [facility] location would be OK, such as those associated with the [facility] ([address], [facility]). . you don't need an appointment. 5. Return in 3 months. Call if your joints worsen in the meantime. Face to face time: 30 minutes  Note preparation and records review day of service: 20 minutes  Total provider time day of service: 50 minutes      The patient voiced understanding of the aforementioned assessment and plan.        TODAY'S ORDERS    Orders Placed This Encounter    XR HAND RT MIN 3 V    XR HAND LT MIN 3 V    C REACTIVE PROTEIN, QT    METABOLIC PANEL, COMPREHENSIVE    SED RATE (ESR)    ANTI-NUCLEAR AB BY IFA (RDL)    GGT    MAGNESIUM    RO (SS-A) AB    methotrexate (RHEUMATREX) 2.5 mg tablet    folic acid (FOLVITE) 1 mg tablet    hydrOXYchloroQUINE (PLAQUENIL) 200 mg tablet     Future Appointments   Date Time Provider Dionisio Oglesby   4/28/2022 11:00 AM Sandra Crowe MD [facility] BS AMB   1/27/2023 10:30 AM Cedrick Medrano MD [facility] BS AMB      Scar Davidson MD    Adult Rheumatology   [facility]  A [facility] of [facility], [address] [place] [address]   Phone [phone]  Fax [phone]

## 2022-02-04 LAB
ALBUMIN SERPL-MCNC: 4.3 G/DL (ref 3.7–4.7)
ALBUMIN/GLOB SERPL: 1.9 {RATIO} (ref 1.2–2.2)
ALP SERPL-CCNC: 158 IU/L (ref 44–121)
ALT SERPL-CCNC: 40 IU/L (ref 0–32)
ANA SER QL IF: NEGATIVE
AST SERPL-CCNC: 28 IU/L (ref 0–40)
BILIRUB SERPL-MCNC: 0.8 MG/DL (ref 0–1.2)
BUN SERPL-MCNC: 11 MG/DL (ref 8–27)
BUN/CREAT SERPL: 16 (ref 12–28)
CALCIUM SERPL-MCNC: 9.8 MG/DL (ref 8.7–10.3)
CHLORIDE SERPL-SCNC: 105 MMOL/L (ref 96–106)
CO2 SERPL-SCNC: 25 MMOL/L (ref 20–29)
CREAT SERPL-MCNC: 0.7 MG/DL (ref 0.57–1)
CRP SERPL-MCNC: <1 MG/L (ref 0–10)
ENA SS-A AB SER IA-ACNC: <20 UNITS
ERYTHROCYTE [SEDIMENTATION RATE] IN BLOOD BY WESTERGREN METHOD: 3 MM/HR (ref 0–40)
GGT SERPL-CCNC: 15 IU/L (ref 0–60)
GLOBULIN SER CALC-MCNC: 2.3 G/DL (ref 1.5–4.5)
GLUCOSE SERPL-MCNC: 90 MG/DL (ref 65–99)
MAGNESIUM SERPL-MCNC: 2.5 MG/DL (ref 1.6–2.3)
POTASSIUM SERPL-SCNC: 4.7 MMOL/L (ref 3.5–5.2)
PROT SERPL-MCNC: 6.6 G/DL (ref 6–8.5)
SODIUM SERPL-SCNC: 142 MMOL/L (ref 134–144)

## 2022-03-18 PROBLEM — E66.01 SEVERE OBESITY (HCC): Status: ACTIVE | Noted: 2019-04-23

## 2022-03-18 PROBLEM — M22.2X2 PATELLOFEMORAL ARTHRALGIA OF BOTH KNEES: Status: ACTIVE | Noted: 2019-01-21

## 2022-03-18 PROBLEM — M22.2X1 PATELLOFEMORAL ARTHRALGIA OF BOTH KNEES: Status: ACTIVE | Noted: 2019-01-21

## 2022-03-18 PROBLEM — M17.0 PRIMARY OSTEOARTHRITIS OF BOTH KNEES: Status: ACTIVE | Noted: 2019-01-21

## 2022-03-19 PROBLEM — M48.062 LUMBAR STENOSIS WITH NEUROGENIC CLAUDICATION: Status: ACTIVE | Noted: 2020-12-15

## 2022-03-19 PROBLEM — M19.041 PRIMARY OSTEOARTHRITIS OF BOTH HANDS: Status: ACTIVE | Noted: 2019-01-21

## 2022-03-19 PROBLEM — M19.042 PRIMARY OSTEOARTHRITIS OF BOTH HANDS: Status: ACTIVE | Noted: 2019-01-21

## 2022-03-19 PROBLEM — M06.09 SERONEGATIVE RHEUMATOID ARTHRITIS OF MULTIPLE SITES (HCC): Status: ACTIVE | Noted: 2019-01-21

## 2022-03-20 PROBLEM — Z79.60 LONG-TERM USE OF IMMUNOSUPPRESSANT MEDICATION: Status: ACTIVE | Noted: 2019-04-23

## 2022-05-11 DIAGNOSIS — M06.09 SERONEGATIVE RHEUMATOID ARTHRITIS OF MULTIPLE SITES (HCC): ICD-10-CM

## 2022-05-12 RX ORDER — HYDROXYCHLOROQUINE SULFATE 200 MG/1
400 TABLET, FILM COATED ORAL DAILY
Qty: 180 TABLET | Refills: 1 | Status: SHIPPED | OUTPATIENT
Start: 2022-05-12

## 2022-05-18 LAB — HBA1C MFR BLD HPLC: 4.9 %

## 2022-06-30 ENCOUNTER — OFFICE VISIT (OUTPATIENT)
Dept: RHEUMATOLOGY | Age: 77
End: 2022-06-30
Payer: COMMERCIAL

## 2022-06-30 VITALS
DIASTOLIC BLOOD PRESSURE: 75 MMHG | WEIGHT: 174 LBS | HEIGHT: 61 IN | SYSTOLIC BLOOD PRESSURE: 135 MMHG | TEMPERATURE: 98 F | OXYGEN SATURATION: 98 % | RESPIRATION RATE: 20 BRPM | HEART RATE: 68 BPM | BODY MASS INDEX: 32.85 KG/M2

## 2022-06-30 DIAGNOSIS — R74.8 ELEVATED ALKALINE PHOSPHATASE LEVEL: ICD-10-CM

## 2022-06-30 DIAGNOSIS — Z79.60 LONG-TERM USE OF IMMUNOSUPPRESSANT MEDICATION: ICD-10-CM

## 2022-06-30 DIAGNOSIS — M06.09 SERONEGATIVE RHEUMATOID ARTHRITIS OF MULTIPLE SITES (HCC): Primary | ICD-10-CM

## 2022-06-30 PROCEDURE — 99214 OFFICE O/P EST MOD 30 MIN: CPT | Performed by: INTERNAL MEDICINE

## 2022-06-30 PROCEDURE — 1123F ACP DISCUSS/DSCN MKR DOCD: CPT | Performed by: INTERNAL MEDICINE

## 2022-06-30 NOTE — LETTER
6/30/2022    Patient: Shirley Morgan   YOB: 1945   Date of Visit: 6/30/2022     Michelle Marie MD  [address] [phone]  Via Fax: [phone]    Dear Michelle Marie MD,    We recently saw Ms. Shirley Morgan in the [facility] for evaluation. My notes for this consultation are attached. If you have questions, please do not hesitate to call me. I look forward to following your patient along with you.       Sincerely,    Manuela Benitez MD [facility]  Cell: [phone]

## 2022-06-30 NOTE — PROGRESS NOTES
Chief Complaint   Patient presents with    Arthritis    Joint Pain     hands     1. Have you been to the ER, urgent care clinic since your last visit? Hospitalized since your last visit? No    2. Have you seen or consulted any other health care providers outside of the [address] [place] [address] since your last visit? Include any pap smears or colon screening.  Yes Where: PCP

## 2022-06-30 NOTE — PROGRESS NOTES
REASON FOR VISIT    This is an in-office new Rheumatology provider follow-up visit for Ms. Francis for     ICD-10-CM   1. Seronegative rheumatoid arthritis of multiple sites ([facility] M06.09               Inflammatory arthritis phenotype includes:  Anti-CCP positive: no (1/21/19)  Rheumatoid factor positive: no (1/21/19)  Other serologies: 9/3/15 direct NEMESIO RNP+, NEMESIO IFA negative  Erosive disease: yes   Extra-articular manifestations include: none    Immunosuppression Screening (5/14/2021): Quantiferon TB: negative 5/14/21  PPD:  Not performed  Hepatitis B: negative   Hepatitis C: negative      Therapy History includes:  Current DMARD therapy include: methotrexate 25 mg SubQ every Wednesday (1/30/2020 to 12/2020 due to back surgery, 1/2021 to 11/21, facial rash)-> methotrexate 20mg PO every Wednesday (11/21- ), Humira 40 mg every Wednesday (7/23/2019 to 12/2020 due to back surgery, 1/2021 to present)  Prior DMARD therapy include: hydroxychloroquine 400 mg daily (1/2016 to 12/2017), hydroxychloroquine 600 mg daily (12/2017 to 1/21/2019; per PCP), methotrexate 20 mg PO every Wednesday (10/30/2019 to 1/24/2020), Humira 40 mg every 14 days (2/2019 to 7/23/2019)  Discontinued DMARDs because of inefficacy: hydroxychloroquine   Discontinued DMARDs because of side effects: None  Relative Contra-Indicated DMARDs because of fatty liver/hepatitis: hydroxychloroquine, sulfasalazine, methotrexate, leflunomide    HISTORY OF PRESENT ILLNESS    Ms. Lisa Yu returns for a follow-up. Still taking methotrexate 8 pills once a week and Plaquenil 400mg/day, Humira every Wednesday. Has been wearing compression gloves which provide some symptomatic relief. Saw ophthalmology for Plaquenil (hydroxychloroquine), goes back in July for cataract. No interval infections. Off of iron now, has switched to oral B12 repletions. Due for next colonoscopy in 2-3 years.     Had DXA done earlier this month,     Gets labs done monthly for her medication program. Believes these are improving. REVIEW OF SYSTEMS    A comprehensive review of systems was performed and pertinent results are documented in the HPI, review of systems is otherwise non-contributory. PAST MEDICAL HISTORY    She has a past medical history of Asthma, Chronic sinusitis, Fibromyalgia, GERD (gastroesophageal reflux disease), Hypertension, Leg wound, left (11/1/20 to present), Morbid obesity with BMI of 40.0-44.9, adult ([facility] (12/09/2020), Multiple allergies, Obstructive sleep apnea on CPAP, Osteopenia, and RA (rheumatoid arthritis) ([facility] FAMILY HISTORY    Her family history includes Coronary Art Dis in her father; Diabetes in her mother. SOCIAL HISTORY    She reports that she has never smoked. She has never used smokeless tobacco. She reports that she does not drink alcohol and does not use drugs. MEDICATIONS    Current Outpatient Medications   Medication Sig    hydrOXYchloroQUINE (PLAQUENIL) 200 mg tablet Take 2 Tablets by mouth daily.  methotrexate (RHEUMATREX) 2.5 mg tablet Take 8 Tablets by mouth every Wednesday.  folic acid (FOLVITE) 1 mg tablet TAKE 1 TABLET DAILY    cyanocobalamin (VITAMIN B12) 100 mcg tablet Take 100 mcg by mouth daily.  adalimumab (Humira,CF, Pen) 40 mg/0.4 mL injection pen 0.4 mL by SubCUTAneous route every Wednesday. Indications: rheumatoid arthritis    liraglutide (SAXENDA SC) 0.06 mg by SubCUTAneous route nightly. Self inject    B.infantis-B.ani-B.long-B.bifi (Probiotic 4X) 10-15 mg TbEC Take  by mouth daily.  aspirin delayed-release 81 mg tablet Take  by mouth daily.  olopatadine (PATANASE) 0.6 % spry two (2) times a day.  atorvastatin (LIPITOR) 10 mg tablet Take  by mouth daily.  losartan (COZAAR) 100 mg tablet Take 100 mg by mouth daily.  magnesium oxide (MAG-OX) 400 mg tablet Take 400 mg by mouth daily.  Cholecalciferol, Vitamin D3, 1,000 unit cap Take 1,000 Units by mouth two (2) times a day.     DULERA 200-5 mcg/actuation HFA inhaler Take 2 Puffs by inhalation two (2) times a day.  glucosamine-chondroit-vit c-mn (GLUCOSAMINE 1500 COMPLEX) 500-400 mg capsule Take 2 Caps by mouth nightly.  montelukast (SINGULAIR) 10 mg tablet Take 10 mg by mouth nightly.  esomeprazole (NEXIUM) 40 mg capsule Take  by mouth nightly.  cetirizine (ZYRTEC) 10 mg tablet Take  by mouth daily. No current facility-administered medications for this visit. ALLERGIES    Allergies   Allergen Reactions    Doxycycline Itching    Cottonseed Cough    Lamisil [Terbinafine] Rash    Malt Extract Cough              PHYSICAL EXAMINATION    Visit Vitals  /75 (BP 1 Location: Left upper arm, BP Patient Position: Sitting)   Pulse 68   Temp 98 °F (36.7 °C) (Oral)   Resp 20   Ht 5' 1\" (1.549 m)   Wt 174 lb (78.9 kg)   SpO2 98%   BMI 32.88 kg/m²     Body mass index is 32.88 kg/m². General:  The patient is well developed, well nourished, alert, and in no apparent distress. Eyes: Sclera are anicteric. No conjunctival injection. HEENT:  Oropharynx is clear. No oral ulcers. Adequate salivary pooling. No cervical or supraclavicular lymphadenopathy. Lungs:  Clear to auscultation bilaterally, without wheeze or stridor. Normal respiratory effort. Cor:  Regular rate and rhythm. No murmurs rubs or gallops. Abdomen: Soft, non-tender, without hepatomegaly or masses. Extremities: No calf tenderness, trace B LE edema. Warm and well perfused. Skin:  No significant abnormalities. Neuro: Nonfocal  Musculoskeletal:    A comprehensive musculoskeletal exam was performed for all joints of each upper and lower extremity and assessed for swelling, tenderness and range of motion. Results are documented as below:  Interval resolution of MCP tenderness, no synovitis.   Ulnar deviation of MCPs  Bilateral knee crepitus without warmth or swelling  No evidence of synovitis in the small joints of the hands, wrists, shoulders, elbows, hips, knees or ankles. DATA REVIEW    Laboratory     Recent laboratory results were reviewed, summarized, and discussed with the patient. 1/28/22: Cr 0.80, Tbili 0.8, AST 28, ALT 40, AlkP 158, GGT 15.    Imaging    Musculoskeletal Ultrasound    None    Radiographs    Bilateral Hand 1/21/2019: RIGHT: osteopenia. No acute fracture or dislocation. Radiocarpal and intercarpal joints are age-appropriate. No erosion of intercarpal or CMC joints. First MCP joint space narrowing is increased. There are small marginal osteophytes. No erosion. Anterior subluxation of the third MCP joint and joint space narrowing are increased. There are are increased subtle erosions of the third metacarpal head. Fifth MCP joint erosion is unchanged. Third PIP joint space narrowing is mild and unchanged. The DIP joints are within normal limits. LEFT: osteopenia. No acute fracture or dislocation. Mild anterior subluxation of the third MCP joint is increased. Radiocarpal and intercarpal joints are within normal limits. First CMC joint osteoarthritis is mild and new. Third MCP joint space narrowing and subtle erosions are increased. First MCP joint osteoarthritis is mild and new. IP joints are within normal limits. No periosteal reaction or chondrocalcinosis. Bilateral Foot 1/21/2019: RIGHT: osteopenia. No acute fracture or dislocation. Intertarsal and TMT joints are within normal limits. Hallux valgus angulation measures 45 degrees. Mild first MTP joint osteoarthritis. MTS joint subluxation and arthritis are partially imaged. Remaining MTP joints are within normal limits. IP joints are difficult to visualize given the toe positioning. There are soft tissue calcifications adjacent to the distal tibia. LEFT: osteopenia. No acute fracture or dislocation. Moderate plantar calcaneal spur. Mild talonavicular joint osteoarthritis. Possible third TMT joint osteoarthritis. Hallux valgus angulation measures 40 degrees.  Mild first MTP joint osteoarthritis. MTS joint subluxation and arthritis are partially imaged. Mild third PIP and DIP joint osteoarthritis. No evidence of IP joint erosion. Soft tissue calcifications are adjacent to the distal tibia. No periosteal reaction or chondrocalcinosis. Bilateral Hand 11/25/2015: RIGHT: no acute fracture or dislocation. Alignment is anatomic. Severe joint space narrowing is seen at the third MCP joint. Small erosions are suspected in the head of the third metacarpal. Mild joint space narrowing is seen in the first MCP joint. Generalized osteopenia is noted. Soft tissue swelling surrounds the third MCP joint. LEFT: no acute fracture or dislocation. Alignment is anatomic. Severe joint space is seen at the third  MCP joint mild joint space. Mild joint space narrowing is seen at the first MCP joint. No erosions are seen. Generalized osteopenia is noted. There is the suggestion of mild soft tissue swelling surrounding the third MCP joint. CT Imaging    CT Chest without contrast 1/16/2017: The thoracic inlet is unremarkable. No mediastinal nor hilar masses nor adenopathy. No thoracic aortic aneurysm. The central airways are patent. Minimal hypoventilation is appreciated panel placed. The lungs are otherwise clear. There is diffuse low attenuation within the liver parenchyma. The remaining visualized upper abdominal viscera are unremarkable. There are no aggressive appearing osseous lesions. Degenerative changes within the mid thoracic spine. MR Imaging    None    DXA      DXA 11/01/2016: (excluded L1, right hip, distal radius) lumbar spine L1-L3 T score 1.5 (BMD 1.183 g/cm2), left femoral neck T score: -0.5 (0.880 g/cm2). FRAX score 17 % probability in 10 years for major osteoporotic fracture and 3.7 % 10 year probability of hip fracture. ASSESSMENT AND PLAN    This is a follow-up visit for Ms. Francis for seronegative rheumatoid arthritis, with inflammatory disease in clinical remission on regimen of weekly Humira, methotrexate 20mg PO weekly, and Plaquenil (hydroxychloroquine). She agrees to release labs recently drawn to us, so we can ensure her low-grade transaminitis in January has resolved. 1) Seronegative Erosive Rheumatoid Arthritis.   -Cont Plaquenil (hydroxychloroquine) 400mg/day  -Cont methotrexate 20mg weekly  -Cont Humira weekly    2) Long Term Use of Immunosuppressants.   -Per patient labs including repeat LFTs were done within last month. She is releasing these to us, will need to ensure normalized transaminases before refilling methotrexate and Humira further  -Reviewed need for at least annual ophthalmologic exams, up to date. 3)  Elevated alkaline phosphatase level  - Normal GGT suggests bone origin  - CT abd/pelvis in 2015 did not note any bony inclusions suggestive of Paget's  - Low threshold for nuclear medicine scan with any climbing AlkPhos or bony pains    There are no Patient Instructions on file for this visit. Face to face time: 20 minutes  Note preparation and records review day of service: 14 minutes  Total provider time day of service: 34 minutes      The patient voiced understanding of the aforementioned assessment and plan. TODAY'S ORDERS    No orders of the defined types were placed in this encounter.     Future Appointments   Date Time Provider Dionisio Oglesby   1/3/2023 11:30 AM Tien Julian MD AO[facility] BS AMB   1/27/2023 10:30 AM Kriss Medrano MD ON[facility] BS CHIP Kruger MD    Adult Rheumatology   [facility]  [facility], [address] [place] [address]   Phone [phone]  Fax [phone]

## 2022-07-21 DIAGNOSIS — M06.09 SERONEGATIVE RHEUMATOID ARTHRITIS OF MULTIPLE SITES (HCC): ICD-10-CM

## 2022-07-21 DIAGNOSIS — Z79.60 LONG-TERM USE OF IMMUNOSUPPRESSANT MEDICATION: ICD-10-CM

## 2022-07-21 NOTE — TELEPHONE ENCOUNTER
Reba Munoz from [address] called to say the patient need a refill on Methotrexate. [phone]. ..  fax

## 2022-07-22 NOTE — TELEPHONE ENCOUNTER
Patient gets labs drawn roughly monthly through a mobile monitoring program.  Please reach out to patient to coordinate getting these labs--we need to ensure they are stable before we can refill the methorexate

## 2022-07-25 NOTE — TELEPHONE ENCOUNTER
Spoke with patient, states she was not given any labs on her last visit in June. States she will have her PCP to fax labs that she had done for them.

## 2022-07-28 NOTE — TELEPHONE ENCOUNTER
Pt called to follow up on the status of the refill for Methotrexate. She stated labs have been faxed twice to our office.     [phone]

## 2022-07-29 RX ORDER — METHOTREXATE 2.5 MG/1
20 TABLET ORAL
Qty: 40 TABLET | Refills: 0 | Status: SHIPPED | OUTPATIENT
Start: 2022-08-03 | End: 2022-07-29 | Stop reason: SDUPTHER

## 2022-11-15 ENCOUNTER — TELEPHONE (OUTPATIENT)
Dept: RHEUMATOLOGY | Age: 77
End: 2022-11-15

## 2022-11-15 DIAGNOSIS — Z79.60 LONG-TERM USE OF IMMUNOSUPPRESSANT MEDICATION: ICD-10-CM

## 2022-11-15 DIAGNOSIS — M06.09 SERONEGATIVE RHEUMATOID ARTHRITIS OF MULTIPLE SITES (HCC): ICD-10-CM

## 2022-11-15 RX ORDER — METHOTREXATE 2.5 MG/1
20 TABLET ORAL
Qty: 96 TABLET | Refills: 0 | Status: CANCELLED | OUTPATIENT
Start: 2022-11-16

## 2022-11-17 RX ORDER — ADALIMUMAB 40MG/0.4ML
40 KIT SUBCUTANEOUS
Qty: 2 KIT | Refills: 11 | Status: CANCELLED | OUTPATIENT
Start: 2022-11-23

## 2023-01-17 ENCOUNTER — TELEPHONE (OUTPATIENT)
Dept: ONCOLOGY | Age: 78
End: 2023-01-17

## 2023-01-17 NOTE — TELEPHONE ENCOUNTER
[address] at [facility]  [phone]    01/17/23-  Phone call placed to pt to remind pt to have labs drawn prior to her follow up appointment with . Pt verbalized understanding.

## 2023-01-18 NOTE — PROGRESS NOTES
[facility] at [address] [facility]  Joan Butter: [phone]  F: [phone]      Reason for Visit:   Franklin Santos is a 78 y.o. female who is seen for follow up of anemia. History of Present Illness:   She is doing well. Some fatigue, but improving. She has been off the B12 IM therapy for the past year, taking oral only and doing well. Off iron as well. She has lost a total of 50 pounds intentionally, now fairly stable. Exercising three times a week. Continues with problems related to he RA, on MTX, Humira, and plaquenil. Seeing a new rheumatologist in [place]. Review of systems was obtained and pertinent findings reviewed above. Past medical history, social history, family history, medications, and allergies are located in the electronic medical record. Physical Exam:     Visit Vitals  /68 (BP 1 Location: Left upper arm, BP Patient Position: Sitting, BP Cuff Size: Large adult) Comment: . Pulse 69   Temp 97.9 °F (36.6 °C) (Temporal)   Resp 16   LMP 11/25/2002   SpO2 100%     General: no distress  Respiratory: normal respiratory effort  CV: no peripheral edema  Skin: no rashes; no ecchymoses; no petechiae      Results:     Lab Results   Component Value Date/Time    WBC 5.6 01/17/2023 02:48 PM    HGB 13.5 01/17/2023 02:48 PM    HCT 38.8 01/17/2023 02:48 PM    PLATELET 919 38/52/6314 02:48 PM     (H) 01/17/2023 02:48 PM    ABS.  NEUTROPHILS 3.2 01/17/2023 02:48 PM     Lab Results   Component Value Date/Time    Sodium 142 01/28/2022 01:30 PM    Potassium 4.7 01/28/2022 01:30 PM    Chloride 105 01/28/2022 01:30 PM    CO2 25 01/28/2022 01:30 PM    Glucose 90 01/28/2022 01:30 PM    BUN 11 01/28/2022 01:30 PM    Creatinine 0.70 01/28/2022 01:30 PM    GFR est AA 99 01/28/2022 01:30 PM    GFR est non-AA 86 01/28/2022 01:30 PM    Calcium 9.8 01/28/2022 01:30 PM     Lab Results   Component Value Date/Time    Bilirubin, total 0.8 01/28/2022 01:30 PM    ALT (SGPT) 40 (H) 01/28/2022 01:30 PM    Alk. phosphatase 158 (H) 01/28/2022 01:30 PM    Protein, total 6.6 01/28/2022 01:30 PM    Albumin 4.3 01/28/2022 01:30 PM    Globulin 3.2 12/19/2020 02:16 AM     Lab Results   Component Value Date/Time    Iron % saturation 18 01/17/2023 02:48 PM    TIBC 285 01/17/2023 02:48 PM    Ferritin 194 (H) 01/17/2023 02:48 PM    Vitamin B12 1,953 (H) 01/17/2023 02:48 PM    Sed rate (ESR) 3 01/28/2022 01:30 PM    C-Reactive Protein, Qt <1 01/28/2022 01:30 PM    TSH 2.920 11/14/2016 11:19 AM    M-Kar Not Observed 01/21/2019 04:25 PM    HEP C VIRUS AB <0.1 11/25/2015 03:55 PM     Lab Results   Component Value Date/Time    INR 1.0 12/09/2020 12:22 PM    aPTT 24.2 12/09/2020 12:22 PM     HGB (g/dL)   Date Value   01/17/2023 13.5   01/19/2022 13.8   10/21/2021 14.3   05/14/2021 13.0   12/21/2020 7.7 (L)   12/20/2020 7.7 (L)   12/19/2020 7.2 (L)   12/18/2020 6.8 (L)   12/18/2020 6.4 (L)   12/18/2020 6.3 (L)   12/17/2020 7.2 (L)   12/17/2020 6.9 (L)   12/16/2020 7.4 (L)   12/16/2020 6.8 (L)   12/09/2020 10.0 (L)   01/24/2020 10.0 (L)   01/21/2019 10.9 (L)   05/04/2017 12.0       5/16/2021  Ferritin: 69  Folate: >20  B12: 253        Assessment:   1) Anemia  Chronic since 1/2019, worsened after back surgery, though resolved on most recent checks in 5/2021 and 10/2021. This was most likely due to iron deficiency, along with blood loss from surgery, improved now after oral iron replacement. Her HGB remains normal now at 13.5. No recurrent iron deficiency, off oral iron for the past year. I will release her to monitor her CBC yearly with her PCP. 2) Macrocytosis  Persists despitee B12 IM supplementation from her PCP. Likely due to her MTX, now uncovered after correcting her iron deficiency. Monitor. 3) B12 deficiency  Level remains elevated at [address] despite stopping IM therapy and trasitioning to oral therapy BID. I recommend she drop to once a day.   Monitor B12 level yearly with her PCP. Plan:     Continue oral B12, reduce to daily  Follow up with PCP for yearly CBC, B12  Return to see me as needed    A total of 20 minutes were spent on the day of the visit reviewing the patients diagnostic tests, seeing the patient, and documenting in the record.       Signed By: Melba Chapman MD

## 2023-01-27 ENCOUNTER — OFFICE VISIT (OUTPATIENT)
Dept: ONCOLOGY | Age: 78
End: 2023-01-27
Payer: COMMERCIAL

## 2023-01-27 VITALS
OXYGEN SATURATION: 100 % | RESPIRATION RATE: 16 BRPM | TEMPERATURE: 97.9 F | HEART RATE: 69 BPM | DIASTOLIC BLOOD PRESSURE: 68 MMHG | SYSTOLIC BLOOD PRESSURE: 132 MMHG

## 2023-01-27 DIAGNOSIS — D64.9 ANEMIA, UNSPECIFIED TYPE: Primary | ICD-10-CM

## 2023-01-27 NOTE — PROGRESS NOTES
Ronak Guadalupe is a 78 y.o. female follow up for anemia. 1. Have you been to the ER, urgent care clinic since your last visit? Hospitalized since your last visit?no    2. Have you seen or consulted any other health care providers outside of the [address] [place] [address] since your last visit? Include any pap smears or colon screening.  no

## 2023-05-24 RX ORDER — MONTELUKAST SODIUM 10 MG/1
10 TABLET ORAL NIGHTLY
COMMUNITY

## 2023-05-24 RX ORDER — ATORVASTATIN CALCIUM 10 MG/1
TABLET, FILM COATED ORAL DAILY
COMMUNITY

## 2023-05-24 RX ORDER — ESOMEPRAZOLE MAGNESIUM 40 MG/1
CAPSULE, DELAYED RELEASE ORAL
COMMUNITY

## 2023-05-24 RX ORDER — HYDROXYCHLOROQUINE SULFATE 200 MG/1
400 TABLET, FILM COATED ORAL DAILY
COMMUNITY
Start: 2022-05-12

## 2023-05-24 RX ORDER — LOSARTAN POTASSIUM 100 MG/1
100 TABLET ORAL DAILY
COMMUNITY

## 2023-05-24 RX ORDER — ASPIRIN 81 MG/1
TABLET ORAL DAILY
COMMUNITY

## 2023-05-24 RX ORDER — CETIRIZINE HYDROCHLORIDE 10 MG/1
TABLET ORAL DAILY
COMMUNITY

## 2023-05-24 RX ORDER — OLOPATADINE HYDROCHLORIDE 665 UG/1
SPRAY NASAL 2 TIMES DAILY
COMMUNITY

## 2023-05-24 RX ORDER — MAGNESIUM OXIDE 400 MG/1
400 TABLET ORAL DAILY
COMMUNITY

## 2023-05-24 RX ORDER — SODIUM PHOSPHATE,MONO-DIBASIC 19G-7G/118
2 ENEMA (ML) RECTAL
COMMUNITY

## 2023-05-24 RX ORDER — FOLIC ACID 1 MG/1
1 TABLET ORAL DAILY
COMMUNITY
Start: 2022-01-28

## 2023-05-24 RX ORDER — ADALIMUMAB 40MG/0.4ML
40 KIT SUBCUTANEOUS
COMMUNITY
Start: 2021-12-15

## 2025-08-25 ENCOUNTER — APPOINTMENT (OUTPATIENT)
Dept: VASCULAR SURGERY | Facility: HOSPITAL | Age: 80
DRG: 603 | End: 2025-08-25
Payer: COMMERCIAL

## 2025-08-25 ENCOUNTER — APPOINTMENT (OUTPATIENT)
Facility: HOSPITAL | Age: 80
DRG: 603 | End: 2025-08-25
Payer: COMMERCIAL

## 2025-08-25 ENCOUNTER — HOSPITAL ENCOUNTER (INPATIENT)
Facility: HOSPITAL | Age: 80
LOS: 2 days | Discharge: HOME | DRG: 603 | End: 2025-08-28
Attending: STUDENT IN AN ORGANIZED HEALTH CARE EDUCATION/TRAINING PROGRAM | Admitting: STUDENT IN AN ORGANIZED HEALTH CARE EDUCATION/TRAINING PROGRAM
Payer: COMMERCIAL

## 2025-08-25 DIAGNOSIS — R22.42 LOCALIZED SWELLING OF LEFT LOWER EXTREMITY: Primary | ICD-10-CM

## 2025-08-25 DIAGNOSIS — Z78.9 FAILURE OF OUTPATIENT TREATMENT: ICD-10-CM

## 2025-08-25 DIAGNOSIS — I82.412 ACUTE DEEP VEIN THROMBOSIS (DVT) OF FEMORAL VEIN OF LEFT LOWER EXTREMITY (HCC): ICD-10-CM

## 2025-08-25 LAB
ALBUMIN SERPL-MCNC: 3 G/DL (ref 3.5–5.2)
ALBUMIN/GLOB SERPL: 1 (ref 1.1–2.2)
ALP SERPL-CCNC: 121 U/L (ref 35–104)
ALT SERPL-CCNC: 34 U/L (ref 10–35)
ANION GAP SERPL CALC-SCNC: 13 MMOL/L (ref 2–14)
AST SERPL-CCNC: 31 U/L (ref 10–35)
BASOPHILS # BLD: 0.02 K/UL (ref 0–0.1)
BASOPHILS NFR BLD: 0.3 % (ref 0–1)
BILIRUB SERPL-MCNC: 1.2 MG/DL (ref 0–1.2)
BUN SERPL-MCNC: 16 MG/DL (ref 8–23)
BUN/CREAT SERPL: 16 (ref 12–20)
CALCIUM SERPL-MCNC: 9.2 MG/DL (ref 8.8–10.2)
CHLORIDE SERPL-SCNC: 99 MMOL/L (ref 98–107)
CO2 SERPL-SCNC: 24 MMOL/L (ref 20–29)
COMMENT:: NORMAL
CREAT SERPL-MCNC: 0.99 MG/DL (ref 0.6–1)
DIFFERENTIAL METHOD BLD: ABNORMAL
EOSINOPHIL # BLD: 0.17 K/UL (ref 0–0.4)
EOSINOPHIL NFR BLD: 2.5 % (ref 0–7)
ERYTHROCYTE [DISTWIDTH] IN BLOOD BY AUTOMATED COUNT: 13.6 % (ref 11.5–14.5)
GLOBULIN SER CALC-MCNC: 3 G/DL (ref 2–4)
GLUCOSE SERPL-MCNC: 106 MG/DL (ref 65–100)
HCT VFR BLD AUTO: 31.2 % (ref 35–47)
HGB BLD-MCNC: 10.8 G/DL (ref 11.5–16)
IMM GRANULOCYTES # BLD AUTO: 0.03 K/UL (ref 0–0.04)
IMM GRANULOCYTES NFR BLD AUTO: 0.4 % (ref 0–0.5)
LYMPHOCYTES # BLD: 1.23 K/UL (ref 0.8–3.5)
LYMPHOCYTES NFR BLD: 18.3 % (ref 12–49)
MCH RBC QN AUTO: 35.5 PG (ref 26–34)
MCHC RBC AUTO-ENTMCNC: 34.6 G/DL (ref 30–36.5)
MCV RBC AUTO: 102.6 FL (ref 80–99)
MONOCYTES # BLD: 0.67 K/UL (ref 0–1)
MONOCYTES NFR BLD: 10 % (ref 5–13)
NEUTS SEG # BLD: 4.6 K/UL (ref 1.8–8)
NEUTS SEG NFR BLD: 68.5 % (ref 32–75)
NRBC # BLD: 0 K/UL (ref 0–0.01)
NRBC BLD-RTO: 0 PER 100 WBC
NT PRO BNP: 135 PG/ML (ref 0–450)
PLATELET # BLD AUTO: 170 K/UL (ref 150–400)
PMV BLD AUTO: 10.5 FL (ref 8.9–12.9)
POTASSIUM SERPL-SCNC: 3.3 MMOL/L (ref 3.5–5.1)
PROT SERPL-MCNC: 6 G/DL (ref 6.4–8.3)
RBC # BLD AUTO: 3.04 M/UL (ref 3.8–5.2)
SODIUM SERPL-SCNC: 136 MMOL/L (ref 136–145)
SPECIMEN HOLD: NORMAL
WBC # BLD AUTO: 6.7 K/UL (ref 3.6–11)

## 2025-08-25 PROCEDURE — 83880 ASSAY OF NATRIURETIC PEPTIDE: CPT

## 2025-08-25 PROCEDURE — 6360000004 HC RX CONTRAST MEDICATION: Performed by: STUDENT IN AN ORGANIZED HEALTH CARE EDUCATION/TRAINING PROGRAM

## 2025-08-25 PROCEDURE — 71275 CT ANGIOGRAPHY CHEST: CPT

## 2025-08-25 PROCEDURE — 93971 EXTREMITY STUDY: CPT

## 2025-08-25 PROCEDURE — 99285 EMERGENCY DEPT VISIT HI MDM: CPT

## 2025-08-25 PROCEDURE — 36415 COLL VENOUS BLD VENIPUNCTURE: CPT

## 2025-08-25 PROCEDURE — 85025 COMPLETE CBC W/AUTO DIFF WBC: CPT

## 2025-08-25 PROCEDURE — 80053 COMPREHEN METABOLIC PANEL: CPT

## 2025-08-25 RX ORDER — IOPAMIDOL 755 MG/ML
100 INJECTION, SOLUTION INTRAVASCULAR
Status: COMPLETED | OUTPATIENT
Start: 2025-08-25 | End: 2025-08-25

## 2025-08-25 RX ORDER — HEPARIN SODIUM 1000 [USP'U]/ML
80 INJECTION, SOLUTION INTRAVENOUS; SUBCUTANEOUS PRN
Status: DISCONTINUED | OUTPATIENT
Start: 2025-08-25 | End: 2025-08-26

## 2025-08-25 RX ORDER — HEPARIN SODIUM 1000 [USP'U]/ML
40 INJECTION, SOLUTION INTRAVENOUS; SUBCUTANEOUS PRN
Status: DISCONTINUED | OUTPATIENT
Start: 2025-08-25 | End: 2025-08-26

## 2025-08-25 RX ORDER — HEPARIN SODIUM 10000 [USP'U]/100ML
5-30 INJECTION, SOLUTION INTRAVENOUS CONTINUOUS
Status: DISCONTINUED | OUTPATIENT
Start: 2025-08-25 | End: 2025-08-26

## 2025-08-25 RX ORDER — HEPARIN SODIUM 1000 [USP'U]/ML
80 INJECTION, SOLUTION INTRAVENOUS; SUBCUTANEOUS ONCE
Status: COMPLETED | OUTPATIENT
Start: 2025-08-25 | End: 2025-08-26

## 2025-08-25 RX ADMIN — IOPAMIDOL 100 ML: 755 INJECTION, SOLUTION INTRAVENOUS at 22:42

## 2025-08-25 ASSESSMENT — PAIN - FUNCTIONAL ASSESSMENT: PAIN_FUNCTIONAL_ASSESSMENT: 0-10

## 2025-08-25 ASSESSMENT — PAIN DESCRIPTION - LOCATION: LOCATION: LEG

## 2025-08-25 ASSESSMENT — PAIN SCALES - GENERAL: PAINLEVEL_OUTOF10: 3

## 2025-08-25 ASSESSMENT — PAIN DESCRIPTION - ORIENTATION: ORIENTATION: LEFT

## 2025-08-26 PROBLEM — K76.0 NAFLD (NONALCOHOLIC FATTY LIVER DISEASE): Status: ACTIVE | Noted: 2025-08-26

## 2025-08-26 PROBLEM — I87.2 CHRONIC VENOUS INSUFFICIENCY: Status: ACTIVE | Noted: 2025-08-26

## 2025-08-26 PROBLEM — L03.115 CELLULITIS OF BOTH LOWER EXTREMITIES: Status: ACTIVE | Noted: 2025-08-26

## 2025-08-26 PROBLEM — I82.402 DEEP VEIN THROMBOSIS (DVT) OF LEFT LOWER EXTREMITY (HCC): Status: ACTIVE | Noted: 2025-08-26

## 2025-08-26 PROBLEM — I10 HTN (HYPERTENSION), BENIGN: Status: ACTIVE | Noted: 2025-08-26

## 2025-08-26 PROBLEM — L03.116 CELLULITIS OF BOTH LOWER EXTREMITIES: Status: ACTIVE | Noted: 2025-08-26

## 2025-08-26 PROBLEM — E78.2 MIXED HYPERLIPIDEMIA: Status: ACTIVE | Noted: 2025-08-26

## 2025-08-26 PROBLEM — L03.90 CELLULITIS: Status: ACTIVE | Noted: 2025-08-26

## 2025-08-26 LAB
APTT PPP: 32.1 SEC (ref 22.1–31)
APTT PPP: 34.5 SEC (ref 22.1–31)
ERYTHROCYTE [DISTWIDTH] IN BLOOD BY AUTOMATED COUNT: 13.8 % (ref 11.5–14.5)
HCT VFR BLD AUTO: 29.1 % (ref 35–47)
HGB BLD-MCNC: 10.3 G/DL (ref 11.5–16)
INR PPP: 1.1 (ref 0.9–1.1)
MCH RBC QN AUTO: 35.3 PG (ref 26–34)
MCHC RBC AUTO-ENTMCNC: 35.4 G/DL (ref 30–36.5)
MCV RBC AUTO: 99.7 FL (ref 80–99)
NRBC # BLD: 0 K/UL (ref 0–0.01)
NRBC BLD-RTO: 0 PER 100 WBC
PLATELET # BLD AUTO: 152 K/UL (ref 150–400)
PMV BLD AUTO: 10.1 FL (ref 8.9–12.9)
PROTHROMBIN TIME: 11.8 SEC (ref 9.2–11.2)
RBC # BLD AUTO: 2.92 M/UL (ref 3.8–5.2)
THERAPEUTIC RANGE: ABNORMAL SECS (ref 58–77)
THERAPEUTIC RANGE: ABNORMAL SECS (ref 58–77)
UFH PPP CHRO-ACNC: >1.5 IU/ML
WBC # BLD AUTO: 6 K/UL (ref 3.6–11)

## 2025-08-26 PROCEDURE — 2500000003 HC RX 250 WO HCPCS: Performed by: STUDENT IN AN ORGANIZED HEALTH CARE EDUCATION/TRAINING PROGRAM

## 2025-08-26 PROCEDURE — 97161 PT EVAL LOW COMPLEX 20 MIN: CPT

## 2025-08-26 PROCEDURE — 6360000002 HC RX W HCPCS: Performed by: INTERNAL MEDICINE

## 2025-08-26 PROCEDURE — 1100000000 HC RM PRIVATE

## 2025-08-26 PROCEDURE — 2580000003 HC RX 258: Performed by: STUDENT IN AN ORGANIZED HEALTH CARE EDUCATION/TRAINING PROGRAM

## 2025-08-26 PROCEDURE — 6370000000 HC RX 637 (ALT 250 FOR IP): Performed by: STUDENT IN AN ORGANIZED HEALTH CARE EDUCATION/TRAINING PROGRAM

## 2025-08-26 PROCEDURE — 6360000002 HC RX W HCPCS: Performed by: STUDENT IN AN ORGANIZED HEALTH CARE EDUCATION/TRAINING PROGRAM

## 2025-08-26 PROCEDURE — 94761 N-INVAS EAR/PLS OXIMETRY MLT: CPT

## 2025-08-26 PROCEDURE — 94640 AIRWAY INHALATION TREATMENT: CPT

## 2025-08-26 PROCEDURE — 6370000000 HC RX 637 (ALT 250 FOR IP): Performed by: INTERNAL MEDICINE

## 2025-08-26 PROCEDURE — 97165 OT EVAL LOW COMPLEX 30 MIN: CPT

## 2025-08-26 PROCEDURE — 97116 GAIT TRAINING THERAPY: CPT

## 2025-08-26 PROCEDURE — 96365 THER/PROPH/DIAG IV INF INIT: CPT

## 2025-08-26 PROCEDURE — 85520 HEPARIN ASSAY: CPT

## 2025-08-26 PROCEDURE — 85610 PROTHROMBIN TIME: CPT

## 2025-08-26 PROCEDURE — 85730 THROMBOPLASTIN TIME PARTIAL: CPT

## 2025-08-26 PROCEDURE — 97535 SELF CARE MNGMENT TRAINING: CPT

## 2025-08-26 PROCEDURE — 85027 COMPLETE CBC AUTOMATED: CPT

## 2025-08-26 PROCEDURE — 36415 COLL VENOUS BLD VENIPUNCTURE: CPT

## 2025-08-26 RX ORDER — POLYETHYLENE GLYCOL 3350 17 G/17G
17 POWDER, FOR SOLUTION ORAL DAILY PRN
Status: DISCONTINUED | OUTPATIENT
Start: 2025-08-26 | End: 2025-08-28 | Stop reason: HOSPADM

## 2025-08-26 RX ORDER — POTASSIUM CHLORIDE 7.45 MG/ML
10 INJECTION INTRAVENOUS PRN
Status: DISCONTINUED | OUTPATIENT
Start: 2025-08-26 | End: 2025-08-28 | Stop reason: HOSPADM

## 2025-08-26 RX ORDER — ACETAMINOPHEN 650 MG/1
650 SUPPOSITORY RECTAL EVERY 6 HOURS PRN
Status: DISCONTINUED | OUTPATIENT
Start: 2025-08-26 | End: 2025-08-28 | Stop reason: HOSPADM

## 2025-08-26 RX ORDER — MONTELUKAST SODIUM 10 MG/1
10 TABLET ORAL NIGHTLY
Status: DISCONTINUED | OUTPATIENT
Start: 2025-08-27 | End: 2025-08-28 | Stop reason: HOSPADM

## 2025-08-26 RX ORDER — HYDROXYCHLOROQUINE SULFATE 200 MG/1
400 TABLET, FILM COATED ORAL DAILY
Status: DISCONTINUED | OUTPATIENT
Start: 2025-08-27 | End: 2025-08-28 | Stop reason: HOSPADM

## 2025-08-26 RX ORDER — POTASSIUM CHLORIDE 750 MG/1
40 TABLET, EXTENDED RELEASE ORAL PRN
Status: DISCONTINUED | OUTPATIENT
Start: 2025-08-26 | End: 2025-08-28 | Stop reason: HOSPADM

## 2025-08-26 RX ORDER — SODIUM CHLORIDE 9 MG/ML
INJECTION, SOLUTION INTRAVENOUS PRN
Status: DISCONTINUED | OUTPATIENT
Start: 2025-08-26 | End: 2025-08-28 | Stop reason: HOSPADM

## 2025-08-26 RX ORDER — FOLIC ACID 1 MG/1
1000 TABLET ORAL DAILY
Status: DISCONTINUED | OUTPATIENT
Start: 2025-08-27 | End: 2025-08-28 | Stop reason: HOSPADM

## 2025-08-26 RX ORDER — LOSARTAN POTASSIUM 50 MG/1
100 TABLET ORAL DAILY
Status: DISCONTINUED | OUTPATIENT
Start: 2025-08-26 | End: 2025-08-28 | Stop reason: HOSPADM

## 2025-08-26 RX ORDER — BUDESONIDE, GLYCOPYRROLATE, AND FORMOTEROL FUMARATE 160; 9; 4.8 UG/1; UG/1; UG/1
AEROSOL, METERED RESPIRATORY (INHALATION) 2 TIMES DAILY
COMMUNITY

## 2025-08-26 RX ORDER — ACETAMINOPHEN 325 MG/1
650 TABLET ORAL EVERY 6 HOURS PRN
Status: DISCONTINUED | OUTPATIENT
Start: 2025-08-26 | End: 2025-08-28 | Stop reason: HOSPADM

## 2025-08-26 RX ORDER — SODIUM CHLORIDE 0.9 % (FLUSH) 0.9 %
5-40 SYRINGE (ML) INJECTION PRN
Status: DISCONTINUED | OUTPATIENT
Start: 2025-08-26 | End: 2025-08-28 | Stop reason: HOSPADM

## 2025-08-26 RX ORDER — TRAMADOL HYDROCHLORIDE 50 MG/1
50 TABLET ORAL EVERY 6 HOURS PRN
COMMUNITY

## 2025-08-26 RX ORDER — ATORVASTATIN CALCIUM 10 MG/1
10 TABLET, FILM COATED ORAL DAILY
Status: DISCONTINUED | OUTPATIENT
Start: 2025-08-27 | End: 2025-08-28 | Stop reason: HOSPADM

## 2025-08-26 RX ORDER — BUDESONIDE 0.5 MG/2ML
0.5 INHALANT ORAL
Status: DISCONTINUED | OUTPATIENT
Start: 2025-08-26 | End: 2025-08-27

## 2025-08-26 RX ORDER — POTASSIUM CHLORIDE 750 MG/1
40 TABLET, EXTENDED RELEASE ORAL EVERY 4 HOURS
Status: COMPLETED | OUTPATIENT
Start: 2025-08-26 | End: 2025-08-26

## 2025-08-26 RX ORDER — MAGNESIUM SULFATE IN WATER 40 MG/ML
2000 INJECTION, SOLUTION INTRAVENOUS PRN
Status: DISCONTINUED | OUTPATIENT
Start: 2025-08-26 | End: 2025-08-28 | Stop reason: HOSPADM

## 2025-08-26 RX ORDER — FUROSEMIDE 40 MG/1
40 TABLET ORAL DAILY
COMMUNITY

## 2025-08-26 RX ORDER — FUROSEMIDE 40 MG/1
40 TABLET ORAL DAILY
Status: DISCONTINUED | OUTPATIENT
Start: 2025-08-26 | End: 2025-08-26

## 2025-08-26 RX ORDER — SODIUM CHLORIDE 0.9 % (FLUSH) 0.9 %
5-40 SYRINGE (ML) INJECTION EVERY 12 HOURS SCHEDULED
Status: DISCONTINUED | OUTPATIENT
Start: 2025-08-26 | End: 2025-08-28 | Stop reason: HOSPADM

## 2025-08-26 RX ORDER — TRAMADOL HYDROCHLORIDE 50 MG/1
50 TABLET ORAL EVERY 6 HOURS PRN
Status: DISCONTINUED | OUTPATIENT
Start: 2025-08-26 | End: 2025-08-28 | Stop reason: HOSPADM

## 2025-08-26 RX ORDER — FUROSEMIDE 10 MG/ML
60 INJECTION INTRAMUSCULAR; INTRAVENOUS 2 TIMES DAILY
Status: DISCONTINUED | OUTPATIENT
Start: 2025-08-26 | End: 2025-08-28 | Stop reason: HOSPADM

## 2025-08-26 RX ORDER — ARFORMOTEROL TARTRATE 15 UG/2ML
15 SOLUTION RESPIRATORY (INHALATION)
Status: DISCONTINUED | OUTPATIENT
Start: 2025-08-26 | End: 2025-08-27

## 2025-08-26 RX ORDER — LOSARTAN POTASSIUM 50 MG/1
100 TABLET ORAL DAILY
Status: DISCONTINUED | OUTPATIENT
Start: 2025-08-27 | End: 2025-08-26

## 2025-08-26 RX ORDER — ALBUTEROL SULFATE 0.83 MG/ML
2.5 SOLUTION RESPIRATORY (INHALATION) EVERY 4 HOURS PRN
Status: DISCONTINUED | OUTPATIENT
Start: 2025-08-26 | End: 2025-08-28 | Stop reason: HOSPADM

## 2025-08-26 RX ORDER — CETIRIZINE HYDROCHLORIDE 10 MG/1
10 TABLET ORAL DAILY
Status: DISCONTINUED | OUTPATIENT
Start: 2025-08-27 | End: 2025-08-28 | Stop reason: HOSPADM

## 2025-08-26 RX ORDER — COLCHICINE 0.6 MG/1
0.6 CAPSULE ORAL DAILY
Status: DISCONTINUED | OUTPATIENT
Start: 2025-08-26 | End: 2025-08-26

## 2025-08-26 RX ORDER — MULTIVITAMIN WITH IRON
1000 TABLET ORAL DAILY
Status: DISCONTINUED | OUTPATIENT
Start: 2025-08-26 | End: 2025-08-28 | Stop reason: HOSPADM

## 2025-08-26 RX ORDER — ONDANSETRON 2 MG/ML
4 INJECTION INTRAMUSCULAR; INTRAVENOUS EVERY 6 HOURS PRN
Status: DISCONTINUED | OUTPATIENT
Start: 2025-08-26 | End: 2025-08-28 | Stop reason: HOSPADM

## 2025-08-26 RX ORDER — COLCHICINE 0.6 MG/1
0.6 TABLET ORAL DAILY
Status: DISCONTINUED | OUTPATIENT
Start: 2025-08-26 | End: 2025-08-28 | Stop reason: HOSPADM

## 2025-08-26 RX ORDER — ONDANSETRON 4 MG/1
4 TABLET, ORALLY DISINTEGRATING ORAL EVERY 8 HOURS PRN
Status: DISCONTINUED | OUTPATIENT
Start: 2025-08-26 | End: 2025-08-28 | Stop reason: HOSPADM

## 2025-08-26 RX ADMIN — COLCHICINE 0.6 MG: 0.6 TABLET, FILM COATED ORAL at 09:49

## 2025-08-26 RX ADMIN — FUROSEMIDE 60 MG: 10 INJECTION, SOLUTION INTRAMUSCULAR; INTRAVENOUS at 17:34

## 2025-08-26 RX ADMIN — POTASSIUM CHLORIDE 40 MEQ: 750 TABLET, FILM COATED, EXTENDED RELEASE ORAL at 09:49

## 2025-08-26 RX ADMIN — ARFORMOTEROL TARTRATE 15 MCG: 15 SOLUTION RESPIRATORY (INHALATION) at 19:04

## 2025-08-26 RX ADMIN — POTASSIUM CHLORIDE 40 MEQ: 750 TABLET, FILM COATED, EXTENDED RELEASE ORAL at 13:59

## 2025-08-26 RX ADMIN — IPRATROPIUM BROMIDE 0.5 MG: 0.5 SOLUTION RESPIRATORY (INHALATION) at 18:58

## 2025-08-26 RX ADMIN — IPRATROPIUM BROMIDE 0.5 MG: 0.5 SOLUTION RESPIRATORY (INHALATION) at 14:08

## 2025-08-26 RX ADMIN — BUDESONIDE 500 MCG: 0.5 INHALANT RESPIRATORY (INHALATION) at 19:04

## 2025-08-26 RX ADMIN — HEPARIN SODIUM 7300 UNITS: 1000 INJECTION, SOLUTION INTRAVENOUS; SUBCUTANEOUS at 00:13

## 2025-08-26 RX ADMIN — POTASSIUM CHLORIDE 40 MEQ: 750 TABLET, FILM COATED, EXTENDED RELEASE ORAL at 06:47

## 2025-08-26 RX ADMIN — FUROSEMIDE 60 MG: 10 INJECTION, SOLUTION INTRAMUSCULAR; INTRAVENOUS at 09:49

## 2025-08-26 RX ADMIN — APIXABAN 5 MG: 5 TABLET, FILM COATED ORAL at 21:51

## 2025-08-26 RX ADMIN — LOSARTAN POTASSIUM 100 MG: 50 TABLET, FILM COATED ORAL at 09:49

## 2025-08-26 RX ADMIN — POTASSIUM CHLORIDE 40 MEQ: 750 TABLET, FILM COATED, EXTENDED RELEASE ORAL at 17:34

## 2025-08-26 RX ADMIN — ONDANSETRON 4 MG: 2 INJECTION, SOLUTION INTRAMUSCULAR; INTRAVENOUS at 07:12

## 2025-08-26 RX ADMIN — CEFEPIME 2000 MG: 2 INJECTION, POWDER, FOR SOLUTION INTRAVENOUS at 11:22

## 2025-08-26 RX ADMIN — CYANOCOBALAMIN TAB 500 MCG 1000 MCG: 500 TAB at 09:51

## 2025-08-26 RX ADMIN — APIXABAN 5 MG: 5 TABLET, FILM COATED ORAL at 13:59

## 2025-08-26 RX ADMIN — HEPARIN SODIUM AND DEXTROSE 18 UNITS/KG/HR: 10000; 5 INJECTION INTRAVENOUS at 00:19

## 2025-08-26 RX ADMIN — SODIUM CHLORIDE, PRESERVATIVE FREE 10 ML: 5 INJECTION INTRAVENOUS at 21:51

## 2025-08-26 RX ADMIN — VANCOMYCIN HYDROCHLORIDE 2250 MG: 10 INJECTION, POWDER, LYOPHILIZED, FOR SOLUTION INTRAVENOUS at 07:15

## 2025-08-26 ASSESSMENT — PAIN SCALES - GENERAL
PAINLEVEL_OUTOF10: 0
PAINLEVEL_OUTOF10: 2
PAINLEVEL_OUTOF10: 0

## 2025-08-26 ASSESSMENT — LIFESTYLE VARIABLES
HOW MANY STANDARD DRINKS CONTAINING ALCOHOL DO YOU HAVE ON A TYPICAL DAY: PATIENT DOES NOT DRINK
HOW OFTEN DO YOU HAVE A DRINK CONTAINING ALCOHOL: NEVER

## 2025-08-27 LAB
COMMENT:: NORMAL
ECHO BSA: 1.96 M2
SPECIMEN HOLD: NORMAL

## 2025-08-27 PROCEDURE — 2500000003 HC RX 250 WO HCPCS: Performed by: STUDENT IN AN ORGANIZED HEALTH CARE EDUCATION/TRAINING PROGRAM

## 2025-08-27 PROCEDURE — 6360000002 HC RX W HCPCS: Performed by: STUDENT IN AN ORGANIZED HEALTH CARE EDUCATION/TRAINING PROGRAM

## 2025-08-27 PROCEDURE — 94761 N-INVAS EAR/PLS OXIMETRY MLT: CPT

## 2025-08-27 PROCEDURE — 94640 AIRWAY INHALATION TREATMENT: CPT

## 2025-08-27 PROCEDURE — 97116 GAIT TRAINING THERAPY: CPT

## 2025-08-27 PROCEDURE — 6370000000 HC RX 637 (ALT 250 FOR IP): Performed by: STUDENT IN AN ORGANIZED HEALTH CARE EDUCATION/TRAINING PROGRAM

## 2025-08-27 PROCEDURE — 6370000000 HC RX 637 (ALT 250 FOR IP): Performed by: INTERNAL MEDICINE

## 2025-08-27 PROCEDURE — 2580000003 HC RX 258: Performed by: STUDENT IN AN ORGANIZED HEALTH CARE EDUCATION/TRAINING PROGRAM

## 2025-08-27 PROCEDURE — 1100000000 HC RM PRIVATE

## 2025-08-27 PROCEDURE — 6360000002 HC RX W HCPCS: Performed by: INTERNAL MEDICINE

## 2025-08-27 RX ORDER — ARFORMOTEROL TARTRATE 15 UG/2ML
15 SOLUTION RESPIRATORY (INHALATION)
Status: DISCONTINUED | OUTPATIENT
Start: 2025-08-27 | End: 2025-08-28 | Stop reason: HOSPADM

## 2025-08-27 RX ORDER — BUDESONIDE 0.5 MG/2ML
0.5 INHALANT ORAL
Status: DISCONTINUED | OUTPATIENT
Start: 2025-08-27 | End: 2025-08-28 | Stop reason: HOSPADM

## 2025-08-27 RX ADMIN — COLCHICINE 0.6 MG: 0.6 TABLET, FILM COATED ORAL at 09:24

## 2025-08-27 RX ADMIN — FOLIC ACID 1000 MCG: 1 TABLET ORAL at 09:24

## 2025-08-27 RX ADMIN — MONTELUKAST 10 MG: 10 TABLET, FILM COATED ORAL at 21:29

## 2025-08-27 RX ADMIN — SODIUM CHLORIDE, PRESERVATIVE FREE 10 ML: 5 INJECTION INTRAVENOUS at 09:24

## 2025-08-27 RX ADMIN — SODIUM CHLORIDE, PRESERVATIVE FREE 10 ML: 5 INJECTION INTRAVENOUS at 21:15

## 2025-08-27 RX ADMIN — CEFEPIME 2000 MG: 2 INJECTION, POWDER, FOR SOLUTION INTRAVENOUS at 06:44

## 2025-08-27 RX ADMIN — IPRATROPIUM BROMIDE 0.5 MG: 0.5 SOLUTION RESPIRATORY (INHALATION) at 07:34

## 2025-08-27 RX ADMIN — APIXABAN 5 MG: 5 TABLET, FILM COATED ORAL at 21:29

## 2025-08-27 RX ADMIN — ARFORMOTEROL TARTRATE 15 MCG: 15 SOLUTION RESPIRATORY (INHALATION) at 07:39

## 2025-08-27 RX ADMIN — LOSARTAN POTASSIUM 100 MG: 50 TABLET, FILM COATED ORAL at 09:23

## 2025-08-27 RX ADMIN — VANCOMYCIN HYDROCHLORIDE 1000 MG: 1 INJECTION, POWDER, LYOPHILIZED, FOR SOLUTION INTRAVENOUS at 09:32

## 2025-08-27 RX ADMIN — CYANOCOBALAMIN TAB 500 MCG 1000 MCG: 500 TAB at 09:23

## 2025-08-27 RX ADMIN — FUROSEMIDE 60 MG: 10 INJECTION, SOLUTION INTRAMUSCULAR; INTRAVENOUS at 09:22

## 2025-08-27 RX ADMIN — IPRATROPIUM BROMIDE 0.5 MG: 0.5 SOLUTION RESPIRATORY (INHALATION) at 19:20

## 2025-08-27 RX ADMIN — BUDESONIDE 500 MCG: 0.5 INHALANT RESPIRATORY (INHALATION) at 07:39

## 2025-08-27 RX ADMIN — FUROSEMIDE 60 MG: 10 INJECTION, SOLUTION INTRAMUSCULAR; INTRAVENOUS at 18:52

## 2025-08-27 RX ADMIN — ATORVASTATIN CALCIUM 10 MG: 10 TABLET, FILM COATED ORAL at 09:23

## 2025-08-27 RX ADMIN — ARFORMOTEROL TARTRATE 15 MCG: 15 SOLUTION RESPIRATORY (INHALATION) at 19:25

## 2025-08-27 RX ADMIN — APIXABAN 5 MG: 5 TABLET, FILM COATED ORAL at 09:23

## 2025-08-27 RX ADMIN — HYDROXYCHLOROQUINE SULFATE 400 MG: 200 TABLET ORAL at 18:53

## 2025-08-27 RX ADMIN — CETIRIZINE HYDROCHLORIDE 10 MG: 10 TABLET, FILM COATED ORAL at 09:23

## 2025-08-27 RX ADMIN — BUDESONIDE 500 MCG: 0.5 INHALANT RESPIRATORY (INHALATION) at 19:25

## 2025-08-27 ASSESSMENT — PAIN SCALES - GENERAL
PAINLEVEL_OUTOF10: 0
PAINLEVEL_OUTOF10: 0

## 2025-08-28 VITALS
DIASTOLIC BLOOD PRESSURE: 69 MMHG | HEART RATE: 86 BPM | WEIGHT: 200 LBS | TEMPERATURE: 98.1 F | BODY MASS INDEX: 39.27 KG/M2 | RESPIRATION RATE: 18 BRPM | SYSTOLIC BLOOD PRESSURE: 123 MMHG | OXYGEN SATURATION: 97 % | HEIGHT: 60 IN

## 2025-08-28 LAB
CREAT SERPL-MCNC: 0.91 MG/DL (ref 0.6–1)
VANCOMYCIN SERPL-MCNC: 10.1 UG/ML

## 2025-08-28 PROCEDURE — 2500000003 HC RX 250 WO HCPCS: Performed by: STUDENT IN AN ORGANIZED HEALTH CARE EDUCATION/TRAINING PROGRAM

## 2025-08-28 PROCEDURE — 36415 COLL VENOUS BLD VENIPUNCTURE: CPT

## 2025-08-28 PROCEDURE — 94640 AIRWAY INHALATION TREATMENT: CPT

## 2025-08-28 PROCEDURE — 94761 N-INVAS EAR/PLS OXIMETRY MLT: CPT

## 2025-08-28 PROCEDURE — 6360000002 HC RX W HCPCS: Performed by: STUDENT IN AN ORGANIZED HEALTH CARE EDUCATION/TRAINING PROGRAM

## 2025-08-28 PROCEDURE — 2580000003 HC RX 258: Performed by: STUDENT IN AN ORGANIZED HEALTH CARE EDUCATION/TRAINING PROGRAM

## 2025-08-28 PROCEDURE — 82565 ASSAY OF CREATININE: CPT

## 2025-08-28 PROCEDURE — 6360000002 HC RX W HCPCS: Performed by: INTERNAL MEDICINE

## 2025-08-28 PROCEDURE — 6370000000 HC RX 637 (ALT 250 FOR IP): Performed by: INTERNAL MEDICINE

## 2025-08-28 PROCEDURE — 6370000000 HC RX 637 (ALT 250 FOR IP): Performed by: STUDENT IN AN ORGANIZED HEALTH CARE EDUCATION/TRAINING PROGRAM

## 2025-08-28 PROCEDURE — 80202 ASSAY OF VANCOMYCIN: CPT

## 2025-08-28 RX ADMIN — HYDROXYCHLOROQUINE SULFATE 400 MG: 200 TABLET ORAL at 09:36

## 2025-08-28 RX ADMIN — CYANOCOBALAMIN TAB 500 MCG 1000 MCG: 500 TAB at 09:36

## 2025-08-28 RX ADMIN — SODIUM CHLORIDE 1250 MG: 0.9 INJECTION, SOLUTION INTRAVENOUS at 09:49

## 2025-08-28 RX ADMIN — FUROSEMIDE 60 MG: 10 INJECTION, SOLUTION INTRAMUSCULAR; INTRAVENOUS at 09:36

## 2025-08-28 RX ADMIN — COLCHICINE 0.6 MG: 0.6 TABLET, FILM COATED ORAL at 09:36

## 2025-08-28 RX ADMIN — FOLIC ACID 1000 MCG: 1 TABLET ORAL at 09:36

## 2025-08-28 RX ADMIN — ARFORMOTEROL TARTRATE 15 MCG: 15 SOLUTION RESPIRATORY (INHALATION) at 07:36

## 2025-08-28 RX ADMIN — IPRATROPIUM BROMIDE 0.5 MG: 0.5 SOLUTION RESPIRATORY (INHALATION) at 07:30

## 2025-08-28 RX ADMIN — CEFEPIME 2000 MG: 2 INJECTION, POWDER, FOR SOLUTION INTRAVENOUS at 06:29

## 2025-08-28 RX ADMIN — ATORVASTATIN CALCIUM 10 MG: 10 TABLET, FILM COATED ORAL at 09:36

## 2025-08-28 RX ADMIN — BUDESONIDE 500 MCG: 0.5 INHALANT RESPIRATORY (INHALATION) at 07:36

## 2025-08-28 RX ADMIN — LOSARTAN POTASSIUM 100 MG: 50 TABLET, FILM COATED ORAL at 09:36

## 2025-08-28 RX ADMIN — APIXABAN 5 MG: 5 TABLET, FILM COATED ORAL at 09:36

## 2025-08-28 RX ADMIN — SODIUM CHLORIDE, PRESERVATIVE FREE 10 ML: 5 INJECTION INTRAVENOUS at 09:37

## 2025-08-28 RX ADMIN — CETIRIZINE HYDROCHLORIDE 10 MG: 10 TABLET, FILM COATED ORAL at 09:36

## 2025-08-28 ASSESSMENT — PAIN SCALES - GENERAL: PAINLEVEL_OUTOF10: 0

## (undated) DEVICE — TUBING, SUCTION, 1/4" X 12', STRAIGHT: Brand: MEDLINE

## (undated) DEVICE — DRAPE XR C ARM 41X74IN LF --

## (undated) DEVICE — INTENDED FOR TISSUE SEPARATION, AND OTHER PROCEDURES THAT REQUIRE A SHARP SURGICAL BLADE TO PUNCTURE OR CUT.: Brand: BARD-PARKER ® CARBON RIB-BACK BLADES

## (undated) DEVICE — ALCOHOL RUBBING ISO 16OZ 70%

## (undated) DEVICE — NEEDLE HYPO 18GA L1.5IN PNK S STL HUB POLYPR SHLD REG BVL

## (undated) DEVICE — SUTURE ABSRB BRAID COAT UD CT NO 1 36IN VCRL J959H

## (undated) DEVICE — OPTIFOAM GENTLE SA, POSTOP, 4X10: Brand: MEDLINE

## (undated) DEVICE — PREP KIT PEEL PTCH POVIDONE IOD

## (undated) DEVICE — NDL SPNE QNCKE 18GX3.5IN LF --

## (undated) DEVICE — SPONGE LAP 18X18IN STRL -- 5/PK

## (undated) DEVICE — DECANTER BAG 9": Brand: MEDLINE INDUSTRIES, INC.

## (undated) DEVICE — SYR 20ML LL STRL LF --

## (undated) DEVICE — SUTURE VCRL SZ 1 L36IN ABSRB UD L36MM CT-1 1/2 CIR J947H

## (undated) DEVICE — CODMAN® SURGICAL PATTIES 3/4" X 3/4" (1.91CM X 1.91CM): Brand: CODMAN®

## (undated) DEVICE — SPONGE GZ W4XL4IN COT 12 PLY TYP VII WVN C FLD DSGN

## (undated) DEVICE — SOLUTION IRRIG 1000ML H2O STRL BLT

## (undated) DEVICE — CONTAINER,SPECIMEN,3OZ,OR STRL: Brand: MEDLINE

## (undated) DEVICE — CATHETER IV 14GA L1.25IN TEF STR HUB INTROCAN SFTY

## (undated) DEVICE — STERILE POLYISOPRENE POWDER-FREE SURGICAL GLOVES WITH EMOLLIENT COATING: Brand: PROTEXIS

## (undated) DEVICE — SUTURE GOR TX SZ 5-0 L24IN NONABSORBABLE L13MM TTC-13 3/8 6K04A

## (undated) DEVICE — TOOL 14MH30 LEGEND 14CM 3MM: Brand: MIDAS REX ™

## (undated) DEVICE — 1010 S-DRAPE TOWEL DRAPE 10/BX: Brand: STERI-DRAPE™

## (undated) DEVICE — AEGIS 1" DISK 4MM HOLE, PEEL OPEN: Brand: MEDLINE

## (undated) DEVICE — KIT EVAC 0.13IN RECT TB DIA10FR 400CC PVC 3 SPR Y CONN DRN

## (undated) DEVICE — INFECTION CONTROL KIT SYS

## (undated) DEVICE — PREP SKN CHLRAPRP APL 26ML STR --

## (undated) DEVICE — Z DUP USE 2701075 SYSTEM SKIN CLSR 42CM DERMBND PRINEO

## (undated) DEVICE — TIP CLEANER: Brand: VALLEYLAB

## (undated) DEVICE — STERILE POLYISOPRENE POWDER-FREE SURGICAL GLOVES: Brand: PROTEXIS

## (undated) DEVICE — 450 ML BOTTLE OF 0.05% CHLORHEXIDINE GLUCONATE IN 99.95% STERILE WATER FOR IRRIGATION, USP AND APPLICATOR.: Brand: IRRISEPT ANTIMICROBIAL WOUND LAVAGE

## (undated) DEVICE — SUTURE STRATAFIX SPRL SZ 1 L14IN ABSRB VLT L48CM CTX 1/2 SXPD2B405

## (undated) DEVICE — BIPOLAR FORCEPS CORD: Brand: VALLEYLAB

## (undated) DEVICE — COVER LT HNDL PLAS RIG 1 PER PK

## (undated) DEVICE — COVER,MAYO STAND,XL,STERILE: Brand: MEDLINE

## (undated) DEVICE — SUTURE MCRYL SZ 3-0 L27IN ABSRB UD L24MM PS-1 3/8 CIR PRIM Y936H

## (undated) DEVICE — COVER,MAYO STAND,STERILE: Brand: MEDLINE

## (undated) DEVICE — TOTAL TRAY, 16FR 10ML SIL FOLEY, URN: Brand: MEDLINE

## (undated) DEVICE — MAGNETIC INSTR DRAPE 20X16: Brand: MEDLINE INDUSTRIES, INC.

## (undated) DEVICE — THE MILL DISPOSABLE - MEDIUM

## (undated) DEVICE — COVER,TABLE,60X90,STERILE: Brand: MEDLINE

## (undated) DEVICE — 3M™ TEGADERM™ TRANSPARENT FILM DRESSING FRAME STYLE, 1624W, 2-3/8 IN X 2-3/4 IN (6 CM X 7 CM), 100/CT 4CT/CASE: Brand: 3M™ TEGADERM™

## (undated) DEVICE — GOWN,SIRUS,NONRNF,SETINSLV,XL,20/CS: Brand: MEDLINE

## (undated) DEVICE — JACKSON TABLE POSITIONER KIT: Brand: MEDLINE INDUSTRIES, INC.

## (undated) DEVICE — BONE WAX WHITE: Brand: BONE WAX WHITE

## (undated) DEVICE — CANISTER, RIGID, 3000CC: Brand: MEDLINE INDUSTRIES, INC.

## (undated) DEVICE — SUTURE VCRL 0 L27IN ABSRB UD CT L40MM 1/2 CIR TAPERPOINT J280H

## (undated) DEVICE — FLOSEAL HEMOSTATIC MATRIX, 10ML: Brand: FLOSEAL HEMOSTATIC MATRIX

## (undated) DEVICE — DRAPE,REIN 53X77,STERILE: Brand: MEDLINE

## (undated) DEVICE — ELECTRODE BLDE L4IN NONINSULATED EDGE

## (undated) DEVICE — STRAP,POSITIONING,KNEE/BODY,FOAM,4X60": Brand: MEDLINE

## (undated) DEVICE — 3M™ TEGADERM™ TRANSPARENT FILM DRESSING FRAME STYLE, 1626, 4 IN X 4-3/4 IN (10 CM X 12 CM), 50/CT 4CT/CASE: Brand: 3M™ TEGADERM™

## (undated) DEVICE — SUTURE VCRL SZ 2-0 L36IN ABSRB UD L36MM CT-1 1/2 CIR J945H

## (undated) DEVICE — ACCY PA100-A LEGEND LUB/DIFFUSER 4 PACK: Brand: MIDAS REX®

## (undated) DEVICE — DRAPE,UTILITY,TAPE,15X26,STERILE: Brand: MEDLINE

## (undated) DEVICE — SYR 10ML LUER LOK 1/5ML GRAD --

## (undated) DEVICE — LAMINECTOMY RICHMOND-LF: Brand: MEDLINE INDUSTRIES, INC.